# Patient Record
Sex: MALE | Race: WHITE | Employment: UNEMPLOYED | URBAN - METROPOLITAN AREA
[De-identification: names, ages, dates, MRNs, and addresses within clinical notes are randomized per-mention and may not be internally consistent; named-entity substitution may affect disease eponyms.]

---

## 2018-01-01 ENCOUNTER — APPOINTMENT (OUTPATIENT)
Dept: GENERAL RADIOLOGY | Age: 83
DRG: 698 | End: 2018-01-01
Attending: EMERGENCY MEDICINE
Payer: MEDICARE

## 2018-01-01 ENCOUNTER — APPOINTMENT (OUTPATIENT)
Dept: CT IMAGING | Age: 83
End: 2018-01-01
Attending: NURSE PRACTITIONER
Payer: MEDICARE

## 2018-01-01 ENCOUNTER — APPOINTMENT (OUTPATIENT)
Dept: GENERAL RADIOLOGY | Age: 83
End: 2018-01-01
Attending: NURSE PRACTITIONER
Payer: MEDICARE

## 2018-01-01 ENCOUNTER — HOME CARE VISIT (OUTPATIENT)
Dept: HOSPICE | Facility: HOSPICE | Age: 83
End: 2018-01-01
Payer: MEDICARE

## 2018-01-01 ENCOUNTER — HOSPITAL ENCOUNTER (INPATIENT)
Age: 83
LOS: 7 days | Discharge: NURSING, REHAB OR LONG-TERM CARE | DRG: 698 | End: 2018-10-25
Attending: EMERGENCY MEDICINE | Admitting: INTERNAL MEDICINE
Payer: MEDICARE

## 2018-01-01 ENCOUNTER — APPOINTMENT (OUTPATIENT)
Dept: GENERAL RADIOLOGY | Age: 83
DRG: 698 | End: 2018-01-01
Attending: INTERNAL MEDICINE
Payer: MEDICARE

## 2018-01-01 ENCOUNTER — HOSPITAL ENCOUNTER (INPATIENT)
Age: 83
LOS: 1 days | DRG: 951 | End: 2018-12-03
Attending: INTERNAL MEDICINE | Admitting: INTERNAL MEDICINE
Payer: OTHER MISCELLANEOUS

## 2018-01-01 ENCOUNTER — HOSPITAL ENCOUNTER (EMERGENCY)
Age: 83
Discharge: HOME | End: 2018-10-10
Attending: EMERGENCY MEDICINE
Payer: MEDICARE

## 2018-01-01 ENCOUNTER — APPOINTMENT (OUTPATIENT)
Dept: GENERAL RADIOLOGY | Age: 83
DRG: 698 | End: 2018-01-01
Attending: GENERAL ACUTE CARE HOSPITAL
Payer: MEDICARE

## 2018-01-01 ENCOUNTER — HOME CARE VISIT (OUTPATIENT)
Dept: SCHEDULING | Facility: HOME HEALTH | Age: 83
End: 2018-01-01
Payer: MEDICARE

## 2018-01-01 ENCOUNTER — APPOINTMENT (OUTPATIENT)
Dept: ULTRASOUND IMAGING | Age: 83
DRG: 698 | End: 2018-01-01
Attending: INTERNAL MEDICINE
Payer: MEDICARE

## 2018-01-01 ENCOUNTER — HOSPITAL ENCOUNTER (INPATIENT)
Age: 83
LOS: 8 days | Discharge: HOSPICE | DRG: 698 | End: 2018-12-02
Attending: EMERGENCY MEDICINE | Admitting: INTERNAL MEDICINE
Payer: MEDICARE

## 2018-01-01 ENCOUNTER — HOSPICE ADMISSION (OUTPATIENT)
Dept: HOSPICE | Facility: HOSPICE | Age: 83
End: 2018-01-01
Payer: MEDICARE

## 2018-01-01 ENCOUNTER — APPOINTMENT (OUTPATIENT)
Dept: CT IMAGING | Age: 83
DRG: 698 | End: 2018-01-01
Attending: EMERGENCY MEDICINE
Payer: MEDICARE

## 2018-01-01 ENCOUNTER — APPOINTMENT (OUTPATIENT)
Dept: GENERAL RADIOLOGY | Age: 83
DRG: 698 | End: 2018-01-01
Attending: FAMILY MEDICINE
Payer: MEDICARE

## 2018-01-01 ENCOUNTER — APPOINTMENT (OUTPATIENT)
Dept: CT IMAGING | Age: 83
DRG: 698 | End: 2018-01-01
Attending: INTERNAL MEDICINE
Payer: MEDICARE

## 2018-01-01 VITALS
HEART RATE: 88 BPM | RESPIRATION RATE: 18 BRPM | TEMPERATURE: 97.4 F | WEIGHT: 143.08 LBS | SYSTOLIC BLOOD PRESSURE: 137 MMHG | BODY MASS INDEX: 19.38 KG/M2 | HEIGHT: 72 IN | OXYGEN SATURATION: 87 % | DIASTOLIC BLOOD PRESSURE: 85 MMHG

## 2018-01-01 VITALS
TEMPERATURE: 97.3 F | RESPIRATION RATE: 18 BRPM | HEART RATE: 91 BPM | DIASTOLIC BLOOD PRESSURE: 82 MMHG | OXYGEN SATURATION: 100 % | SYSTOLIC BLOOD PRESSURE: 127 MMHG

## 2018-01-01 VITALS
DIASTOLIC BLOOD PRESSURE: 71 MMHG | WEIGHT: 143.96 LBS | SYSTOLIC BLOOD PRESSURE: 130 MMHG | HEART RATE: 73 BPM | OXYGEN SATURATION: 96 % | HEIGHT: 72 IN | RESPIRATION RATE: 16 BRPM | BODY MASS INDEX: 19.5 KG/M2 | TEMPERATURE: 97.4 F

## 2018-01-01 DIAGNOSIS — G93.41 ACUTE METABOLIC ENCEPHALOPATHY: ICD-10-CM

## 2018-01-01 DIAGNOSIS — T68.XXXA HYPOTHERMIA, INITIAL ENCOUNTER: ICD-10-CM

## 2018-01-01 DIAGNOSIS — R53.81 PHYSICAL DEBILITY: ICD-10-CM

## 2018-01-01 DIAGNOSIS — R41.0 DELIRIUM: ICD-10-CM

## 2018-01-01 DIAGNOSIS — Z71.89 COUNSELING REGARDING ADVANCED CARE PLANNING AND GOALS OF CARE: ICD-10-CM

## 2018-01-01 DIAGNOSIS — S09.90XA CLOSED HEAD INJURY, INITIAL ENCOUNTER: ICD-10-CM

## 2018-01-01 DIAGNOSIS — F03.91 DEMENTIA WITH BEHAVIORAL DISTURBANCE, UNSPECIFIED DEMENTIA TYPE: ICD-10-CM

## 2018-01-01 DIAGNOSIS — R65.20 SEVERE SEPSIS (HCC): Primary | ICD-10-CM

## 2018-01-01 DIAGNOSIS — J18.9 PNEUMONIA OF RIGHT LUNG DUE TO INFECTIOUS ORGANISM, UNSPECIFIED PART OF LUNG: ICD-10-CM

## 2018-01-01 DIAGNOSIS — S01.01XA LACERATION OF SCALP, INITIAL ENCOUNTER: ICD-10-CM

## 2018-01-01 DIAGNOSIS — T83.511A URINARY TRACT INFECTION ASSOCIATED WITH INDWELLING URETHRAL CATHETER, INITIAL ENCOUNTER (HCC): ICD-10-CM

## 2018-01-01 DIAGNOSIS — R11.2 NAUSEA AND VOMITING, INTRACTABILITY OF VOMITING NOT SPECIFIED, UNSPECIFIED VOMITING TYPE: Primary | ICD-10-CM
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
## 2018-01-01 DIAGNOSIS — S61.411A SKIN TEAR OF RIGHT HAND WITHOUT COMPLICATION, INITIAL ENCOUNTER: ICD-10-CM

## 2018-01-01 DIAGNOSIS — E87.6 ACUTE HYPOKALEMIA: ICD-10-CM

## 2018-01-01 DIAGNOSIS — N39.0 URINARY TRACT INFECTION WITHOUT HEMATURIA, SITE UNSPECIFIED: ICD-10-CM

## 2018-01-01 DIAGNOSIS — A41.9 SEVERE SEPSIS (HCC): Primary | ICD-10-CM

## 2018-01-01 DIAGNOSIS — N39.0 URINARY TRACT INFECTION ASSOCIATED WITH INDWELLING URETHRAL CATHETER, INITIAL ENCOUNTER (HCC): ICD-10-CM

## 2018-01-01 DIAGNOSIS — N17.9 AKI (ACUTE KIDNEY INJURY) (HCC): ICD-10-CM

## 2018-01-01 DIAGNOSIS — I95.9 HYPOTENSION, UNSPECIFIED HYPOTENSION TYPE: ICD-10-CM

## 2018-01-01 DIAGNOSIS — E87.0 ACUTE HYPERNATREMIA: ICD-10-CM

## 2018-01-01 DIAGNOSIS — R13.11 ORAL PHASE DYSPHAGIA: ICD-10-CM

## 2018-01-01 LAB
ALBUMIN SERPL-MCNC: 1.7 G/DL (ref 3.5–5)
ALBUMIN SERPL-MCNC: 2.6 G/DL (ref 3.5–5)
ALBUMIN/GLOB SERPL: 0.3 {RATIO} (ref 1.1–2.2)
ALBUMIN/GLOB SERPL: 0.6 {RATIO} (ref 1.1–2.2)
ALP SERPL-CCNC: 88 U/L (ref 45–117)
ALP SERPL-CCNC: 93 U/L (ref 45–117)
ALT SERPL-CCNC: 26 U/L (ref 12–78)
ALT SERPL-CCNC: 28 U/L (ref 12–78)
ANION GAP SERPL CALC-SCNC: 10 MMOL/L (ref 5–15)
ANION GAP SERPL CALC-SCNC: 10 MMOL/L (ref 5–15)
ANION GAP SERPL CALC-SCNC: 11 MMOL/L (ref 5–15)
ANION GAP SERPL CALC-SCNC: 13 MMOL/L (ref 5–15)
ANION GAP SERPL CALC-SCNC: 5 MMOL/L (ref 5–15)
ANION GAP SERPL CALC-SCNC: 6 MMOL/L (ref 5–15)
ANION GAP SERPL CALC-SCNC: 7 MMOL/L (ref 5–15)
ANION GAP SERPL CALC-SCNC: 7 MMOL/L (ref 5–15)
ANION GAP SERPL CALC-SCNC: 8 MMOL/L (ref 5–15)
ANION GAP SERPL CALC-SCNC: 8 MMOL/L (ref 5–15)
ANION GAP SERPL CALC-SCNC: 9 MMOL/L (ref 5–15)
ANION GAP SERPL CALC-SCNC: 9 MMOL/L (ref 5–15)
APPEARANCE UR: ABNORMAL
AST SERPL-CCNC: 24 U/L (ref 15–37)
AST SERPL-CCNC: 37 U/L (ref 15–37)
ATRIAL RATE: 101 BPM
BACTERIA SPEC CULT: ABNORMAL
BACTERIA SPEC CULT: NORMAL
BACTERIA URNS QL MICRO: ABNORMAL /HPF
BASOPHILS # BLD: 0 K/UL (ref 0–0.1)
BASOPHILS # BLD: 0.1 K/UL (ref 0–0.1)
BASOPHILS NFR BLD: 0 % (ref 0–1)
BASOPHILS NFR BLD: 1 % (ref 0–1)
BILIRUB SERPL-MCNC: 0.4 MG/DL (ref 0.2–1)
BILIRUB SERPL-MCNC: 0.7 MG/DL (ref 0.2–1)
BILIRUB UR QL CFM: NEGATIVE
BILIRUB UR QL CFM: NEGATIVE
BILIRUB UR QL: NEGATIVE
BUN SERPL-MCNC: 11 MG/DL (ref 6–20)
BUN SERPL-MCNC: 21 MG/DL (ref 6–20)
BUN SERPL-MCNC: 24 MG/DL (ref 6–20)
BUN SERPL-MCNC: 25 MG/DL (ref 6–20)
BUN SERPL-MCNC: 28 MG/DL (ref 6–20)
BUN SERPL-MCNC: 30 MG/DL (ref 6–20)
BUN SERPL-MCNC: 31 MG/DL (ref 6–20)
BUN SERPL-MCNC: 34 MG/DL (ref 6–20)
BUN SERPL-MCNC: 7 MG/DL (ref 6–20)
BUN SERPL-MCNC: 9 MG/DL (ref 6–20)
BUN/CREAT SERPL: 10 (ref 12–20)
BUN/CREAT SERPL: 11 (ref 12–20)
BUN/CREAT SERPL: 11 (ref 12–20)
BUN/CREAT SERPL: 16 (ref 12–20)
BUN/CREAT SERPL: 20 (ref 12–20)
BUN/CREAT SERPL: 22 (ref 12–20)
BUN/CREAT SERPL: 24 (ref 12–20)
BUN/CREAT SERPL: 24 (ref 12–20)
BUN/CREAT SERPL: 26 (ref 12–20)
BUN/CREAT SERPL: 9 (ref 12–20)
CALCIUM SERPL-MCNC: 7.3 MG/DL (ref 8.5–10.1)
CALCIUM SERPL-MCNC: 7.6 MG/DL (ref 8.5–10.1)
CALCIUM SERPL-MCNC: 7.7 MG/DL (ref 8.5–10.1)
CALCIUM SERPL-MCNC: 7.7 MG/DL (ref 8.5–10.1)
CALCIUM SERPL-MCNC: 7.8 MG/DL (ref 8.5–10.1)
CALCIUM SERPL-MCNC: 8.1 MG/DL (ref 8.5–10.1)
CALCIUM SERPL-MCNC: 8.2 MG/DL (ref 8.5–10.1)
CALCIUM SERPL-MCNC: 8.3 MG/DL (ref 8.5–10.1)
CALCIUM SERPL-MCNC: 8.4 MG/DL (ref 8.5–10.1)
CALCIUM SERPL-MCNC: 8.6 MG/DL (ref 8.5–10.1)
CALCIUM SERPL-MCNC: 8.7 MG/DL (ref 8.5–10.1)
CALCIUM SERPL-MCNC: 8.7 MG/DL (ref 8.5–10.1)
CALCULATED P AXIS, ECG09: 77 DEGREES
CALCULATED R AXIS, ECG10: 73 DEGREES
CALCULATED T AXIS, ECG11: 68 DEGREES
CC UR VC: ABNORMAL
CC UR VC: ABNORMAL
CHLORIDE SERPL-SCNC: 100 MMOL/L (ref 97–108)
CHLORIDE SERPL-SCNC: 103 MMOL/L (ref 97–108)
CHLORIDE SERPL-SCNC: 104 MMOL/L (ref 97–108)
CHLORIDE SERPL-SCNC: 105 MMOL/L (ref 97–108)
CHLORIDE SERPL-SCNC: 106 MMOL/L (ref 97–108)
CHLORIDE SERPL-SCNC: 115 MMOL/L (ref 97–108)
CHLORIDE SERPL-SCNC: 122 MMOL/L (ref 97–108)
CHLORIDE SERPL-SCNC: 122 MMOL/L (ref 97–108)
CHLORIDE SERPL-SCNC: 123 MMOL/L (ref 97–108)
CHLORIDE SERPL-SCNC: 124 MMOL/L (ref 97–108)
CHLORIDE SERPL-SCNC: 125 MMOL/L (ref 97–108)
CHLORIDE SERPL-SCNC: 126 MMOL/L (ref 97–108)
CO2 SERPL-SCNC: 17 MMOL/L (ref 21–32)
CO2 SERPL-SCNC: 18 MMOL/L (ref 21–32)
CO2 SERPL-SCNC: 18 MMOL/L (ref 21–32)
CO2 SERPL-SCNC: 19 MMOL/L (ref 21–32)
CO2 SERPL-SCNC: 19 MMOL/L (ref 21–32)
CO2 SERPL-SCNC: 22 MMOL/L (ref 21–32)
CO2 SERPL-SCNC: 22 MMOL/L (ref 21–32)
CO2 SERPL-SCNC: 23 MMOL/L (ref 21–32)
CO2 SERPL-SCNC: 24 MMOL/L (ref 21–32)
CO2 SERPL-SCNC: 24 MMOL/L (ref 21–32)
CO2 SERPL-SCNC: 29 MMOL/L (ref 21–32)
CO2 SERPL-SCNC: 29 MMOL/L (ref 21–32)
COLOR UR: ABNORMAL
CREAT SERPL-MCNC: 0.65 MG/DL (ref 0.55–1.02)
CREAT SERPL-MCNC: 0.65 MG/DL (ref 0.7–1.3)
CREAT SERPL-MCNC: 0.7 MG/DL (ref 0.55–1.02)
CREAT SERPL-MCNC: 0.7 MG/DL (ref 0.55–1.02)
CREAT SERPL-MCNC: 0.96 MG/DL (ref 0.7–1.3)
CREAT SERPL-MCNC: 1.04 MG/DL (ref 0.7–1.3)
CREAT SERPL-MCNC: 1.18 MG/DL (ref 0.7–1.3)
CREAT SERPL-MCNC: 1.21 MG/DL (ref 0.7–1.3)
CREAT SERPL-MCNC: 1.23 MG/DL (ref 0.7–1.3)
CREAT SERPL-MCNC: 1.24 MG/DL (ref 0.7–1.3)
CREAT SERPL-MCNC: 1.26 MG/DL (ref 0.7–1.3)
CREAT SERPL-MCNC: 1.42 MG/DL (ref 0.7–1.3)
CREAT UR-MCNC: 132 MG/DL
DATE LAST DOSE: ABNORMAL
DATE LAST DOSE: ABNORMAL
DIAGNOSIS, 93000: NORMAL
DIFFERENTIAL METHOD BLD: ABNORMAL
EOSINOPHIL # BLD: 0 K/UL (ref 0–0.4)
EOSINOPHIL # BLD: 0 K/UL (ref 0–0.4)
EOSINOPHIL # BLD: 0.1 K/UL (ref 0–0.4)
EOSINOPHIL NFR BLD: 0 % (ref 0–7)
EOSINOPHIL NFR BLD: 0 % (ref 0–7)
EOSINOPHIL NFR BLD: 1 % (ref 0–7)
EPITH CASTS URNS QL MICRO: ABNORMAL /LPF
ERYTHROCYTE [DISTWIDTH] IN BLOOD BY AUTOMATED COUNT: 13.3 % (ref 11.5–14.5)
ERYTHROCYTE [DISTWIDTH] IN BLOOD BY AUTOMATED COUNT: 13.5 % (ref 11.5–14.5)
ERYTHROCYTE [DISTWIDTH] IN BLOOD BY AUTOMATED COUNT: 13.6 % (ref 11.5–14.5)
ERYTHROCYTE [DISTWIDTH] IN BLOOD BY AUTOMATED COUNT: 13.7 % (ref 11.5–14.5)
ERYTHROCYTE [DISTWIDTH] IN BLOOD BY AUTOMATED COUNT: 14.2 % (ref 11.5–14.5)
ERYTHROCYTE [DISTWIDTH] IN BLOOD BY AUTOMATED COUNT: 16.6 % (ref 11.5–14.5)
ERYTHROCYTE [DISTWIDTH] IN BLOOD BY AUTOMATED COUNT: 17.5 % (ref 11.5–14.5)
ERYTHROCYTE [DISTWIDTH] IN BLOOD BY AUTOMATED COUNT: 17.6 % (ref 11.5–14.5)
ERYTHROCYTE [DISTWIDTH] IN BLOOD BY AUTOMATED COUNT: 18 % (ref 11.5–14.5)
ERYTHROCYTE [DISTWIDTH] IN BLOOD BY AUTOMATED COUNT: 18.2 % (ref 11.5–14.5)
GLOBULIN SER CALC-MCNC: 4.7 G/DL (ref 2–4)
GLOBULIN SER CALC-MCNC: 4.9 G/DL (ref 2–4)
GLUCOSE BLD STRIP.AUTO-MCNC: 124 MG/DL (ref 65–100)
GLUCOSE BLD STRIP.AUTO-MCNC: 143 MG/DL (ref 65–100)
GLUCOSE BLD STRIP.AUTO-MCNC: 71 MG/DL (ref 65–100)
GLUCOSE BLD STRIP.AUTO-MCNC: 82 MG/DL (ref 65–100)
GLUCOSE BLD STRIP.AUTO-MCNC: 91 MG/DL (ref 65–100)
GLUCOSE BLD STRIP.AUTO-MCNC: 93 MG/DL (ref 65–100)
GLUCOSE BLD STRIP.AUTO-MCNC: 96 MG/DL (ref 65–100)
GLUCOSE SERPL-MCNC: 110 MG/DL (ref 65–100)
GLUCOSE SERPL-MCNC: 136 MG/DL (ref 65–100)
GLUCOSE SERPL-MCNC: 63 MG/DL (ref 65–100)
GLUCOSE SERPL-MCNC: 64 MG/DL (ref 65–100)
GLUCOSE SERPL-MCNC: 65 MG/DL (ref 65–100)
GLUCOSE SERPL-MCNC: 70 MG/DL (ref 65–100)
GLUCOSE SERPL-MCNC: 72 MG/DL (ref 65–100)
GLUCOSE SERPL-MCNC: 85 MG/DL (ref 65–100)
GLUCOSE SERPL-MCNC: 93 MG/DL (ref 65–100)
GLUCOSE SERPL-MCNC: 97 MG/DL (ref 65–100)
GLUCOSE SERPL-MCNC: 98 MG/DL (ref 65–100)
GLUCOSE SERPL-MCNC: 98 MG/DL (ref 65–100)
GLUCOSE UR STRIP.AUTO-MCNC: NEGATIVE MG/DL
HCT VFR BLD AUTO: 26.3 % (ref 36.6–50.3)
HCT VFR BLD AUTO: 27.2 % (ref 36.6–50.3)
HCT VFR BLD AUTO: 30.2 % (ref 36.6–50.3)
HCT VFR BLD AUTO: 31.6 % (ref 36.6–50.3)
HCT VFR BLD AUTO: 32.4 % (ref 36.6–50.3)
HCT VFR BLD AUTO: 33.5 % (ref 36.6–50.3)
HCT VFR BLD AUTO: 34.1 % (ref 35–47)
HCT VFR BLD AUTO: 35.5 % (ref 35–47)
HCT VFR BLD AUTO: 36.1 % (ref 36.6–50.3)
HCT VFR BLD AUTO: 36.6 % (ref 35–47)
HGB BLD-MCNC: 10 G/DL (ref 12.1–17)
HGB BLD-MCNC: 10.4 G/DL (ref 12.1–17)
HGB BLD-MCNC: 11.4 G/DL (ref 12.1–17)
HGB BLD-MCNC: 11.6 G/DL (ref 11.5–16)
HGB BLD-MCNC: 11.6 G/DL (ref 12.1–17)
HGB BLD-MCNC: 12.4 G/DL (ref 11.5–16)
HGB BLD-MCNC: 12.4 G/DL (ref 12.1–17)
HGB BLD-MCNC: 12.8 G/DL (ref 11.5–16)
HGB BLD-MCNC: 8.9 G/DL (ref 12.1–17)
HGB BLD-MCNC: 9.3 G/DL (ref 12.1–17)
HGB UR QL STRIP: ABNORMAL
HYALINE CASTS URNS QL MICRO: ABNORMAL /LPF (ref 0–5)
HYALINE CASTS URNS QL MICRO: ABNORMAL /LPF (ref 0–5)
IMM GRANULOCYTES # BLD: 0 K/UL (ref 0–0.04)
IMM GRANULOCYTES # BLD: 0 K/UL (ref 0–0.04)
IMM GRANULOCYTES # BLD: 0.1 K/UL (ref 0–0.04)
IMM GRANULOCYTES NFR BLD AUTO: 0 % (ref 0–0.5)
IMM GRANULOCYTES NFR BLD AUTO: 0 % (ref 0–0.5)
IMM GRANULOCYTES NFR BLD AUTO: 1 % (ref 0–0.5)
KETONES UR QL STRIP.AUTO: ABNORMAL MG/DL
KETONES UR QL STRIP.AUTO: NEGATIVE MG/DL
KETONES UR QL STRIP.AUTO: NEGATIVE MG/DL
LACTATE SERPL-SCNC: 1.2 MMOL/L (ref 0.4–2)
LACTATE SERPL-SCNC: 1.3 MMOL/L (ref 0.4–2)
LEUKOCYTE ESTERASE UR QL STRIP.AUTO: ABNORMAL
LYMPHOCYTES # BLD: 0.1 K/UL (ref 0.8–3.5)
LYMPHOCYTES # BLD: 0.4 K/UL (ref 0.8–3.5)
LYMPHOCYTES # BLD: 0.5 K/UL (ref 0.8–3.5)
LYMPHOCYTES # BLD: 0.7 K/UL (ref 0.8–3.5)
LYMPHOCYTES # BLD: 0.7 K/UL (ref 0.8–3.5)
LYMPHOCYTES # BLD: 0.8 K/UL (ref 0.8–3.5)
LYMPHOCYTES NFR BLD: 1 % (ref 12–49)
LYMPHOCYTES NFR BLD: 4 % (ref 12–49)
LYMPHOCYTES NFR BLD: 5 % (ref 12–49)
LYMPHOCYTES NFR BLD: 6 % (ref 12–49)
LYMPHOCYTES NFR BLD: 8 % (ref 12–49)
LYMPHOCYTES NFR BLD: 9 % (ref 12–49)
MAGNESIUM SERPL-MCNC: 1.6 MG/DL (ref 1.6–2.4)
MAGNESIUM SERPL-MCNC: 1.6 MG/DL (ref 1.6–2.4)
MAGNESIUM SERPL-MCNC: 1.7 MG/DL (ref 1.6–2.4)
MAGNESIUM SERPL-MCNC: 1.7 MG/DL (ref 1.6–2.4)
MAGNESIUM SERPL-MCNC: 1.9 MG/DL (ref 1.6–2.4)
MAGNESIUM SERPL-MCNC: 2 MG/DL (ref 1.6–2.4)
MCH RBC QN AUTO: 30.8 PG (ref 26–34)
MCH RBC QN AUTO: 31 PG (ref 26–34)
MCH RBC QN AUTO: 31.1 PG (ref 26–34)
MCH RBC QN AUTO: 31.1 PG (ref 26–34)
MCH RBC QN AUTO: 31.2 PG (ref 26–34)
MCH RBC QN AUTO: 31.4 PG (ref 26–34)
MCH RBC QN AUTO: 31.4 PG (ref 26–34)
MCH RBC QN AUTO: 31.7 PG (ref 26–34)
MCH RBC QN AUTO: 31.7 PG (ref 26–34)
MCH RBC QN AUTO: 31.8 PG (ref 26–34)
MCHC RBC AUTO-ENTMCNC: 32.9 G/DL (ref 30–36.5)
MCHC RBC AUTO-ENTMCNC: 33.1 G/DL (ref 30–36.5)
MCHC RBC AUTO-ENTMCNC: 33.8 G/DL (ref 30–36.5)
MCHC RBC AUTO-ENTMCNC: 34 G/DL (ref 30–36.5)
MCHC RBC AUTO-ENTMCNC: 34.2 G/DL (ref 30–36.5)
MCHC RBC AUTO-ENTMCNC: 34.3 G/DL (ref 30–36.5)
MCHC RBC AUTO-ENTMCNC: 34.6 G/DL (ref 30–36.5)
MCHC RBC AUTO-ENTMCNC: 34.9 G/DL (ref 30–36.5)
MCHC RBC AUTO-ENTMCNC: 35 G/DL (ref 30–36.5)
MCHC RBC AUTO-ENTMCNC: 35.2 G/DL (ref 30–36.5)
MCV RBC AUTO: 89.8 FL (ref 80–99)
MCV RBC AUTO: 89.9 FL (ref 80–99)
MCV RBC AUTO: 90 FL (ref 80–99)
MCV RBC AUTO: 90.3 FL (ref 80–99)
MCV RBC AUTO: 91 FL (ref 80–99)
MCV RBC AUTO: 91.3 FL (ref 80–99)
MCV RBC AUTO: 92 FL (ref 80–99)
MCV RBC AUTO: 92.2 FL (ref 80–99)
MCV RBC AUTO: 93.5 FL (ref 80–99)
MCV RBC AUTO: 95.9 FL (ref 80–99)
MONOCYTES # BLD: 0.1 K/UL (ref 0–1)
MONOCYTES # BLD: 0.2 K/UL (ref 0–1)
MONOCYTES # BLD: 0.5 K/UL (ref 0–1)
MONOCYTES # BLD: 0.6 K/UL (ref 0–1)
MONOCYTES # BLD: 0.7 K/UL (ref 0–1)
MONOCYTES # BLD: 0.8 K/UL (ref 0–1)
MONOCYTES NFR BLD: 1 % (ref 5–13)
MONOCYTES NFR BLD: 2 % (ref 5–13)
MONOCYTES NFR BLD: 5 % (ref 5–13)
MONOCYTES NFR BLD: 7 % (ref 5–13)
MONOCYTES NFR BLD: 8 % (ref 5–13)
MONOCYTES NFR BLD: 8 % (ref 5–13)
NEUTS BAND NFR BLD MANUAL: 8 %
NEUTS SEG # BLD: 7.2 K/UL (ref 1.8–8)
NEUTS SEG # BLD: 7.6 K/UL (ref 1.8–8)
NEUTS SEG # BLD: 8.7 K/UL (ref 1.8–8)
NEUTS SEG # BLD: 8.8 K/UL (ref 1.8–8)
NEUTS SEG # BLD: 9 K/UL (ref 1.8–8)
NEUTS SEG # BLD: 9.2 K/UL (ref 1.8–8)
NEUTS SEG NFR BLD: 82 % (ref 32–75)
NEUTS SEG NFR BLD: 82 % (ref 32–75)
NEUTS SEG NFR BLD: 84 % (ref 32–75)
NEUTS SEG NFR BLD: 89 % (ref 32–75)
NEUTS SEG NFR BLD: 90 % (ref 32–75)
NEUTS SEG NFR BLD: 94 % (ref 32–75)
NITRITE UR QL STRIP.AUTO: NEGATIVE
NITRITE UR QL STRIP.AUTO: POSITIVE
NITRITE UR QL STRIP.AUTO: POSITIVE
NRBC # BLD: 0 K/UL (ref 0–0.01)
NRBC # BLD: 0.02 K/UL (ref 0–0.01)
NRBC # BLD: 0.03 K/UL (ref 0–0.01)
NRBC BLD-RTO: 0 PER 100 WBC
NRBC BLD-RTO: 0.2 PER 100 WBC
NRBC BLD-RTO: 0.2 PER 100 WBC
P-R INTERVAL, ECG05: 128 MS
PH UR STRIP: 6 [PH] (ref 5–8)
PH UR STRIP: 6 [PH] (ref 5–8)
PH UR STRIP: 7.5 [PH] (ref 5–8)
PHOSPHATE SERPL-MCNC: 1.9 MG/DL (ref 2.6–4.7)
PHOSPHATE SERPL-MCNC: 2 MG/DL (ref 2.6–4.7)
PHOSPHATE SERPL-MCNC: 2.1 MG/DL (ref 2.6–4.7)
PHOSPHATE SERPL-MCNC: 3.2 MG/DL (ref 2.6–4.7)
PLATELET # BLD AUTO: 116 K/UL (ref 150–400)
PLATELET # BLD AUTO: 121 K/UL (ref 150–400)
PLATELET # BLD AUTO: 226 K/UL (ref 150–400)
PLATELET # BLD AUTO: 227 K/UL (ref 150–400)
PLATELET # BLD AUTO: 231 K/UL (ref 150–400)
PLATELET # BLD AUTO: 257 K/UL (ref 150–400)
PLATELET # BLD AUTO: 282 K/UL (ref 150–400)
PLATELET # BLD AUTO: 64 K/UL (ref 150–400)
PLATELET # BLD AUTO: 81 K/UL (ref 150–400)
PLATELET # BLD AUTO: 98 K/UL (ref 150–400)
PMV BLD AUTO: 10.2 FL (ref 8.9–12.9)
PMV BLD AUTO: 10.2 FL (ref 8.9–12.9)
PMV BLD AUTO: 10.5 FL (ref 8.9–12.9)
PMV BLD AUTO: 10.8 FL (ref 8.9–12.9)
PMV BLD AUTO: 10.9 FL (ref 8.9–12.9)
PMV BLD AUTO: 11.2 FL (ref 8.9–12.9)
PMV BLD AUTO: 11.3 FL (ref 8.9–12.9)
PMV BLD AUTO: 12 FL (ref 8.9–12.9)
PMV BLD AUTO: 9.9 FL (ref 8.9–12.9)
PMV BLD AUTO: 9.9 FL (ref 8.9–12.9)
POTASSIUM SERPL-SCNC: 2.6 MMOL/L (ref 3.5–5.1)
POTASSIUM SERPL-SCNC: 3 MMOL/L (ref 3.5–5.1)
POTASSIUM SERPL-SCNC: 3.1 MMOL/L (ref 3.5–5.1)
POTASSIUM SERPL-SCNC: 3.1 MMOL/L (ref 3.5–5.1)
POTASSIUM SERPL-SCNC: 3.2 MMOL/L (ref 3.5–5.1)
POTASSIUM SERPL-SCNC: 3.4 MMOL/L (ref 3.5–5.1)
POTASSIUM SERPL-SCNC: 3.5 MMOL/L (ref 3.5–5.1)
POTASSIUM SERPL-SCNC: 3.6 MMOL/L (ref 3.5–5.1)
POTASSIUM SERPL-SCNC: 3.7 MMOL/L (ref 3.5–5.1)
POTASSIUM SERPL-SCNC: 3.8 MMOL/L (ref 3.5–5.1)
POTASSIUM UR-SCNC: 41 MMOL/L
PROT SERPL-MCNC: 6.6 G/DL (ref 6.4–8.2)
PROT SERPL-MCNC: 7.3 G/DL (ref 6.4–8.2)
PROT UR STRIP-MCNC: 100 MG/DL
PROT UR STRIP-MCNC: 100 MG/DL
PROT UR STRIP-MCNC: 30 MG/DL
Q-T INTERVAL, ECG07: 342 MS
QRS DURATION, ECG06: 76 MS
QTC CALCULATION (BEZET), ECG08: 443 MS
RBC # BLD AUTO: 2.86 M/UL (ref 4.1–5.7)
RBC # BLD AUTO: 2.98 M/UL (ref 4.1–5.7)
RBC # BLD AUTO: 3.15 M/UL (ref 4.1–5.7)
RBC # BLD AUTO: 3.38 M/UL (ref 4.1–5.7)
RBC # BLD AUTO: 3.6 M/UL (ref 4.1–5.7)
RBC # BLD AUTO: 3.7 M/UL (ref 3.8–5.2)
RBC # BLD AUTO: 3.73 M/UL (ref 4.1–5.7)
RBC # BLD AUTO: 3.9 M/UL (ref 3.8–5.2)
RBC # BLD AUTO: 4 M/UL (ref 4.1–5.7)
RBC # BLD AUTO: 4.07 M/UL (ref 3.8–5.2)
RBC #/AREA URNS HPF: ABNORMAL /HPF (ref 0–5)
RBC MORPH BLD: ABNORMAL
REPORTED DOSE,DOSE: ABNORMAL UNITS
REPORTED DOSE,DOSE: ABNORMAL UNITS
REPORTED DOSE/TIME,TMG: ABNORMAL
REPORTED DOSE/TIME,TMG: ABNORMAL
SERVICE CMNT-IMP: ABNORMAL
SERVICE CMNT-IMP: NORMAL
SODIUM SERPL-SCNC: 133 MMOL/L (ref 136–145)
SODIUM SERPL-SCNC: 137 MMOL/L (ref 136–145)
SODIUM SERPL-SCNC: 141 MMOL/L (ref 136–145)
SODIUM SERPL-SCNC: 147 MMOL/L (ref 136–145)
SODIUM SERPL-SCNC: 149 MMOL/L (ref 136–145)
SODIUM SERPL-SCNC: 150 MMOL/L (ref 136–145)
SODIUM SERPL-SCNC: 152 MMOL/L (ref 136–145)
SODIUM SERPL-SCNC: 152 MMOL/L (ref 136–145)
SODIUM SERPL-SCNC: 153 MMOL/L (ref 136–145)
SODIUM SERPL-SCNC: 154 MMOL/L (ref 136–145)
SODIUM UR-SCNC: 21 MMOL/L
SP GR UR REFRACTOMETRY: 1.02 (ref 1–1.03)
TSH SERPL DL<=0.05 MIU/L-ACNC: 8.24 UIU/ML (ref 0.36–3.74)
UA: UC IF INDICATED,UAUC: ABNORMAL
UROBILINOGEN UR QL STRIP.AUTO: 1 EU/DL (ref 0.2–1)
VANCOMYCIN TROUGH SERPL-MCNC: 17.5 UG/ML (ref 5–10)
VANCOMYCIN TROUGH SERPL-MCNC: 25.4 UG/ML (ref 5–10)
VENTRICULAR RATE, ECG03: 101 BPM
WBC # BLD AUTO: 10.3 K/UL (ref 3.6–11)
WBC # BLD AUTO: 10.5 K/UL (ref 4.1–11.1)
WBC # BLD AUTO: 13.1 K/UL (ref 4.1–11.1)
WBC # BLD AUTO: 13.3 K/UL (ref 4.1–11.1)
WBC # BLD AUTO: 7.2 K/UL (ref 4.1–11.1)
WBC # BLD AUTO: 8.8 K/UL (ref 3.6–11)
WBC # BLD AUTO: 9 K/UL (ref 4.1–11.1)
WBC # BLD AUTO: 9.3 K/UL (ref 3.6–11)
WBC # BLD AUTO: 9.6 K/UL (ref 4.1–11.1)
WBC # BLD AUTO: 9.6 K/UL (ref 4.1–11.1)
WBC MORPH BLD: ABNORMAL
WBC URNS QL MICRO: >100 /HPF (ref 0–4)
WBC URNS QL MICRO: >100 /HPF (ref 0–4)
WBC URNS QL MICRO: ABNORMAL /HPF (ref 0–4)
YEAST BUDDING URNS QL: PRESENT

## 2018-01-01 PROCEDURE — 74011250637 HC RX REV CODE- 250/637: Performed by: INTERNAL MEDICINE

## 2018-01-01 PROCEDURE — 74011250636 HC RX REV CODE- 250/636: Performed by: FAMILY MEDICINE

## 2018-01-01 PROCEDURE — 94760 N-INVAS EAR/PLS OXIMETRY 1: CPT

## 2018-01-01 PROCEDURE — 65610000006 HC RM INTENSIVE CARE

## 2018-01-01 PROCEDURE — 80053 COMPREHEN METABOLIC PANEL: CPT

## 2018-01-01 PROCEDURE — 02HV33Z INSERTION OF INFUSION DEVICE INTO SUPERIOR VENA CAVA, PERCUTANEOUS APPROACH: ICD-10-PCS | Performed by: EMERGENCY MEDICINE

## 2018-01-01 PROCEDURE — 74011250636 HC RX REV CODE- 250/636: Performed by: INTERNAL MEDICINE

## 2018-01-01 PROCEDURE — 74011250636 HC RX REV CODE- 250/636: Performed by: NURSE PRACTITIONER

## 2018-01-01 PROCEDURE — 74011000258 HC RX REV CODE- 258: Performed by: INTERNAL MEDICINE

## 2018-01-01 PROCEDURE — 74177 CT ABD & PELVIS W/CONTRAST: CPT

## 2018-01-01 PROCEDURE — 82570 ASSAY OF URINE CREATININE: CPT

## 2018-01-01 PROCEDURE — 77030013079 HC BLNKT BAIR HGGR 3M -A

## 2018-01-01 PROCEDURE — 65660000000 HC RM CCU STEPDOWN

## 2018-01-01 PROCEDURE — 83735 ASSAY OF MAGNESIUM: CPT

## 2018-01-01 PROCEDURE — 76770 US EXAM ABDO BACK WALL COMP: CPT

## 2018-01-01 PROCEDURE — 36415 COLL VENOUS BLD VENIPUNCTURE: CPT | Performed by: GENERAL ACUTE CARE HOSPITAL

## 2018-01-01 PROCEDURE — 36415 COLL VENOUS BLD VENIPUNCTURE: CPT

## 2018-01-01 PROCEDURE — 81001 URINALYSIS AUTO W/SCOPE: CPT

## 2018-01-01 PROCEDURE — 80048 BASIC METABOLIC PNL TOTAL CA: CPT

## 2018-01-01 PROCEDURE — 84100 ASSAY OF PHOSPHORUS: CPT | Performed by: GENERAL ACUTE CARE HOSPITAL

## 2018-01-01 PROCEDURE — 92526 ORAL FUNCTION THERAPY: CPT

## 2018-01-01 PROCEDURE — 96365 THER/PROPH/DIAG IV INF INIT: CPT

## 2018-01-01 PROCEDURE — 74011000258 HC RX REV CODE- 258: Performed by: EMERGENCY MEDICINE

## 2018-01-01 PROCEDURE — 71045 X-RAY EXAM CHEST 1 VIEW: CPT

## 2018-01-01 PROCEDURE — 77010033678 HC OXYGEN DAILY

## 2018-01-01 PROCEDURE — 74011000258 HC RX REV CODE- 258: Performed by: GENERAL ACUTE CARE HOSPITAL

## 2018-01-01 PROCEDURE — 87040 BLOOD CULTURE FOR BACTERIA: CPT

## 2018-01-01 PROCEDURE — 85025 COMPLETE CBC W/AUTO DIFF WBC: CPT | Performed by: EMERGENCY MEDICINE

## 2018-01-01 PROCEDURE — 74011250636 HC RX REV CODE- 250/636: Performed by: GENERAL ACUTE CARE HOSPITAL

## 2018-01-01 PROCEDURE — 70450 CT HEAD/BRAIN W/O DYE: CPT

## 2018-01-01 PROCEDURE — 3336500001 HSPC ELECTION

## 2018-01-01 PROCEDURE — 99285 EMERGENCY DEPT VISIT HI MDM: CPT

## 2018-01-01 PROCEDURE — 87186 SC STD MICRODIL/AGAR DIL: CPT

## 2018-01-01 PROCEDURE — 92611 MOTION FLUOROSCOPY/SWALLOW: CPT

## 2018-01-01 PROCEDURE — 85025 COMPLETE CBC W/AUTO DIFF WBC: CPT

## 2018-01-01 PROCEDURE — 81001 URINALYSIS AUTO W/SCOPE: CPT | Performed by: EMERGENCY MEDICINE

## 2018-01-01 PROCEDURE — 87186 SC STD MICRODIL/AGAR DIL: CPT | Performed by: EMERGENCY MEDICINE

## 2018-01-01 PROCEDURE — 74011250636 HC RX REV CODE- 250/636: Performed by: EMERGENCY MEDICINE

## 2018-01-01 PROCEDURE — 74011000250 HC RX REV CODE- 250: Performed by: INTERNAL MEDICINE

## 2018-01-01 PROCEDURE — 87077 CULTURE AEROBIC IDENTIFY: CPT | Performed by: EMERGENCY MEDICINE

## 2018-01-01 PROCEDURE — 74011000250 HC RX REV CODE- 250: Performed by: GENERAL ACUTE CARE HOSPITAL

## 2018-01-01 PROCEDURE — 82962 GLUCOSE BLOOD TEST: CPT

## 2018-01-01 PROCEDURE — 74011250637 HC RX REV CODE- 250/637: Performed by: FAMILY MEDICINE

## 2018-01-01 PROCEDURE — 80202 ASSAY OF VANCOMYCIN: CPT | Performed by: GENERAL ACUTE CARE HOSPITAL

## 2018-01-01 PROCEDURE — 80048 BASIC METABOLIC PNL TOTAL CA: CPT | Performed by: GENERAL ACUTE CARE HOSPITAL

## 2018-01-01 PROCEDURE — 77030034696 HC CATH URETH FOL 2W BARD -A

## 2018-01-01 PROCEDURE — 74011250637 HC RX REV CODE- 250/637: Performed by: GENERAL ACUTE CARE HOSPITAL

## 2018-01-01 PROCEDURE — 74011250637 HC RX REV CODE- 250/637: Performed by: NURSE PRACTITIONER

## 2018-01-01 PROCEDURE — 80202 ASSAY OF VANCOMYCIN: CPT

## 2018-01-01 PROCEDURE — 36415 COLL VENOUS BLD VENIPUNCTURE: CPT | Performed by: INTERNAL MEDICINE

## 2018-01-01 PROCEDURE — 74011250637 HC RX REV CODE- 250/637

## 2018-01-01 PROCEDURE — 77030005518 HC CATH URETH FOL 2W BARD -B

## 2018-01-01 PROCEDURE — 51702 INSERT TEMP BLADDER CATH: CPT

## 2018-01-01 PROCEDURE — 84133 ASSAY OF URINE POTASSIUM: CPT

## 2018-01-01 PROCEDURE — 83605 ASSAY OF LACTIC ACID: CPT | Performed by: EMERGENCY MEDICINE

## 2018-01-01 PROCEDURE — 84300 ASSAY OF URINE SODIUM: CPT

## 2018-01-01 PROCEDURE — 85027 COMPLETE CBC AUTOMATED: CPT

## 2018-01-01 PROCEDURE — 73130 X-RAY EXAM OF HAND: CPT

## 2018-01-01 PROCEDURE — 92610 EVALUATE SWALLOWING FUNCTION: CPT

## 2018-01-01 PROCEDURE — 0656 HSPC GENERAL INPATIENT

## 2018-01-01 PROCEDURE — 84100 ASSAY OF PHOSPHORUS: CPT

## 2018-01-01 PROCEDURE — 73521 X-RAY EXAM HIPS BI 2 VIEWS: CPT

## 2018-01-01 PROCEDURE — 65270000015 HC RM PRIVATE ONCOLOGY

## 2018-01-01 PROCEDURE — C1751 CATH, INF, PER/CENT/MIDLINE: HCPCS

## 2018-01-01 PROCEDURE — G0299 HHS/HOSPICE OF RN EA 15 MIN: HCPCS

## 2018-01-01 PROCEDURE — 75810000293 HC SIMP/SUPERF WND  RPR

## 2018-01-01 PROCEDURE — 80053 COMPREHEN METABOLIC PANEL: CPT | Performed by: EMERGENCY MEDICINE

## 2018-01-01 PROCEDURE — 65270000029 HC RM PRIVATE

## 2018-01-01 PROCEDURE — 87077 CULTURE AEROBIC IDENTIFY: CPT

## 2018-01-01 PROCEDURE — 93005 ELECTROCARDIOGRAM TRACING: CPT

## 2018-01-01 PROCEDURE — 77030031132 HC SUT NYL COVD -A

## 2018-01-01 PROCEDURE — 96375 TX/PRO/DX INJ NEW DRUG ADDON: CPT

## 2018-01-01 PROCEDURE — 87086 URINE CULTURE/COLONY COUNT: CPT | Performed by: EMERGENCY MEDICINE

## 2018-01-01 PROCEDURE — 76450000000

## 2018-01-01 PROCEDURE — 74230 X-RAY XM SWLNG FUNCJ C+: CPT

## 2018-01-01 PROCEDURE — 74011636320 HC RX REV CODE- 636/320

## 2018-01-01 PROCEDURE — 96368 THER/DIAG CONCURRENT INF: CPT

## 2018-01-01 PROCEDURE — 85025 COMPLETE CBC W/AUTO DIFF WBC: CPT | Performed by: GENERAL ACUTE CARE HOSPITAL

## 2018-01-01 PROCEDURE — 36415 COLL VENOUS BLD VENIPUNCTURE: CPT | Performed by: EMERGENCY MEDICINE

## 2018-01-01 PROCEDURE — 87086 URINE CULTURE/COLONY COUNT: CPT

## 2018-01-01 PROCEDURE — 76775 US EXAM ABDO BACK WALL LIM: CPT

## 2018-01-01 PROCEDURE — 77030005514 HC CATH URETH FOL14 BARD -A

## 2018-01-01 PROCEDURE — 84443 ASSAY THYROID STIM HORMONE: CPT | Performed by: INTERNAL MEDICINE

## 2018-01-01 PROCEDURE — 83735 ASSAY OF MAGNESIUM: CPT | Performed by: GENERAL ACUTE CARE HOSPITAL

## 2018-01-01 PROCEDURE — 77030018836 HC SOL IRR NACL ICUM -A

## 2018-01-01 PROCEDURE — 72125 CT NECK SPINE W/O DYE: CPT

## 2018-01-01 PROCEDURE — 96366 THER/PROPH/DIAG IV INF ADDON: CPT

## 2018-01-01 PROCEDURE — 85027 COMPLETE CBC AUTOMATED: CPT | Performed by: INTERNAL MEDICINE

## 2018-01-01 PROCEDURE — 80048 BASIC METABOLIC PNL TOTAL CA: CPT | Performed by: INTERNAL MEDICINE

## 2018-01-01 PROCEDURE — 87040 BLOOD CULTURE FOR BACTERIA: CPT | Performed by: EMERGENCY MEDICINE

## 2018-01-01 PROCEDURE — 75810000137 HC PLCMT CENT VENOUS CATH

## 2018-01-01 PROCEDURE — 96361 HYDRATE IV INFUSION ADD-ON: CPT

## 2018-01-01 PROCEDURE — 83605 ASSAY OF LACTIC ACID: CPT

## 2018-01-01 RX ORDER — HEPARIN SODIUM 5000 [USP'U]/ML
5000 INJECTION, SOLUTION INTRAVENOUS; SUBCUTANEOUS EVERY 8 HOURS
Status: DISCONTINUED | OUTPATIENT
Start: 2018-01-01 | End: 2018-01-01

## 2018-01-01 RX ORDER — VITAMIN E 1000 UNIT
1000 CAPSULE ORAL DAILY
COMMUNITY
End: 2018-01-01

## 2018-01-01 RX ORDER — VANCOMYCIN HYDROCHLORIDE
1250 EVERY 12 HOURS
Status: DISCONTINUED | OUTPATIENT
Start: 2018-01-01 | End: 2018-01-01

## 2018-01-01 RX ORDER — VANCOMYCIN 2 GRAM/500 ML IN 0.9 % SODIUM CHLORIDE INTRAVENOUS
2000 ONCE
Status: COMPLETED | OUTPATIENT
Start: 2018-01-01 | End: 2018-01-01

## 2018-01-01 RX ORDER — POTASSIUM CHLORIDE 20 MEQ/1
TABLET, EXTENDED RELEASE ORAL
Status: DISCONTINUED
Start: 2018-01-01 | End: 2018-01-01

## 2018-01-01 RX ORDER — DEXTROSE MONOHYDRATE 50 MG/ML
100 INJECTION, SOLUTION INTRAVENOUS CONTINUOUS
Status: DISCONTINUED | OUTPATIENT
Start: 2018-01-01 | End: 2018-01-01

## 2018-01-01 RX ORDER — MORPHINE SULFATE 2 MG/ML
2 INJECTION, SOLUTION INTRAMUSCULAR; INTRAVENOUS
Status: DISCONTINUED | OUTPATIENT
Start: 2018-01-01 | End: 2018-01-01 | Stop reason: HOSPADM

## 2018-01-01 RX ORDER — SODIUM CHLORIDE 0.9 % (FLUSH) 0.9 %
10 SYRINGE (ML) INJECTION
Status: COMPLETED | OUTPATIENT
Start: 2018-01-01 | End: 2018-01-01

## 2018-01-01 RX ORDER — DEXTROSE MONOHYDRATE AND SODIUM CHLORIDE 5; .45 G/100ML; G/100ML
75 INJECTION, SOLUTION INTRAVENOUS CONTINUOUS
Status: DISCONTINUED | OUTPATIENT
Start: 2018-01-01 | End: 2018-01-01

## 2018-01-01 RX ORDER — FACIAL-BODY WIPES
10 EACH TOPICAL DAILY PRN
Status: DISCONTINUED | OUTPATIENT
Start: 2018-01-01 | End: 2018-01-01 | Stop reason: HOSPADM

## 2018-01-01 RX ORDER — SODIUM CHLORIDE 0.9 % (FLUSH) 0.9 %
5-10 SYRINGE (ML) INJECTION EVERY 8 HOURS
Status: DISCONTINUED | OUTPATIENT
Start: 2018-01-01 | End: 2018-01-01 | Stop reason: HOSPADM

## 2018-01-01 RX ORDER — POTASSIUM CHLORIDE 7.45 MG/ML
10 INJECTION INTRAVENOUS
Status: COMPLETED | OUTPATIENT
Start: 2018-01-01 | End: 2018-01-01

## 2018-01-01 RX ORDER — SERTRALINE HYDROCHLORIDE 50 MG/1
50 TABLET, FILM COATED ORAL DAILY
Status: DISCONTINUED | OUTPATIENT
Start: 2018-01-01 | End: 2018-01-01 | Stop reason: HOSPADM

## 2018-01-01 RX ORDER — LEVOTHYROXINE SODIUM 112 UG/1
112 TABLET ORAL
Status: DISCONTINUED | OUTPATIENT
Start: 2018-01-01 | End: 2018-01-01 | Stop reason: HOSPADM

## 2018-01-01 RX ORDER — DEXTROSE, SODIUM CHLORIDE, AND POTASSIUM CHLORIDE 5; .45; .15 G/100ML; G/100ML; G/100ML
75 INJECTION INTRAVENOUS CONTINUOUS
Status: DISCONTINUED | OUTPATIENT
Start: 2018-01-01 | End: 2018-01-01

## 2018-01-01 RX ORDER — CEFDINIR 300 MG/1
300 CAPSULE ORAL 2 TIMES DAILY
Qty: 6 CAP | Refills: 0 | Status: SHIPPED
Start: 2018-01-01 | End: 2018-01-01 | Stop reason: SDUPTHER

## 2018-01-01 RX ORDER — SODIUM CHLORIDE 450 MG/100ML
125 INJECTION, SOLUTION INTRAVENOUS CONTINUOUS
Status: DISCONTINUED | OUTPATIENT
Start: 2018-01-01 | End: 2018-01-01

## 2018-01-01 RX ORDER — PHENYLEPHRINE HYDROCHLORIDE 10 MG/ML
200 INJECTION INTRAVENOUS
Status: DISCONTINUED | OUTPATIENT
Start: 2018-01-01 | End: 2018-01-01

## 2018-01-01 RX ORDER — SODIUM CHLORIDE 0.9 % (FLUSH) 0.9 %
5-10 SYRINGE (ML) INJECTION AS NEEDED
Status: DISCONTINUED | OUTPATIENT
Start: 2018-01-01 | End: 2018-01-01 | Stop reason: HOSPADM

## 2018-01-01 RX ORDER — VANCOMYCIN/0.9 % SOD CHLORIDE 1.5G/250ML
1500 PLASTIC BAG, INJECTION (ML) INTRAVENOUS
Status: DISCONTINUED | OUTPATIENT
Start: 2018-01-01 | End: 2018-01-01

## 2018-01-01 RX ORDER — MORPHINE SULFATE 2 MG/ML
2 INJECTION, SOLUTION INTRAMUSCULAR; INTRAVENOUS EVERY 4 HOURS
Status: DISCONTINUED | OUTPATIENT
Start: 2018-01-01 | End: 2018-01-01 | Stop reason: HOSPADM

## 2018-01-01 RX ORDER — ACETAMINOPHEN 325 MG/1
650 TABLET ORAL
Status: DISCONTINUED | OUTPATIENT
Start: 2018-01-01 | End: 2018-01-01 | Stop reason: HOSPADM

## 2018-01-01 RX ORDER — MORPHINE SULFATE 100 MG/5ML
10 SOLUTION ORAL
Status: DISCONTINUED | OUTPATIENT
Start: 2018-01-01 | End: 2018-01-01

## 2018-01-01 RX ORDER — ATORVASTATIN CALCIUM 20 MG/1
20 TABLET, FILM COATED ORAL DAILY
COMMUNITY
End: 2018-01-01

## 2018-01-01 RX ORDER — SCOLOPAMINE TRANSDERMAL SYSTEM 1 MG/1
1 PATCH, EXTENDED RELEASE TRANSDERMAL
Status: DISCONTINUED | OUTPATIENT
Start: 2018-01-01 | End: 2018-01-01 | Stop reason: HOSPADM

## 2018-01-01 RX ORDER — LIDOCAINE HYDROCHLORIDE 10 MG/ML
10 INJECTION, SOLUTION EPIDURAL; INFILTRATION; INTRACAUDAL; PERINEURAL ONCE
Status: COMPLETED | OUTPATIENT
Start: 2018-01-01 | End: 2018-01-01

## 2018-01-01 RX ORDER — POTASSIUM CHLORIDE 20 MEQ/1
40 TABLET, EXTENDED RELEASE ORAL
Status: COMPLETED | OUTPATIENT
Start: 2018-01-01 | End: 2018-01-01

## 2018-01-01 RX ORDER — VANCOMYCIN/0.9 % SOD CHLORIDE 1.5G/250ML
1500 PLASTIC BAG, INJECTION (ML) INTRAVENOUS EVERY 12 HOURS
Status: DISCONTINUED | OUTPATIENT
Start: 2018-01-01 | End: 2018-01-01

## 2018-01-01 RX ORDER — ENOXAPARIN SODIUM 100 MG/ML
40 INJECTION SUBCUTANEOUS EVERY 24 HOURS
Status: DISCONTINUED | OUTPATIENT
Start: 2018-01-01 | End: 2018-01-01 | Stop reason: HOSPADM

## 2018-01-01 RX ORDER — IPRATROPIUM BROMIDE AND ALBUTEROL SULFATE 2.5; .5 MG/3ML; MG/3ML
3 SOLUTION RESPIRATORY (INHALATION)
Status: DISCONTINUED | OUTPATIENT
Start: 2018-01-01 | End: 2018-01-01 | Stop reason: HOSPADM

## 2018-01-01 RX ORDER — LORAZEPAM 2 MG/ML
1 INJECTION INTRAMUSCULAR
Status: DISCONTINUED | OUTPATIENT
Start: 2018-01-01 | End: 2018-01-01 | Stop reason: HOSPADM

## 2018-01-01 RX ORDER — VANCOMYCIN HYDROCHLORIDE
1250
Status: COMPLETED | OUTPATIENT
Start: 2018-01-01 | End: 2018-01-01

## 2018-01-01 RX ORDER — DEXTROSE MONOHYDRATE AND SODIUM CHLORIDE 5; .9 G/100ML; G/100ML
50 INJECTION, SOLUTION INTRAVENOUS CONTINUOUS
Status: DISCONTINUED | OUTPATIENT
Start: 2018-01-01 | End: 2018-01-01

## 2018-01-01 RX ORDER — ONDANSETRON 2 MG/ML
8 INJECTION INTRAMUSCULAR; INTRAVENOUS
Status: COMPLETED | OUTPATIENT
Start: 2018-01-01 | End: 2018-01-01

## 2018-01-01 RX ORDER — SERTRALINE HYDROCHLORIDE 50 MG/1
50 TABLET, FILM COATED ORAL DAILY
COMMUNITY
End: 2018-01-01

## 2018-01-01 RX ORDER — ATORVASTATIN CALCIUM 20 MG/1
20 TABLET, FILM COATED ORAL DAILY
Status: DISCONTINUED | OUTPATIENT
Start: 2018-01-01 | End: 2018-01-01 | Stop reason: HOSPADM

## 2018-01-01 RX ORDER — LORAZEPAM 2 MG/ML
1 INJECTION INTRAMUSCULAR EVERY 4 HOURS
Status: DISCONTINUED | OUTPATIENT
Start: 2018-01-01 | End: 2018-01-01 | Stop reason: HOSPADM

## 2018-01-01 RX ORDER — MORPHINE SULFATE 10 MG/ML
10 INJECTION, SOLUTION INTRAMUSCULAR; INTRAVENOUS
Status: DISCONTINUED | OUTPATIENT
Start: 2018-01-01 | End: 2018-01-01

## 2018-01-01 RX ORDER — LORAZEPAM 2 MG/ML
0.5 INJECTION INTRAMUSCULAR
Qty: 1 VIAL | Refills: 0 | Status: SHIPPED
Start: 2018-01-01

## 2018-01-01 RX ORDER — SODIUM CHLORIDE 9 MG/ML
25 INJECTION, SOLUTION INTRAVENOUS AS NEEDED
Status: DISCONTINUED | OUTPATIENT
Start: 2018-01-01 | End: 2018-01-01

## 2018-01-01 RX ORDER — SODIUM CHLORIDE 0.9 % (FLUSH) 0.9 %
5 SYRINGE (ML) INJECTION AS NEEDED
Status: DISCONTINUED | OUTPATIENT
Start: 2018-01-01 | End: 2018-01-01 | Stop reason: HOSPADM

## 2018-01-01 RX ORDER — IPRATROPIUM BROMIDE AND ALBUTEROL SULFATE 2.5; .5 MG/3ML; MG/3ML
3 SOLUTION RESPIRATORY (INHALATION)
COMMUNITY

## 2018-01-01 RX ORDER — ASPIRIN 81 MG/1
81 TABLET ORAL DAILY
COMMUNITY
End: 2018-01-01

## 2018-01-01 RX ORDER — MUPIROCIN 20 MG/G
OINTMENT TOPICAL 2 TIMES DAILY
Status: COMPLETED | OUTPATIENT
Start: 2018-01-01 | End: 2018-01-01

## 2018-01-01 RX ORDER — VANCOMYCIN 1.75 GRAM/500 ML IN 0.9 % SODIUM CHLORIDE INTRAVENOUS
1750 ONCE
Status: DISCONTINUED | OUTPATIENT
Start: 2018-01-01 | End: 2018-01-01 | Stop reason: SDUPTHER

## 2018-01-01 RX ORDER — SODIUM CHLORIDE 0.9 % (FLUSH) 0.9 %
5-10 SYRINGE (ML) INJECTION EVERY 8 HOURS
Status: DISCONTINUED | OUTPATIENT
Start: 2018-01-01 | End: 2018-01-01

## 2018-01-01 RX ORDER — POTASSIUM CHLORIDE 29.8 MG/ML
20 INJECTION INTRAVENOUS
Status: COMPLETED | OUTPATIENT
Start: 2018-01-01 | End: 2018-01-01

## 2018-01-01 RX ORDER — ACETAMINOPHEN 325 MG/1
650 TABLET ORAL
COMMUNITY
End: 2018-01-01

## 2018-01-01 RX ORDER — ASPIRIN 81 MG/1
81 TABLET ORAL DAILY
Status: DISCONTINUED | OUTPATIENT
Start: 2018-01-01 | End: 2018-01-01 | Stop reason: HOSPADM

## 2018-01-01 RX ORDER — MORPHINE SULFATE 2 MG/ML
2 INJECTION, SOLUTION INTRAMUSCULAR; INTRAVENOUS
Qty: 1 ML | Refills: 0 | Status: SHIPPED
Start: 2018-01-01

## 2018-01-01 RX ORDER — LORAZEPAM 2 MG/ML
0.5 INJECTION INTRAMUSCULAR
Status: DISCONTINUED | OUTPATIENT
Start: 2018-01-01 | End: 2018-01-01 | Stop reason: HOSPADM

## 2018-01-01 RX ORDER — DEXTROSE MONOHYDRATE AND SODIUM CHLORIDE 5; .45 G/100ML; G/100ML
125 INJECTION, SOLUTION INTRAVENOUS CONTINUOUS
Status: DISCONTINUED | OUTPATIENT
Start: 2018-01-01 | End: 2018-01-01 | Stop reason: SDUPTHER

## 2018-01-01 RX ORDER — SODIUM CHLORIDE 9 MG/ML
50 INJECTION, SOLUTION INTRAVENOUS
Status: COMPLETED | OUTPATIENT
Start: 2018-01-01 | End: 2018-01-01

## 2018-01-01 RX ORDER — MAGNESIUM SULFATE HEPTAHYDRATE 40 MG/ML
2 INJECTION, SOLUTION INTRAVENOUS ONCE
Status: COMPLETED | OUTPATIENT
Start: 2018-01-01 | End: 2018-01-01

## 2018-01-01 RX ORDER — GLYCOPYRROLATE 0.2 MG/ML
0.2 INJECTION INTRAMUSCULAR; INTRAVENOUS EVERY 4 HOURS
Status: DISCONTINUED | OUTPATIENT
Start: 2018-01-01 | End: 2018-01-01 | Stop reason: HOSPADM

## 2018-01-01 RX ORDER — LEVOFLOXACIN 5 MG/ML
750 INJECTION, SOLUTION INTRAVENOUS EVERY 24 HOURS
Status: DISCONTINUED | OUTPATIENT
Start: 2018-01-01 | End: 2018-01-01

## 2018-01-01 RX ORDER — CEFDINIR 300 MG/1
300 CAPSULE ORAL 2 TIMES DAILY
Qty: 10 CAP | Refills: 0 | Status: SHIPPED
Start: 2018-01-01 | End: 2018-01-01

## 2018-01-01 RX ORDER — POTASSIUM CHLORIDE AND SODIUM CHLORIDE 450; 150 MG/100ML; MG/100ML
INJECTION, SOLUTION INTRAVENOUS CONTINUOUS
Status: DISCONTINUED | OUTPATIENT
Start: 2018-01-01 | End: 2018-01-01

## 2018-01-01 RX ORDER — LANOLIN ALCOHOL/MO/W.PET/CERES
1000 CREAM (GRAM) TOPICAL DAILY
COMMUNITY
End: 2018-01-01

## 2018-01-01 RX ORDER — SODIUM CHLORIDE, SODIUM LACTATE, POTASSIUM CHLORIDE, CALCIUM CHLORIDE 600; 310; 30; 20 MG/100ML; MG/100ML; MG/100ML; MG/100ML
125 INJECTION, SOLUTION INTRAVENOUS CONTINUOUS
Status: DISCONTINUED | OUTPATIENT
Start: 2018-01-01 | End: 2018-01-01

## 2018-01-01 RX ORDER — ACETAMINOPHEN 650 MG/1
650 SUPPOSITORY RECTAL
Status: DISCONTINUED | OUTPATIENT
Start: 2018-01-01 | End: 2018-01-01 | Stop reason: HOSPADM

## 2018-01-01 RX ORDER — DEXTROSE, SODIUM CHLORIDE, AND POTASSIUM CHLORIDE 5; .45; .15 G/100ML; G/100ML; G/100ML
100 INJECTION INTRAVENOUS CONTINUOUS
Status: DISCONTINUED | OUTPATIENT
Start: 2018-01-01 | End: 2018-01-01

## 2018-01-01 RX ORDER — CLONIDINE 0.1 MG/24H
1 PATCH, EXTENDED RELEASE TRANSDERMAL
Status: DISCONTINUED | OUTPATIENT
Start: 2018-01-01 | End: 2018-01-01

## 2018-01-01 RX ORDER — SODIUM CHLORIDE AND POTASSIUM CHLORIDE .9; .15 G/100ML; G/100ML
SOLUTION INTRAVENOUS CONTINUOUS
Status: DISCONTINUED | OUTPATIENT
Start: 2018-01-01 | End: 2018-01-01

## 2018-01-01 RX ORDER — DEXTROSE AND POTASSIUM CHLORIDE 5; .15 G/100ML; G/100ML
SOLUTION INTRAVENOUS CONTINUOUS
Status: DISCONTINUED | OUTPATIENT
Start: 2018-01-01 | End: 2018-01-01

## 2018-01-01 RX ORDER — POTASSIUM CHLORIDE 750 MG/1
TABLET, FILM COATED, EXTENDED RELEASE ORAL
Status: DISCONTINUED
Start: 2018-01-01 | End: 2018-01-01

## 2018-01-01 RX ORDER — LEVOTHYROXINE SODIUM 112 UG/1
112 TABLET ORAL
COMMUNITY
End: 2018-01-01

## 2018-01-01 RX ORDER — ASCORBIC ACID 500 MG
1000 TABLET ORAL DAILY
Status: DISCONTINUED | OUTPATIENT
Start: 2018-01-01 | End: 2018-01-01 | Stop reason: HOSPADM

## 2018-01-01 RX ORDER — LANOLIN ALCOHOL/MO/W.PET/CERES
1000 CREAM (GRAM) TOPICAL DAILY
Status: DISCONTINUED | OUTPATIENT
Start: 2018-01-01 | End: 2018-01-01 | Stop reason: HOSPADM

## 2018-01-01 RX ADMIN — MUPIROCIN: 20 OINTMENT TOPICAL at 11:12

## 2018-01-01 RX ADMIN — ENOXAPARIN SODIUM 40 MG: 40 INJECTION, SOLUTION INTRAVENOUS; SUBCUTANEOUS at 10:29

## 2018-01-01 RX ADMIN — POTASSIUM CHLORIDE 20 MEQ: 400 INJECTION, SOLUTION INTRAVENOUS at 08:52

## 2018-01-01 RX ADMIN — Medication 10 ML: at 15:20

## 2018-01-01 RX ADMIN — DEXTROSE MONOHYDRATE, SODIUM CHLORIDE, AND POTASSIUM CHLORIDE 100 ML/HR: 50; 4.5; 1.49 INJECTION, SOLUTION INTRAVENOUS at 09:49

## 2018-01-01 RX ADMIN — ENOXAPARIN SODIUM 40 MG: 40 INJECTION, SOLUTION INTRAVENOUS; SUBCUTANEOUS at 09:55

## 2018-01-01 RX ADMIN — VANCOMYCIN HYDROCHLORIDE 1500 MG: 10 INJECTION, POWDER, LYOPHILIZED, FOR SOLUTION INTRAVENOUS at 16:31

## 2018-01-01 RX ADMIN — CEFTRIAXONE 1 G: 1 INJECTION, POWDER, FOR SOLUTION INTRAMUSCULAR; INTRAVENOUS at 09:54

## 2018-01-01 RX ADMIN — SODIUM PHOSPHATE, MONOBASIC, MONOHYDRATE: 276; 142 INJECTION, SOLUTION INTRAVENOUS at 17:20

## 2018-01-01 RX ADMIN — ENOXAPARIN SODIUM 40 MG: 40 INJECTION, SOLUTION INTRAVENOUS; SUBCUTANEOUS at 10:25

## 2018-01-01 RX ADMIN — VANCOMYCIN HYDROCHLORIDE 750 MG: 750 INJECTION, POWDER, LYOPHILIZED, FOR SOLUTION INTRAVENOUS at 01:42

## 2018-01-01 RX ADMIN — HEPARIN SODIUM 5000 UNITS: 5000 INJECTION INTRAVENOUS; SUBCUTANEOUS at 09:49

## 2018-01-01 RX ADMIN — ATORVASTATIN CALCIUM 20 MG: 20 TABLET, FILM COATED ORAL at 09:43

## 2018-01-01 RX ADMIN — ASPIRIN 81 MG: 81 TABLET, COATED ORAL at 10:28

## 2018-01-01 RX ADMIN — ACETAMINOPHEN 650 MG: 325 TABLET ORAL at 21:51

## 2018-01-01 RX ADMIN — Medication 5 ML: at 23:50

## 2018-01-01 RX ADMIN — Medication 10 ML: at 03:27

## 2018-01-01 RX ADMIN — POTASSIUM CHLORIDE 10 MEQ: 10 INJECTION, SOLUTION INTRAVENOUS at 11:33

## 2018-01-01 RX ADMIN — SODIUM CHLORIDE AND POTASSIUM CHLORIDE: 9; 1.49 INJECTION, SOLUTION INTRAVENOUS at 20:23

## 2018-01-01 RX ADMIN — SODIUM PHOSPHATE, MONOBASIC, MONOHYDRATE: 276; 142 INJECTION, SOLUTION INTRAVENOUS at 13:17

## 2018-01-01 RX ADMIN — CEFEPIME HYDROCHLORIDE 2 G: 2 INJECTION, POWDER, FOR SOLUTION INTRAVENOUS at 16:01

## 2018-01-01 RX ADMIN — GLYCOPYRROLATE 0.2 MG: 0.2 INJECTION, SOLUTION INTRAMUSCULAR; INTRAVENOUS at 11:46

## 2018-01-01 RX ADMIN — LORAZEPAM 1 MG: 2 INJECTION INTRAMUSCULAR; INTRAVENOUS at 11:45

## 2018-01-01 RX ADMIN — HEPARIN SODIUM 5000 UNITS: 5000 INJECTION INTRAVENOUS; SUBCUTANEOUS at 00:00

## 2018-01-01 RX ADMIN — MUPIROCIN: 20 OINTMENT TOPICAL at 17:21

## 2018-01-01 RX ADMIN — PHENYLEPHRINE HYDROCHLORIDE 10 MCG/MIN: 10 INJECTION INTRAVENOUS at 12:45

## 2018-01-01 RX ADMIN — ENOXAPARIN SODIUM 40 MG: 40 INJECTION, SOLUTION INTRAVENOUS; SUBCUTANEOUS at 08:49

## 2018-01-01 RX ADMIN — Medication 10 ML: at 14:00

## 2018-01-01 RX ADMIN — HEPARIN SODIUM 5000 UNITS: 5000 INJECTION INTRAVENOUS; SUBCUTANEOUS at 16:03

## 2018-01-01 RX ADMIN — LEVOFLOXACIN 750 MG: 5 INJECTION, SOLUTION INTRAVENOUS at 10:56

## 2018-01-01 RX ADMIN — MUPIROCIN: 20 OINTMENT TOPICAL at 07:48

## 2018-01-01 RX ADMIN — Medication 10 ML: at 20:42

## 2018-01-01 RX ADMIN — LORAZEPAM 1 MG: 2 INJECTION INTRAMUSCULAR; INTRAVENOUS at 16:06

## 2018-01-01 RX ADMIN — LEVOTHYROXINE SODIUM 112 MCG: 112 TABLET ORAL at 09:43

## 2018-01-01 RX ADMIN — CEFTRIAXONE SODIUM 1 G: 1 INJECTION, POWDER, FOR SOLUTION INTRAMUSCULAR; INTRAVENOUS at 01:42

## 2018-01-01 RX ADMIN — IOPAMIDOL 100 ML: 755 INJECTION, SOLUTION INTRAVENOUS at 03:28

## 2018-01-01 RX ADMIN — SODIUM CHLORIDE 1000 ML: 900 INJECTION, SOLUTION INTRAVENOUS at 04:29

## 2018-01-01 RX ADMIN — Medication 10 ML: at 20:23

## 2018-01-01 RX ADMIN — PIPERACILLIN SODIUM,TAZOBACTAM SODIUM 3.38 G: 3; .375 INJECTION, POWDER, FOR SOLUTION INTRAVENOUS at 21:27

## 2018-01-01 RX ADMIN — VANCOMYCIN HYDROCHLORIDE 1500 MG: 10 INJECTION, POWDER, LYOPHILIZED, FOR SOLUTION INTRAVENOUS at 16:00

## 2018-01-01 RX ADMIN — LORAZEPAM 0.5 MG: 2 INJECTION INTRAMUSCULAR; INTRAVENOUS at 09:04

## 2018-01-01 RX ADMIN — HEPARIN SODIUM 5000 UNITS: 5000 INJECTION INTRAVENOUS; SUBCUTANEOUS at 09:30

## 2018-01-01 RX ADMIN — Medication 10 ML: at 05:29

## 2018-01-01 RX ADMIN — SODIUM CHLORIDE AND POTASSIUM CHLORIDE: 9; 1.49 INJECTION, SOLUTION INTRAVENOUS at 20:52

## 2018-01-01 RX ADMIN — ENOXAPARIN SODIUM 40 MG: 40 INJECTION, SOLUTION INTRAVENOUS; SUBCUTANEOUS at 09:44

## 2018-01-01 RX ADMIN — DEXTROSE MONOHYDRATE AND SODIUM CHLORIDE 125 ML/HR: 5; .45 INJECTION, SOLUTION INTRAVENOUS at 09:25

## 2018-01-01 RX ADMIN — CEFEPIME HYDROCHLORIDE 2 G: 2 INJECTION, POWDER, FOR SOLUTION INTRAVENOUS at 15:07

## 2018-01-01 RX ADMIN — HEPARIN SODIUM 5000 UNITS: 5000 INJECTION INTRAVENOUS; SUBCUTANEOUS at 16:11

## 2018-01-01 RX ADMIN — DEXTROSE MONOHYDRATE, SODIUM CHLORIDE, AND POTASSIUM CHLORIDE 100 ML/HR: 50; 4.5; 1.49 INJECTION, SOLUTION INTRAVENOUS at 00:20

## 2018-01-01 RX ADMIN — SODIUM CHLORIDE AND POTASSIUM CHLORIDE: 4.5; 1.49 INJECTION, SOLUTION INTRAVENOUS at 03:33

## 2018-01-01 RX ADMIN — VANCOMYCIN HYDROCHLORIDE 750 MG: 750 INJECTION, POWDER, LYOPHILIZED, FOR SOLUTION INTRAVENOUS at 18:07

## 2018-01-01 RX ADMIN — POTASSIUM CHLORIDE 10 MEQ: 10 INJECTION, SOLUTION INTRAVENOUS at 10:55

## 2018-01-01 RX ADMIN — MORPHINE SULFATE 10 MG: 100 SOLUTION ORAL at 13:44

## 2018-01-01 RX ADMIN — ASPIRIN 81 MG: 81 TABLET, COATED ORAL at 09:43

## 2018-01-01 RX ADMIN — GLYCOPYRROLATE 0.2 MG: 0.2 INJECTION, SOLUTION INTRAMUSCULAR; INTRAVENOUS at 20:42

## 2018-01-01 RX ADMIN — MUPIROCIN: 20 OINTMENT TOPICAL at 16:13

## 2018-01-01 RX ADMIN — CEFEPIME HYDROCHLORIDE 2 G: 2 INJECTION, POWDER, FOR SOLUTION INTRAVENOUS at 04:08

## 2018-01-01 RX ADMIN — POTASSIUM CHLORIDE 10 MEQ: 10 INJECTION, SOLUTION INTRAVENOUS at 12:00

## 2018-01-01 RX ADMIN — VANCOMYCIN HYDROCHLORIDE 750 MG: 750 INJECTION, POWDER, LYOPHILIZED, FOR SOLUTION INTRAVENOUS at 07:49

## 2018-01-01 RX ADMIN — LEVOTHYROXINE SODIUM 112 MCG: 112 TABLET ORAL at 09:54

## 2018-01-01 RX ADMIN — POTASSIUM CHLORIDE, DEXTROSE MONOHYDRATE AND SODIUM CHLORIDE 75 ML/HR: 150; 5; 450 INJECTION, SOLUTION INTRAVENOUS at 09:37

## 2018-01-01 RX ADMIN — SERTRALINE HYDROCHLORIDE 50 MG: 50 TABLET ORAL at 08:49

## 2018-01-01 RX ADMIN — POTASSIUM CHLORIDE 40 MEQ: 20 TABLET, EXTENDED RELEASE ORAL at 01:49

## 2018-01-01 RX ADMIN — Medication 10 ML: at 05:55

## 2018-01-01 RX ADMIN — Medication 10 ML: at 21:27

## 2018-01-01 RX ADMIN — LEVOTHYROXINE SODIUM 112 MCG: 112 TABLET ORAL at 09:23

## 2018-01-01 RX ADMIN — CEFTRIAXONE 1 G: 1 INJECTION, POWDER, FOR SOLUTION INTRAMUSCULAR; INTRAVENOUS at 08:49

## 2018-01-01 RX ADMIN — PIPERACILLIN SODIUM,TAZOBACTAM SODIUM 3.38 G: 3; .375 INJECTION, POWDER, FOR SOLUTION INTRAVENOUS at 10:25

## 2018-01-01 RX ADMIN — Medication 1 SPRAY: at 02:01

## 2018-01-01 RX ADMIN — MORPHINE SULFATE 2 MG: 2 INJECTION, SOLUTION INTRAMUSCULAR; INTRAVENOUS at 16:06

## 2018-01-01 RX ADMIN — MORPHINE SULFATE 2 MG: 2 INJECTION, SOLUTION INTRAMUSCULAR; INTRAVENOUS at 09:05

## 2018-01-01 RX ADMIN — SODIUM CHLORIDE, SODIUM LACTATE, POTASSIUM CHLORIDE, AND CALCIUM CHLORIDE 125 ML/HR: 600; 310; 30; 20 INJECTION, SOLUTION INTRAVENOUS at 23:00

## 2018-01-01 RX ADMIN — SODIUM CHLORIDE AND POTASSIUM CHLORIDE: 9; 1.49 INJECTION, SOLUTION INTRAVENOUS at 06:15

## 2018-01-01 RX ADMIN — SODIUM CHLORIDE: 900 INJECTION, SOLUTION INTRAVENOUS at 08:52

## 2018-01-01 RX ADMIN — CEFEPIME HYDROCHLORIDE 2 G: 2 INJECTION, POWDER, FOR SOLUTION INTRAVENOUS at 03:25

## 2018-01-01 RX ADMIN — SODIUM CHLORIDE 250 ML: 900 INJECTION, SOLUTION INTRAVENOUS at 00:28

## 2018-01-01 RX ADMIN — ASPIRIN 81 MG: 81 TABLET, COATED ORAL at 09:46

## 2018-01-01 RX ADMIN — Medication 10 ML: at 05:32

## 2018-01-01 RX ADMIN — ONDANSETRON HYDROCHLORIDE 8 MG: 2 INJECTION, SOLUTION INTRAMUSCULAR; INTRAVENOUS at 02:36

## 2018-01-01 RX ADMIN — Medication 10 ML: at 08:50

## 2018-01-01 RX ADMIN — OXYCODONE HYDROCHLORIDE AND ACETAMINOPHEN 1000 MG: 500 TABLET ORAL at 09:23

## 2018-01-01 RX ADMIN — Medication 10 ML: at 02:08

## 2018-01-01 RX ADMIN — VANCOMYCIN HYDROCHLORIDE 2000 MG: 10 INJECTION, POWDER, LYOPHILIZED, FOR SOLUTION INTRAVENOUS at 04:21

## 2018-01-01 RX ADMIN — ASPIRIN 81 MG: 81 TABLET, COATED ORAL at 09:38

## 2018-01-01 RX ADMIN — CEFEPIME HYDROCHLORIDE 2 G: 2 INJECTION, POWDER, FOR SOLUTION INTRAVENOUS at 05:22

## 2018-01-01 RX ADMIN — MORPHINE SULFATE 10 MG: 100 SOLUTION ORAL at 15:03

## 2018-01-01 RX ADMIN — CEFEPIME HYDROCHLORIDE 2 G: 2 INJECTION, POWDER, FOR SOLUTION INTRAVENOUS at 13:37

## 2018-01-01 RX ADMIN — LORAZEPAM 1 MG: 2 INJECTION INTRAMUSCULAR; INTRAVENOUS at 20:42

## 2018-01-01 RX ADMIN — SODIUM CHLORIDE AND POTASSIUM CHLORIDE: 9; 1.49 INJECTION, SOLUTION INTRAVENOUS at 04:53

## 2018-01-01 RX ADMIN — ATORVASTATIN CALCIUM 20 MG: 20 TABLET, FILM COATED ORAL at 09:46

## 2018-01-01 RX ADMIN — ASPIRIN 81 MG: 81 TABLET, COATED ORAL at 09:54

## 2018-01-01 RX ADMIN — Medication 10 ML: at 21:58

## 2018-01-01 RX ADMIN — ACETAMINOPHEN 650 MG: 325 TABLET ORAL at 17:49

## 2018-01-01 RX ADMIN — Medication 10 ML: at 13:35

## 2018-01-01 RX ADMIN — SODIUM CHLORIDE, SODIUM LACTATE, POTASSIUM CHLORIDE, AND CALCIUM CHLORIDE 125 ML/HR: 600; 310; 30; 20 INJECTION, SOLUTION INTRAVENOUS at 04:58

## 2018-01-01 RX ADMIN — PIPERACILLIN SODIUM,TAZOBACTAM SODIUM 4.5 G: 4; .5 INJECTION, POWDER, FOR SOLUTION INTRAVENOUS at 04:19

## 2018-01-01 RX ADMIN — MORPHINE SULFATE 2 MG: 2 INJECTION, SOLUTION INTRAMUSCULAR; INTRAVENOUS at 19:19

## 2018-01-01 RX ADMIN — VANCOMYCIN HYDROCHLORIDE 1250 MG: 10 INJECTION, POWDER, LYOPHILIZED, FOR SOLUTION INTRAVENOUS at 18:00

## 2018-01-01 RX ADMIN — CEFEPIME HYDROCHLORIDE 2 G: 2 INJECTION, POWDER, FOR SOLUTION INTRAVENOUS at 16:11

## 2018-01-01 RX ADMIN — SODIUM CHLORIDE 1000 ML: 900 INJECTION, SOLUTION INTRAVENOUS at 13:11

## 2018-01-01 RX ADMIN — Medication 1 SPRAY: at 17:53

## 2018-01-01 RX ADMIN — VANCOMYCIN HYDROCHLORIDE 750 MG: 750 INJECTION, POWDER, LYOPHILIZED, FOR SOLUTION INTRAVENOUS at 00:31

## 2018-01-01 RX ADMIN — LORAZEPAM 1 MG: 2 INJECTION INTRAMUSCULAR; INTRAVENOUS at 23:47

## 2018-01-01 RX ADMIN — POTASSIUM CHLORIDE 10 MEQ: 10 INJECTION, SOLUTION INTRAVENOUS at 13:31

## 2018-01-01 RX ADMIN — Medication 5 ML: at 20:42

## 2018-01-01 RX ADMIN — Medication 1 SPRAY: at 23:47

## 2018-01-01 RX ADMIN — Medication 1 SPRAY: at 16:06

## 2018-01-01 RX ADMIN — MORPHINE SULFATE 2 MG: 2 INJECTION, SOLUTION INTRAMUSCULAR; INTRAVENOUS at 23:47

## 2018-01-01 RX ADMIN — HEPARIN SODIUM 5000 UNITS: 5000 INJECTION INTRAVENOUS; SUBCUTANEOUS at 07:49

## 2018-01-01 RX ADMIN — VANCOMYCIN HYDROCHLORIDE 1500 MG: 10 INJECTION, POWDER, LYOPHILIZED, FOR SOLUTION INTRAVENOUS at 04:22

## 2018-01-01 RX ADMIN — Medication 10 ML: at 19:33

## 2018-01-01 RX ADMIN — PIPERACILLIN SODIUM,TAZOBACTAM SODIUM 3.38 G: 3; .375 INJECTION, POWDER, FOR SOLUTION INTRAVENOUS at 17:23

## 2018-01-01 RX ADMIN — DEXTROSE MONOHYDRATE AND POTASSIUM CHLORIDE: 5; .149 INJECTION, SOLUTION INTRAVENOUS at 01:08

## 2018-01-01 RX ADMIN — SODIUM CHLORIDE 50 ML/HR: 900 INJECTION, SOLUTION INTRAVENOUS at 03:27

## 2018-01-01 RX ADMIN — Medication 10 ML: at 21:46

## 2018-01-01 RX ADMIN — Medication 1 SPRAY: at 11:48

## 2018-01-01 RX ADMIN — CEFEPIME HYDROCHLORIDE 2 G: 2 INJECTION, POWDER, FOR SOLUTION INTRAVENOUS at 04:01

## 2018-01-01 RX ADMIN — Medication 10 ML: at 20:52

## 2018-01-01 RX ADMIN — ATORVASTATIN CALCIUM 20 MG: 20 TABLET, FILM COATED ORAL at 09:23

## 2018-01-01 RX ADMIN — VANCOMYCIN HYDROCHLORIDE 1250 MG: 10 INJECTION, POWDER, LYOPHILIZED, FOR SOLUTION INTRAVENOUS at 15:49

## 2018-01-01 RX ADMIN — MAGNESIUM SULFATE IN WATER 2 G: 40 INJECTION, SOLUTION INTRAVENOUS at 11:37

## 2018-01-01 RX ADMIN — MUPIROCIN: 20 OINTMENT TOPICAL at 18:00

## 2018-01-01 RX ADMIN — Medication 10 ML: at 05:07

## 2018-01-01 RX ADMIN — Medication 10 ML: at 03:26

## 2018-01-01 RX ADMIN — HEPARIN SODIUM 5000 UNITS: 5000 INJECTION INTRAVENOUS; SUBCUTANEOUS at 15:08

## 2018-01-01 RX ADMIN — Medication 10 ML: at 09:39

## 2018-01-01 RX ADMIN — SODIUM CHLORIDE AND POTASSIUM CHLORIDE: 9; 1.49 INJECTION, SOLUTION INTRAVENOUS at 19:22

## 2018-01-01 RX ADMIN — CEFTRIAXONE 1 G: 1 INJECTION, POWDER, FOR SOLUTION INTRAMUSCULAR; INTRAVENOUS at 10:29

## 2018-01-01 RX ADMIN — CEFEPIME HYDROCHLORIDE 2 G: 2 INJECTION, POWDER, FOR SOLUTION INTRAVENOUS at 15:13

## 2018-01-01 RX ADMIN — MORPHINE SULFATE 2 MG: 2 INJECTION, SOLUTION INTRAMUSCULAR; INTRAVENOUS at 20:43

## 2018-01-01 RX ADMIN — ASPIRIN 81 MG: 81 TABLET, COATED ORAL at 08:49

## 2018-01-01 RX ADMIN — ENOXAPARIN SODIUM 40 MG: 40 INJECTION, SOLUTION INTRAVENOUS; SUBCUTANEOUS at 09:23

## 2018-01-01 RX ADMIN — Medication 10 ML: at 05:52

## 2018-01-01 RX ADMIN — VANCOMYCIN HYDROCHLORIDE 1500 MG: 10 INJECTION, POWDER, LYOPHILIZED, FOR SOLUTION INTRAVENOUS at 05:36

## 2018-01-01 RX ADMIN — PIPERACILLIN SODIUM,TAZOBACTAM SODIUM 3.38 G: 3; .375 INJECTION, POWDER, FOR SOLUTION INTRAVENOUS at 02:04

## 2018-01-01 RX ADMIN — SODIUM PHOSPHATE, MONOBASIC, MONOHYDRATE AND SODIUM PHOSPHATE, DIBASIC ANHYDROUS: 276; 142 INJECTION, SOLUTION INTRAVENOUS at 23:27

## 2018-01-01 RX ADMIN — MORPHINE SULFATE 2 MG: 2 INJECTION, SOLUTION INTRAMUSCULAR; INTRAVENOUS at 11:40

## 2018-01-01 RX ADMIN — ATORVASTATIN CALCIUM 20 MG: 20 TABLET, FILM COATED ORAL at 08:49

## 2018-01-01 RX ADMIN — Medication 10 ML: at 22:09

## 2018-01-01 RX ADMIN — Medication 1 SPRAY: at 22:38

## 2018-01-01 RX ADMIN — MUPIROCIN: 20 OINTMENT TOPICAL at 09:49

## 2018-01-01 RX ADMIN — MUPIROCIN: 20 OINTMENT TOPICAL at 12:18

## 2018-01-01 RX ADMIN — Medication 10 ML: at 21:35

## 2018-01-01 RX ADMIN — PIPERACILLIN SODIUM,TAZOBACTAM SODIUM 3.38 G: 3; .375 INJECTION, POWDER, FOR SOLUTION INTRAVENOUS at 10:28

## 2018-01-01 RX ADMIN — Medication 10 ML: at 13:33

## 2018-01-01 RX ADMIN — Medication 10 ML: at 10:25

## 2018-01-01 RX ADMIN — Medication 10 ML: at 13:03

## 2018-01-01 RX ADMIN — MAGNESIUM SULFATE HEPTAHYDRATE 2 G: 40 INJECTION, SOLUTION INTRAVENOUS at 09:49

## 2018-01-01 RX ADMIN — Medication 10 ML: at 14:01

## 2018-01-01 RX ADMIN — PIPERACILLIN SODIUM,TAZOBACTAM SODIUM 3.38 G: 3; .375 INJECTION, POWDER, FOR SOLUTION INTRAVENOUS at 09:40

## 2018-01-01 RX ADMIN — VANCOMYCIN HYDROCHLORIDE 1250 MG: 10 INJECTION, POWDER, LYOPHILIZED, FOR SOLUTION INTRAVENOUS at 05:07

## 2018-01-01 RX ADMIN — Medication 10 ML: at 14:56

## 2018-01-01 RX ADMIN — SODIUM CHLORIDE 1000 ML: 900 INJECTION, SOLUTION INTRAVENOUS at 02:36

## 2018-01-01 RX ADMIN — VANCOMYCIN HYDROCHLORIDE 750 MG: 750 INJECTION, POWDER, LYOPHILIZED, FOR SOLUTION INTRAVENOUS at 12:27

## 2018-01-01 RX ADMIN — Medication 1 SPRAY: at 20:42

## 2018-01-01 RX ADMIN — LIDOCAINE HYDROCHLORIDE 10 ML: 10 INJECTION, SOLUTION EPIDURAL; INFILTRATION; INTRACAUDAL; PERINEURAL at 12:20

## 2018-01-01 RX ADMIN — MUPIROCIN: 20 OINTMENT TOPICAL at 18:12

## 2018-01-01 RX ADMIN — LORAZEPAM 0.5 MG: 2 INJECTION INTRAMUSCULAR; INTRAVENOUS at 14:33

## 2018-01-01 RX ADMIN — SODIUM CHLORIDE 1000 ML: 900 INJECTION, SOLUTION INTRAVENOUS at 10:28

## 2018-01-01 RX ADMIN — HEPARIN SODIUM 5000 UNITS: 5000 INJECTION INTRAVENOUS; SUBCUTANEOUS at 11:12

## 2018-01-01 RX ADMIN — LEVOTHYROXINE SODIUM 112 MCG: 112 TABLET ORAL at 10:28

## 2018-01-01 RX ADMIN — PIPERACILLIN SODIUM,TAZOBACTAM SODIUM 3.38 G: 3; .375 INJECTION, POWDER, FOR SOLUTION INTRAVENOUS at 01:33

## 2018-01-01 RX ADMIN — MORPHINE SULFATE 2 MG: 2 INJECTION, SOLUTION INTRAMUSCULAR; INTRAVENOUS at 11:46

## 2018-01-01 RX ADMIN — HEPARIN SODIUM 5000 UNITS: 5000 INJECTION INTRAVENOUS; SUBCUTANEOUS at 00:51

## 2018-01-01 RX ADMIN — DEXTROSE MONOHYDRATE AND POTASSIUM CHLORIDE: 5; .149 INJECTION, SOLUTION INTRAVENOUS at 09:49

## 2018-01-01 RX ADMIN — Medication 10 ML: at 05:10

## 2018-01-01 RX ADMIN — HEPARIN SODIUM 5000 UNITS: 5000 INJECTION INTRAVENOUS; SUBCUTANEOUS at 23:01

## 2018-01-01 RX ADMIN — SODIUM CHLORIDE 1000 ML: 900 INJECTION, SOLUTION INTRAVENOUS at 03:34

## 2018-01-01 RX ADMIN — GLYCOPYRROLATE 0.2 MG: 0.2 INJECTION, SOLUTION INTRAMUSCULAR; INTRAVENOUS at 16:06

## 2018-01-01 RX ADMIN — Medication 10 ML: at 15:08

## 2018-01-01 RX ADMIN — Medication 10 ML: at 21:12

## 2018-01-01 RX ADMIN — LEVOTHYROXINE SODIUM 112 MCG: 112 TABLET ORAL at 09:46

## 2018-01-01 RX ADMIN — POTASSIUM CHLORIDE 10 MEQ: 10 INJECTION, SOLUTION INTRAVENOUS at 11:52

## 2018-01-01 RX ADMIN — ATORVASTATIN CALCIUM 20 MG: 20 TABLET, FILM COATED ORAL at 09:54

## 2018-01-01 RX ADMIN — MORPHINE SULFATE 2 MG: 2 INJECTION, SOLUTION INTRAMUSCULAR; INTRAVENOUS at 05:55

## 2018-01-01 RX ADMIN — GLYCOPYRROLATE 0.2 MG: 0.2 INJECTION, SOLUTION INTRAMUSCULAR; INTRAVENOUS at 23:47

## 2018-01-01 RX ADMIN — PIPERACILLIN SODIUM,TAZOBACTAM SODIUM 3.38 G: 3; .375 INJECTION, POWDER, FOR SOLUTION INTRAVENOUS at 02:15

## 2018-01-01 RX ADMIN — MUPIROCIN: 20 OINTMENT TOPICAL at 20:20

## 2018-01-01 RX ADMIN — CEFEPIME HYDROCHLORIDE 2 G: 2 INJECTION, POWDER, FOR SOLUTION INTRAVENOUS at 02:20

## 2018-01-01 RX ADMIN — PIPERACILLIN SODIUM,TAZOBACTAM SODIUM 3.38 G: 3; .375 INJECTION, POWDER, FOR SOLUTION INTRAVENOUS at 18:39

## 2018-01-01 RX ADMIN — CEFTRIAXONE 1 G: 1 INJECTION, POWDER, FOR SOLUTION INTRAMUSCULAR; INTRAVENOUS at 09:46

## 2018-01-01 RX ADMIN — Medication 10 ML: at 09:35

## 2018-01-01 RX ADMIN — POTASSIUM CHLORIDE 10 MEQ: 10 INJECTION, SOLUTION INTRAVENOUS at 12:38

## 2018-01-01 RX ADMIN — MUPIROCIN: 20 OINTMENT TOPICAL at 09:30

## 2018-01-01 RX ADMIN — LEVOTHYROXINE SODIUM 112 MCG: 112 TABLET ORAL at 06:54

## 2018-01-01 RX ADMIN — SODIUM CHLORIDE, SODIUM LACTATE, POTASSIUM CHLORIDE, AND CALCIUM CHLORIDE 125 ML/HR: 600; 310; 30; 20 INJECTION, SOLUTION INTRAVENOUS at 14:58

## 2018-01-01 RX ADMIN — CYANOCOBALAMIN TAB 500 MCG 1000 MCG: 500 TAB at 09:23

## 2018-01-01 RX ADMIN — POTASSIUM CHLORIDE 20 MEQ: 400 INJECTION, SOLUTION INTRAVENOUS at 09:59

## 2018-01-01 RX ADMIN — Medication 10 ML: at 06:16

## 2018-01-01 RX ADMIN — HEPARIN SODIUM 5000 UNITS: 5000 INJECTION INTRAVENOUS; SUBCUTANEOUS at 17:48

## 2018-01-01 RX ADMIN — HEPARIN SODIUM 5000 UNITS: 5000 INJECTION INTRAVENOUS; SUBCUTANEOUS at 17:15

## 2018-01-01 RX ADMIN — CEFTRIAXONE 1 G: 1 INJECTION, POWDER, FOR SOLUTION INTRAMUSCULAR; INTRAVENOUS at 09:23

## 2018-01-01 RX ADMIN — DEXTROSE MONOHYDRATE AND SODIUM CHLORIDE 125 ML/HR: 5; .45 INJECTION, SOLUTION INTRAVENOUS at 18:05

## 2018-01-01 RX ADMIN — HEPARIN SODIUM 5000 UNITS: 5000 INJECTION INTRAVENOUS; SUBCUTANEOUS at 09:34

## 2018-01-01 RX ADMIN — DEXTROSE MONOHYDRATE AND POTASSIUM CHLORIDE: 5; .149 INJECTION, SOLUTION INTRAVENOUS at 15:07

## 2018-01-01 RX ADMIN — HEPARIN SODIUM 5000 UNITS: 5000 INJECTION INTRAVENOUS; SUBCUTANEOUS at 00:35

## 2018-01-01 RX ADMIN — DEXTROSE MONOHYDRATE AND POTASSIUM CHLORIDE: 5; .149 INJECTION, SOLUTION INTRAVENOUS at 12:27

## 2018-01-01 RX ADMIN — ENOXAPARIN SODIUM 40 MG: 40 INJECTION, SOLUTION INTRAVENOUS; SUBCUTANEOUS at 09:46

## 2018-01-01 RX ADMIN — POTASSIUM CHLORIDE 10 MEQ: 10 INJECTION, SOLUTION INTRAVENOUS at 10:17

## 2018-01-01 RX ADMIN — Medication 10 ML: at 06:04

## 2018-01-01 RX ADMIN — SERTRALINE HYDROCHLORIDE 50 MG: 50 TABLET ORAL at 09:23

## 2018-01-01 RX ADMIN — SODIUM CHLORIDE AND POTASSIUM CHLORIDE: 9; 1.49 INJECTION, SOLUTION INTRAVENOUS at 06:18

## 2018-01-01 RX ADMIN — POTASSIUM CHLORIDE 10 MEQ: 10 INJECTION, SOLUTION INTRAVENOUS at 14:00

## 2018-01-01 RX ADMIN — HEPARIN SODIUM 5000 UNITS: 5000 INJECTION INTRAVENOUS; SUBCUTANEOUS at 00:57

## 2018-01-01 RX ADMIN — CYANOCOBALAMIN TAB 500 MCG 1000 MCG: 500 TAB at 08:49

## 2018-01-01 RX ADMIN — Medication 10 ML: at 06:30

## 2018-01-01 RX ADMIN — SODIUM CHLORIDE 125 ML/HR: 450 INJECTION, SOLUTION INTRAVENOUS at 08:52

## 2018-01-01 RX ADMIN — ACETAMINOPHEN 650 MG: 325 TABLET ORAL at 09:46

## 2018-01-01 RX ADMIN — SODIUM CHLORIDE AND POTASSIUM CHLORIDE: 9; 1.49 INJECTION, SOLUTION INTRAVENOUS at 18:59

## 2018-01-01 RX ADMIN — NOREPINEPHRINE BITARTRATE 2 MCG/MIN: 1 INJECTION, SOLUTION, CONCENTRATE INTRAVENOUS at 13:58

## 2018-01-01 RX ADMIN — LEVOFLOXACIN 750 MG: 5 INJECTION, SOLUTION INTRAVENOUS at 09:54

## 2018-01-01 RX ADMIN — DEXTROSE MONOHYDRATE AND SODIUM CHLORIDE 125 ML/HR: 5; .45 INJECTION, SOLUTION INTRAVENOUS at 00:29

## 2018-01-01 RX ADMIN — OXYCODONE HYDROCHLORIDE AND ACETAMINOPHEN 1000 MG: 500 TABLET ORAL at 08:49

## 2018-01-01 RX ADMIN — ATORVASTATIN CALCIUM 20 MG: 20 TABLET, FILM COATED ORAL at 10:29

## 2018-10-10 NOTE — ED PROVIDER NOTES
EMERGENCY DEPARTMENT HISTORY AND PHYSICAL EXAM 
 
 
Date: 10/10/2018 Patient Name: Edis Pham History of Presenting Illness Chief Complaint Patient presents with  Fall  
  pt comes via EMS from [address], per EMS pt is very \"fidgety\" and states he fell forward out of wheelchair this morning hitting his head. EMS denies LOC, pt with dementia at baseline and per EMS pt at baseline mentation  Laceration  
  to left forehead and skin tear to R anterior hand History Provided By: EMS and Nursing home records HPI: Edis Pham, 82 y.o. male with PMHx significant for dementia, neurogenic bladder, hypothyroidism, and HLD , presents via EMS to the ED having slipped out of his wheelchair that the nursing home. He is wheelchair bound with advanced dementia. EMS and nursing home staff report that his mental status is currently at baseline. He has skin tear to the right hand and a laceration of the left forehead. ROS is limited due to dementia. PCP: Crystal Shah MD 
 
Current Outpatient Prescriptions Medication Sig Dispense Refill  levothyroxine (SYNTHROID) 112 mcg tablet Take 112 mcg by mouth Daily (before breakfast).  aspirin delayed-release 81 mg tablet Take 81 mg by mouth daily.  sertraline (ZOLOFT) 50 mg tablet Take 50 mg by mouth daily.  cyanocobalamin (VITAMIN B-12) 1,000 mcg tablet Take 1,000 mcg by mouth daily.  ascorbic acid, vitamin C, (VITAMIN C) 1,000 mg tablet Take 1,000 mg by mouth daily.  atorvastatin (LIPITOR) 20 mg tablet Take 20 mg by mouth daily. Past History Past Medical History: No past medical history on file. Past Surgical History: No past surgical history on file. Family History: No family history on file. Social History: 
Social History Substance Use Topics  Smoking status: Not on file  Smokeless tobacco: Not on file  Alcohol use Not on file Allergies: 
No Known Allergies Review of Systems Review of Systems Unable to perform ROS: Dementia Physical Exam  
Physical Exam  
Constitutional: No distress. Elderly frail appearing male lying on the stretcher pulling at wires and bandages HENT:  
Head: Normocephalic. Head is with laceration. Head is without raccoon's eyes, without Pichardo's sign, without abrasion, without contusion, without right periorbital erythema and without left periorbital erythema. Right Ear: External ear normal.  
Left Ear: External ear normal.  
Mouth/Throat: Oropharynx is clear and moist. No oropharyngeal exudate. Eyes: Conjunctivae and EOM are normal. Pupils are equal, round, and reactive to light. Right eye exhibits no discharge. Left eye exhibits no discharge. No scleral icterus. Neck: Normal range of motion. Neck supple. No JVD present. No tracheal deviation present. No thyromegaly present. Cardiovascular: Normal rate, regular rhythm, normal heart sounds, intact distal pulses and normal pulses. Exam reveals no gallop and no friction rub. No murmur heard. Pulmonary/Chest: Effort normal and breath sounds normal. No stridor. No respiratory distress. He has no decreased breath sounds. He has no wheezes. He has no rhonchi. He has no rales. He exhibits no tenderness. Abdominal: Soft. Bowel sounds are normal. He exhibits no distension and no mass. There is no tenderness. There is no rigidity, no rebound, no guarding, no tenderness at McBurney's point and negative Martin's sign. Musculoskeletal: Normal range of motion. He exhibits no edema or tenderness. Lymphadenopathy:  
  He has no cervical adenopathy. Neurological: He is alert. He displays normal reflexes. GCS eye subscore is 4. GCS verbal subscore is 4. GCS motor subscore is 5. Skin: Skin is warm and dry. No rash noted. He is not diaphoretic. No erythema. No pallor. Psychiatric: He has a normal mood and affect.  His behavior is normal. Judgment and thought content normal.  
Nursing note and vitals reviewed. . 
 
 
Diagnostic Study Results Labs - No results found for this or any previous visit (from the past 12 hour(s)). Radiologic Studies -  
XR HIPS BI W AP PELV Final Result XR HAND RT MIN 3 V Final Result CT SPINE CERV WO CONT Final Result CT HEAD WO CONT Final Result CT Results  (Last 48 hours) 10/10/18 1255  CT HEAD WO CONT Final result Impression:  IMPRESSION:  
Sequela of involutional changes and chronic microvascular ischemia. No evidence  
of hemorrhage or fracture. Narrative:  EXAM:  CT HEAD WO CONT INDICATION:   Fall COMPARISON: None. CONTRAST:  None. TECHNIQUE: Unenhanced CT of the head was performed using 5 mm images. Brain and  
bone windows were generated. CT dose reduction was achieved through use of a  
standardized protocol tailored for this examination and automatic exposure  
control for dose modulation. FINDINGS:  
Prominence of the ventricles and sulci indicative of age-related involutional  
changes. Moderate periventricular white matter hypodensities suggesting chronic  
microvascular ischemia. No evidence of hemorrhage. Basal cisterns are patent. No  
evidence of acute infarct. Imaged paranasal sinuses, mastoid air cells, and  
middle ear cavities are clear. Orbits and globes are normal. No subgaleal  
hematoma. 10/10/18 1255  CT SPINE CERV WO CONT Final result Impression:  IMPRESSION:  
No fracture is identified. There are degenerative changes mid to lower cervical  
spine. There are changes of emphysema There is a 7 mm rounded density in the left parapharyngeal space which is  
nonspecific and could be an adenoma or possibly lymph node and follow-up is  
recommended as is clinically indicated. Zak Mujica Narrative:  EXAM:  CT CERVICAL SPINE WITHOUT CONTRAST INDICATION:   fall. Dementia, fell out of wheelchair hitting head COMPARISON: None. CONTRAST:  None. TECHNIQUE: Multislice helical CT of the cervical spine was performed without  
intravenous contrast administration. Sagittal and coronal reconstructions were  
generated. CT dose reduction was achieved through use of a standardized  
protocol tailored for this examination and automatic exposure control for dose  
modulation. FINDINGS: There are changes of emphysema. There is a 7 mm nodule in the left parapharyngeal space Vascular calcifications noted common carotid bifurcations. There are mild/moderate changes cervical spondylosis C4-C7 There is disc space narrowing at C3-C4 with slight retrolisthesis of C3 on C4. There is no fracture or subluxation. The odontoid process is intact. The  
craniocervical junction is within normal limits. The prevertebral soft tissues  
are within normal limits. C2-C3:  There is mild broad-based protrusion of the disc with moderate central  
stenosis. Neural foramina. Patent. Esperanza Copper C3-C4:  There is disc space narrowing with osteophyte/disc complex posteriorly  
resulting in moderately severe to severe central stenosis with narrowing of  
neural foramina right greater than left. Esperanza Copper C4-C5:  There is slight diffuse bulging of the disc and facet arthropathy with  
moderate central stenosis and narrowing of neural foramina bilaterally. Esperanza Copper C5-C6:  There are degenerative disc changes with mild central stenosis. Neural  
foramina are patent. Esperanza Copper C6-C7:  There is no spinal canal or neural foraminal stenosis. C7-T1:  There is no spinal canal or neural foraminal stenosis. CXR Results  (Last 48 hours) None Medical Decision Making I am the first provider for this patient. I reviewed the vital signs, available nursing notes, past medical history, past surgical history, family history and social history. Vital Signs-Reviewed the patient's vital signs. Patient Vitals for the past 12 hrs: 
 Temp Pulse Resp BP SpO2  
10/10/18 1201 - - - - 100 % 10/10/18 1200 - - - 127/82 93 % 10/10/18 1156 97.3 °F (36.3 °C) 91 18 158/58 97 % Pulse Oximetry Analysis - 97% on RA Cardiac Monitor:  
Rate: 91 bpm 
 
Records Reviewed: Nursing Notes and Old Medical Records Provider Notes (Medical Decision Making): Fall Laceration Skin tear CT head and neck XR pelvis Wound repair WOUND REPAIR Date/Time: 10/10/2018 12:22 PM 
Performed by: NPPreparation: skin prepped with Betadine Location details: scalp Wound length:2.5 cm or less Anesthesia: local infiltration Anesthesia: 
Local Anesthetic: lidocaine 1% without epinephrine Irrigation solution: saline Debridement: none Skin closure: 5-0 nylon Number of sutures: 3 Technique: simple Approximation: close Dressing: antibiotic ointment and non-adhesive packing strip Patient tolerance: Patient tolerated the procedure well with no immediate complications My total time at bedside, performing this procedure was 1-15 minutes. ED Course:  
Initial assessment performed. The patients presenting problems have been discussed, and they are in agreement with the care plan formulated and outlined with them. I have encouraged them to ask questions as they arise throughout their visit. Disposition: 
Discharge to SNF PLAN: 
1. Discharge Medication List as of 10/10/2018  3:01 PM  
  
 
2. Follow-up Information Follow up With Details Comments Contact Info Your Primary Care Physician  In 2 days [facility] EMERGENCY DEPT  As needed, If symptoms worsen Kaia Venegas [address] [facility] 
[phone] Return to ED if worse Diagnosis Clinical Impression: 1. Fall, initial encounter 2. Closed head injury, initial encounter 3. Laceration of scalp, initial encounter 4. Skin tear of right hand without complication, initial encounter Attestations: 
 
Carlton Clay NP 
4:56 PM

## 2018-10-10 NOTE — PROGRESS NOTES
CM consulted re: insurance information. Pt presented to the ED from [facility]. Nursing staff stated pt is listed as a self pay, but according to [facility] pt has Medicaid, but is unable to provide Medicaid number. CM called Tia with admissions with [facility] re: Medicaid number. Per Tia, pt does not have Medicaid, but has insurance through the [place]. CM informed nursing staff of above information. No further questions or needs identified at this time. CM will continue to remain available for support, discharge planning as needed. Quentin Felty, MSW [facility] [phone]

## 2018-10-10 NOTE — DISCHARGE INSTRUCTIONS
Preventing Falls: Care Instructions  Your Care Instructions    Getting around your home safely can be a challenge if you have injuries or health problems that make it easy for you to fall. Loose rugs and furniture in walkways are among the dangers for many older people who have problems walking or who have poor eyesight. People who have conditions such as arthritis, osteoporosis, or dementia also have to be careful not to fall. You can make your home safer with a few simple measures. Follow-up care is a key part of your treatment and safety. Be sure to make and go to all appointments, and call your doctor if you are having problems. It's also a good idea to know your test results and keep a list of the medicines you take. How can you care for yourself at home? Taking care of yourself  · You may get dizzy if you do not drink enough water. To prevent dehydration, drink plenty of fluids, enough so that your urine is light yellow or clear like water. Choose water and other caffeine-free clear liquids. If you have kidney, heart, or liver disease and have to limit fluids, talk with your doctor before you increase the amount of fluids you drink. · Exercise regularly to improve your strength, muscle tone, and balance. Walk if you can. Swimming may be a good choice if you cannot walk easily. · Have your vision and hearing checked each year or any time you notice a change. If you have trouble seeing and hearing, you might not be able to avoid objects and could lose your balance. · Know the side effects of the medicines you take. Ask your doctor or pharmacist whether the medicines you take can affect your balance. Sleeping pills or sedatives can affect your balance. · Limit the amount of alcohol you drink. Alcohol can impair your balance and other senses. · Ask your doctor whether calluses or corns on your feet need to be removed.  If you wear loose-fitting shoes because of calluses or corns, you can lose your balance and fall. · Talk to your doctor if you have numbness in your feet. Preventing falls at home  · Remove raised doorway thresholds, throw rugs, and clutter. Repair loose carpet or raised areas in the floor. · Move furniture and electrical cords to keep them out of walking paths. · Use nonskid floor wax, and wipe up spills right away, especially on ceramic tile floors. · If you use a walker or cane, put rubber tips on it. If you use crutches, clean the bottoms of them regularly with an abrasive pad, such as steel wool. · Keep your house well lit, especially [place], and outside walkways. Use night-lights in areas such as hallways and bathrooms. Add extra light switches or use remote switches (such as switches that go on or off when you clap your hands) to make it easier to turn lights on if you have to get up during the night. · Install sturdy handrails on stairways. · Move items in your cabinets so that the things you use a lot are on the lower shelves (about waist level). · Keep a cordless phone and a flashlight with new batteries by your bed. If possible, put a phone in each of the main rooms of your house, or carry a cell phone in case you fall and cannot reach a phone. Or, you can wear a device around your neck or wrist. You push a button that sends a signal for help. · Wear low-heeled shoes that fit well and give your feet good support. Use footwear with nonskid soles. Check the heels and soles of your shoes for wear. Repair or replace worn heels or soles. · Do not wear socks without shoes on wood floors. · Walk on the grass when the sidewalks are slippery. If you live in an area that gets snow and ice in the winter, sprinkle salt on slippery steps and sidewalks. Preventing falls in the bath  · Install grab bars and nonskid mats inside and outside your shower or tub and near the toilet and sinks. · Use shower chairs and bath benches.   · Use a hand-held shower head that will allow you to sit while showering. · Get into a tub or shower by putting the weaker leg in first. Get out of a tub or shower with your strong side first.  · Repair loose toilet seats and consider installing a raised toilet seat to make getting on and off the toilet easier. · Keep your bathroom door unlocked while you are in the shower. Where can you learn more? Go to http://berry-akira.info/. Enter [phone] [id number] [phone] in the search box to learn more about \"Preventing Falls: Care Instructions. \"  Current as of: March 16, 2018  Content Version: 11.8  © 9355-9986 [employer]. Care instructions adapted under license by [employer] (which disclaims liability or warranty for this information). If you have questions about a medical condition or this instruction, always ask your healthcare professional. [place] [address] any warranty or liability for your use of this information. We hope that we have addressed all of your medical concerns. The examination and treatment you received in the Emergency Department were for an emergent problem and were not intended as complete care. It is important that you follow up with your healthcare provider(s) for ongoing care. If your symptoms worsen or do not improve as expected, and you are unable to reach your usual health care provider(s), you should return to the Emergency Department. [facility] participate in nationally recognized quality of care measures. If your blood pressure is greater than 120/80, as reported below, we urge that you seek medical care to address the potential of high blood pressure, commonly known as hypertension. Hypertension can be hereditary or can be caused by certain medical conditions, pain, stress, or \"white coat syndrome. \"       Please make an appointment with your health care provider(s) for follow up of your Emergency Department visit.        VITALS:   Patient Vitals for the past 8 hrs:   Temp Pulse Resp BP SpO2   10/10/18 1201 - - - - 100 %   10/10/18 1200 - - - 127/82 93 %   10/10/18 1156 97.3 °F (36.3 °C) 91 18 158/58 97 %          Thank you for allowing us to provide you with medical care today. We realize that you have many choices for your emergency care needs. Please choose us in the future for any continued health care needs. [facility] Oren Greer NP              No results found for this or any previous visit (from the past 24 hour(s)). Xr Hand Rt Min 3 V    Result Date: 10/10/2018  EXAM:  XR HAND RT MIN 3 V INDICATION:  wound. Right hand laceration. COMPARISON: None. FINDINGS: Three views of the right hand demonstrate no fracture or other acute osseous or articular abnormality. There is a soft tissue laceration over the dorsum of the hand. IMPRESSION:  No acute osseous or articular abnormality. Soft tissue laceration over the dorsum of the hand. Ct Head Wo Cont    Result Date: 10/10/2018  EXAM:  CT HEAD WO CONT INDICATION:   Fall COMPARISON: None. CONTRAST:  None. TECHNIQUE: Unenhanced CT of the head was performed using 5 mm images. Brain and bone windows were generated. CT dose reduction was achieved through use of a standardized protocol tailored for this examination and automatic exposure control for dose modulation. FINDINGS: Prominence of the ventricles and sulci indicative of age-related involutional changes. Moderate periventricular white matter hypodensities suggesting chronic microvascular ischemia. No evidence of hemorrhage. Basal cisterns are patent. No evidence of acute infarct. Imaged paranasal sinuses, mastoid air cells, and middle ear cavities are clear. Orbits and globes are normal. No subgaleal hematoma. IMPRESSION: Sequela of involutional changes and chronic microvascular ischemia. No evidence of hemorrhage or fracture.      Ct Spine Cerv Wo Cont    Result Date: 10/10/2018  EXAM:  CT CERVICAL SPINE WITHOUT CONTRAST INDICATION:   fall. Dementia, fell out of wheelchair hitting head COMPARISON: None. CONTRAST:  None. TECHNIQUE: Multislice helical CT of the cervical spine was performed without intravenous contrast administration. Sagittal and coronal reconstructions were generated. CT dose reduction was achieved through use of a standardized protocol tailored for this examination and automatic exposure control for dose modulation. FINDINGS: There are changes of emphysema. There is a 7 mm nodule in the left parapharyngeal space Vascular calcifications noted common carotid bifurcations. There are mild/moderate changes cervical spondylosis C4-C7 There is disc space narrowing at C3-C4 with slight retrolisthesis of C3 on C4. There is no fracture or subluxation. The odontoid process is intact. The craniocervical junction is within normal limits. The prevertebral soft tissues are within normal limits. C2-C3:  There is mild broad-based protrusion of the disc with moderate central stenosis. Neural foramina. Patent. . C3-C4:  There is disc space narrowing with osteophyte/disc complex posteriorly resulting in moderately severe to severe central stenosis with narrowing of neural foramina right greater than left. Percell Brakeman C4-C5:  There is slight diffuse bulging of the disc and facet arthropathy with moderate central stenosis and narrowing of neural foramina bilaterally. Percell Brakeman C5-C6:  There are degenerative disc changes with mild central stenosis. Neural foramina are patent. Percell Brakeman C6-C7:  There is no spinal canal or neural foraminal stenosis. C7-T1:  There is no spinal canal or neural foraminal stenosis. IMPRESSION: No fracture is identified. There are degenerative changes mid to lower cervical spine. There are changes of emphysema There is a 7 mm rounded density in the left parapharyngeal space which is nonspecific and could be an adenoma or possibly lymph node and follow-up is recommended as is clinically indicated. .    Xr Hips Bi W Ap Pelv    Result Date: 10/10/2018  EXAM:  XR HIPS BI W AP PELV INDICATION:   fall. Trauma hip pain COMPARISON: None. FINDINGS: An AP view of the pelvis and frogleg lateral views of both hips demonstrate no fracture or dislocation. There are slight degenerative changes. Vascular calcification is noted. .  No acute fracture is identified. IMPRESSION:  No acute fracture identified. ..

## 2018-10-10 NOTE — ED NOTES
Bedside and Verbal shift change report given to Dandy Grant RN (oncoming nurse) by Brissa Vyas RN (offgoing nurse). Report included the following information SBAR, ED Summary, MAR and Recent Results.

## 2018-10-10 NOTE — ED NOTES
[facility] called and report given to [facility] using [employer].

## 2018-10-10 NOTE — ED NOTES
Pt presents to ED via EMS after fall this morning[facility] [address] [facility] [address] [facility] this morning hitting his head. EMS denies LOC. Laceration noted to L forehead and skin tear to R hand. Per EMS pt with hx of dementia and pt is at baseline mentation. Pt placed on cardiac monitor x3. VSS.

## 2018-10-18 PROBLEM — J18.9 PNA (PNEUMONIA): Status: ACTIVE | Noted: 2018-01-01

## 2018-10-18 NOTE — PROGRESS NOTES
Pharmacy Automatic Renal Dosing Protocol - Antimicrobials Indication for Antimicrobials: CAP 
PMH:  COPD Current Regimen of Each Antimicrobial: 
Zosyn 3.375 gm IV q8h - 10/18 thru 10/23 Labs: 
Recent Labs 10/18/18 
0209 CREA 0.70 BUN 11 WBC 9.3 Temp (24hrs), Av.8 °F (37.1 °C), Min:98.3 °F (36.8 °C), Max:99.1 °F (37.3 °C) Creatinine Clearance (mL/min) or Dialysis:  
Estimated Creatinine Clearance: 90.8 mL/min (based on SCr of 0.7 mg/dL). Estimated Creatinine Clearance (using IBW):90.8 mL/min Impression/Plan: · Chronic mike cath · Admitted from Nursing home w/ dark emesis x2 and upper abd pain and more altered than baseline, no diarrhea · Discussed w/ Dr. Ilan Castanon re:  continue Vanc + Zosyn and will not provide higher dose for Pseudomonal coverage. · Vancomycin loading dose of 2000mg IV given 10/18 4am.  Continue Vancomycin maintenance dose of 1500mg IV q12h for a projected trough at Css of 16.6. · Continue Zosyn 3.375gm IV q8h. Zosyn 4.5gm was provided as an initial dose. · BMP + CBC daily already ordered · Antimicrobial stop date Pharmacy will follow daily and adjust medications as appropriate for renal function and/or serum levels.  
 
Thank you, 
Fernando Amaya, [facility]

## 2018-10-18 NOTE — ED PROVIDER NOTES
EMERGENCY DEPARTMENT HISTORY AND PHYSICAL EXAM 
 
 
Date: 10/18/2018 Patient Name: Imani Merino History of Presenting Illness Chief Complaint Patient presents with  Vomiting  Altered mental status History Provided By: EMS 
 
HPI: Imani Merino, 82 y.o. male with PMHx significant for dementia, COPD, HTN, CA, presents via EMS to the ED for evaluation of upper abd pain x a few days. Per EMS, pt had two episodes of dark emesis PTA, which prompted his nursing home to call them. EMS also states pt is more altered than baseline. They deny diarrhea. Papers sent with nursing home show pt is a full code. History of present illness is limited due to pt baseline dementia. PCP: Armida, MD [facility] 
 
Current Facility-Administered Medications Medication Dose Route Frequency Provider Last Rate Last Dose  sodium chloride (NS) flush 5-10 mL  5-10 mL IntraVENous PRN Gordon Keller MD      
 piperacillin-tazobactam (ZOSYN) 4.5 g in 0.9% sodium chloride (MBP/ADV) 100 mL ADV  4.5 g IntraVENous NOW Gordon Keller MD   4.5 g at 10/18/18 0419  
 vancomycin (VANCOCIN) 2000 mg in  ml infusion  2,000 mg IntraVENous ONCE Gordon Keller  mL/hr at 10/18/18 0421 2,000 mg at 10/18/18 0421 Current Outpatient Medications Medication Sig Dispense Refill  levothyroxine (SYNTHROID) 112 mcg tablet Take 112 mcg by mouth Daily (before breakfast).  aspirin delayed-release 81 mg tablet Take 81 mg by mouth daily.  sertraline (ZOLOFT) 50 mg tablet Take 50 mg by mouth daily.  cyanocobalamin (VITAMIN B-12) 1,000 mcg tablet Take 1,000 mcg by mouth daily.  ascorbic acid, vitamin C, (VITAMIN C) 1,000 mg tablet Take 1,000 mg by mouth daily.  atorvastatin (LIPITOR) 20 mg tablet Take 20 mg by mouth daily. Past History Past Medical History: 
Past Medical History:  
Diagnosis Date  Cancer ([facility] [address]  Chronic obstructive pulmonary disease ([facility] [address]  Dementia  Hypertension Past Surgical History: No past surgical history on file. Family History: No family history on file. Social History: 
Social History Tobacco Use  Smoking status: Former Smoker  Smokeless tobacco: Never Used Substance Use Topics  Alcohol use: No  
  Frequency: Never  Drug use: No  
 
 
Allergies: 
No Known Allergies Review of Systems Review of Systems Unable to perform ROS: Dementia Physical Exam  
Physical Exam  
Constitutional:  
Ill appearing, elderly male in moderate distress, mumbling. Withdraws to pain. HENT:  
Head: Normocephalic and atraumatic. Mouth/Throat: Oropharynx is clear and moist. Mucous membranes are dry. Eyes: Conjunctivae are normal. Right eye exhibits no discharge. Left eye exhibits no discharge. Pupils equal  
Neck: Normal range of motion. Neck supple. Cardiovascular: Normal rate, regular rhythm and normal heart sounds. No murmur heard. Pulmonary/Chest: Effort normal and breath sounds normal. No respiratory distress. He has no wheezes. He has no rales. Abdominal: Soft. Bowel sounds are normal. He exhibits no distension. There is tenderness (upper). Genitourinary:  
Genitourinary Comments: Catheter in place draining turbid fluid with chunks Musculoskeletal: Normal range of motion. He exhibits no edema. Skin: Skin is warm and dry. He is not diaphoretic. Multiple small abrasions to bilateral LE - shins, toes and feet. Nursing note and vitals reviewed. Diagnostic Study Results Labs - Recent Results (from the past 12 hour(s)) EKG, 12 LEAD, INITIAL Collection Time: 10/18/18  1:56 AM  
Result Value Ref Range Ventricular Rate 101 BPM  
 Atrial Rate 101 BPM  
 P-R Interval 128 ms QRS Duration 76 ms  
 Q-T Interval 342 ms QTC Calculation (Bezet) 443 ms Calculated P Axis 77 degrees Calculated R Axis 73 degrees Calculated T Axis 68 degrees Diagnosis Sinus tachycardia Nonspecific ST abnormality No previous ECGs available LACTIC ACID Collection Time: 10/18/18  2:09 AM  
Result Value Ref Range Lactic acid 1.2 0.4 - 2.0 MMOL/L  
URINALYSIS W/ RFLX MICROSCOPIC Collection Time: 10/18/18  2:09 AM  
Result Value Ref Range Color YELLOW/STRAW Appearance TURBID (A) CLEAR Specific gravity 1.017 1.003 - 1.030    
 pH (UA) 7.5 5.0 - 8.0 Protein 30 (A) NEG mg/dL Glucose NEGATIVE  NEG mg/dL Ketone NEGATIVE  NEG mg/dL Bilirubin NEGATIVE  NEG Blood MODERATE (A) NEG Urobilinogen 1.0 0.2 - 1.0 EU/dL Nitrites POSITIVE (A) NEG Leukocyte Esterase LARGE (A) NEG    
 WBC >100 (H) 0 - 4 /hpf  
 RBC 10-20 0 - 5 /hpf Epithelial cells FEW FEW /lpf Bacteria 4+ (A) NEG /hpf METABOLIC PANEL, COMPREHENSIVE Collection Time: 10/18/18  2:09 AM  
Result Value Ref Range Sodium 133 (L) 136 - 145 mmol/L Potassium 3.5 3.5 - 5.1 mmol/L Chloride 100 97 - 108 mmol/L  
 CO2 24 21 - 32 mmol/L Anion gap 9 5 - 15 mmol/L Glucose 110 (H) 65 - 100 mg/dL BUN 11 6 - 20 MG/DL Creatinine 0.70 0.70 - 1.30 MG/DL  
 BUN/Creatinine ratio 16 12 - 20 GFR est AA >60 >60 ml/min/1.73m2 GFR est non-AA >60 >60 ml/min/1.73m2 Calcium 8.7 8.5 - 10.1 MG/DL Bilirubin, total 0.7 0.2 - 1.0 MG/DL  
 ALT (SGPT) 28 12 - 78 U/L  
 AST (SGOT) 37 15 - 37 U/L Alk. phosphatase 88 45 - 117 U/L Protein, total 7.3 6.4 - 8.2 g/dL Albumin 2.6 (L) 3.5 - 5.0 g/dL Globulin 4.7 (H) 2.0 - 4.0 g/dL A-G Ratio 0.6 (L) 1.1 - 2.2    
CBC WITH AUTOMATED DIFF Collection Time: 10/18/18  2:09 AM  
Result Value Ref Range WBC 9.3 4.1 - 11.1 K/uL  
 RBC 4.07 (L) 4.10 - 5.70 M/uL  
 HGB 12.8 12.1 - 17.0 g/dL HCT 36.6 36.6 - 50.3 % MCV 89.9 80.0 - 99.0 FL  
 MCH 31.4 26.0 - 34.0 PG  
 MCHC 35.0 30.0 - 36.5 g/dL  
 RDW 13.3 11.5 - 14.5 % PLATELET 293 473 - 425 K/uL MPV 10.8 8.9 - 12.9 FL  
 NRBC 0.0 0  WBC ABSOLUTE NRBC 0.00 0.00 - 0.01 K/uL NEUTROPHILS 82 (H) 32 - 75 % LYMPHOCYTES 9 (L) 12 - 49 % MONOCYTES 8 5 - 13 % EOSINOPHILS 1 0 - 7 % BASOPHILS 0 0 - 1 % IMMATURE GRANULOCYTES 1 (H) 0.0 - 0.5 % ABS. NEUTROPHILS 7.6 1.8 - 8.0 K/UL  
 ABS. LYMPHOCYTES 0.8 0.8 - 3.5 K/UL  
 ABS. MONOCYTES 0.7 0.0 - 1.0 K/UL  
 ABS. EOSINOPHILS 0.1 0.0 - 0.4 K/UL  
 ABS. BASOPHILS 0.0 0.0 - 0.1 K/UL  
 ABS. IMM. GRANS. 0.1 (H) 0.00 - 0.04 K/UL  
 DF AUTOMATED Radiologic Studies -  
CT ABD PELV W CONT Final Result XR CHEST PORT Final Result CT Results  (Last 48 hours) 10/18/18 0326  CT ABD PELV W CONT Final result Impression:  IMPRESSION:  
COPD, right lung base infiltrate. Abdominal aortic aneurysm. Fecal stasis. Bladder wall thickening with prostate hypertrophy. Narrative:  EXAM:  CT ABD PELV W CONT INDICATION: Abdominal pain; AP, vomiting  hematuria COMPARISON: None CONTRAST:  100 mL of Isovue-370. TECHNIQUE:   
Following the uneventful intravenous administration of contrast, thin axial  
images were obtained through the abdomen and pelvis. Coronal and sagittal  
reconstructions were generated. Oral contrast was not administered. CT dose  
reduction was achieved through use of a standardized protocol tailored for this  
examination and automatic exposure control for dose modulation. FINDINGS:   
LUNG BASES: COPD, right lung base infiltrate. INCIDENTALLY IMAGED HEART AND MEDIASTINUM: Unremarkable. LIVER: No mass or biliary dilatation. GALLBLADDER: Unremarkable. SPLEEN: No mass. PANCREAS: No mass or ductal dilatation. ADRENALS: Unremarkable. KIDNEYS: No mass, calculus, or hydronephrosis. STOMACH: Unremarkable. SMALL BOWEL: No dilatation or wall thickening. COLON: No dilatation or wall thickening. Sigmoid diverticulosis, prominent fecal  
stasis rectosigmoid. APPENDIX: Unremarkable. PERITONEUM: No ascites or pneumoperitoneum. RETROPERITONEUM: No lymphadenopathy 4.4 cm infrarenal abdominal aortic aneurysm  
with peripheral clot. REPRODUCTIVE ORGANS:  
URINARY BLADDER: Mendoza catheter in place, diffuse wall thickening, prostate  
enlargement. BONES: No destructive bone lesion. ADDITIONAL COMMENTS: N/A  
   
  
  
 
CXR Results  (Last 48 hours) 10/18/18 0226  XR CHEST PORT Final result Impression:  impression: Interstitial disease possibly chronic. Narrative:  Clinical indication: Shortness of breath. Portable AP semiupright view of the chest is obtained, no prior. The heart size  
is normal. There is no focal consolidation. Diffuse increased interstitial  
markings possibly chronic. Medical Decision Making I am the first provider for this patient. I reviewed the vital signs, available nursing notes, past medical history, past surgical history, family history and social history. Vital Signs-Reviewed the patient's vital signs. Patient Vitals for the past 12 hrs: 
 Temp Pulse Resp BP SpO2  
10/18/18 0300  92 18 170/84 94 % 10/18/18 0245  88 17 154/75 92 % 10/18/18 0215  (!) 101 16 151/85 92 % 10/18/18 0204 99.1 °F (37.3 °C) (!) 102 21 (!) 167/121 96 % Pulse Oximetry Analysis - 97% on RA Cardiac Monitor:  
Rate: 101 bpm 
Rhythm: Sinus Tachycardia EKG interpretation: (Preliminary) 2733 Rhythm: sinus tachycardia; and regular . Rate (approx.): 101; Axis: normal; HI interval: normal; QRS interval: normal ; ST/T wave: Nonspecific ST abnormality. Written by SAMANTHA Valencia, as dictated by Lucero Brown MD. Records Reviewed: Nursing Notes, Old Medical Records, Previous electrocardiograms, Ambulance Run Sheet, Previous Radiology Studies and Previous Laboratory Studies Provider Notes (Medical Decision Making):  
Nonverbal pt with questionable hx of dementia, presents appearing ill with probable sepsis, abd exam concerning with tenderness. Currently hemodynamically stable, without further episodes of vomiting, catheter as possible source and needs to be changed. Abdominal exam concerning for acute infection; monitor closely. ED Course:  
Initial assessment performed. The patients presenting problems have been discussed, and they are in agreement with the care plan formulated and outlined with them. I have encouraged them to ask questions as they arise throughout their visit. 02:55 Patient improving with decreased HR; SBP remains stable. 4:10 AM 
Spoke with radiology. States pt CT is not concerning for dissection and the peripheral clot is normal. Notes pt does not need a CTA at this time. Written by SAMANTHA Collins, as dictated by Mechelle Leonard MD. 
 
CONSULT NOTE:  
4:12 AM 
Mechelle Leonard MD spoke with Dr. Burns Congress, Specialty: Hospitalist 
Discussed pt's hx, disposition, and available diagnostic and imaging results. Reviewed care plans. Consultant will evaluate pt for admission. Written by SAMANTHA Collins, as dictated by Mechelle Leonard MD. Critical Care Time:  
none Disposition: 
4:30 AM 
Patient is being admitted to the hospital. The results of their tests and reasons for their admission have been discussed with them and/or available family. They convey agreement and understanding for the need to be admitted and for their admission diagnosis. Consultation has been made with the inpatient physician specialist for hospitalization. PLAN: 
1. Admit to hospitalist.  
 
Return to ED if worse Diagnosis Clinical Impression: 1. Nausea and vomiting, intractability of vomiting not specified, unspecified vomiting type 2. Delirium 3. Urinary tract infection associated with indwelling urethral catheter, initial encounter ([facility] [address] 4. Pneumonia of right lung due to infectious organism, unspecified part of lung Attestations: This note is prepared by Johny Stevens, acting as Scribe for Selvin Tolliver MD. Selvin Tolliver MD: The scribe's documentation has been prepared under my direction and personally reviewed by me in its entirety. I confirm that the note above accurately reflects all work, treatment, procedures, and medical decision making performed by me.

## 2018-10-18 NOTE — PROGRESS NOTES
TRANSFER - IN REPORT: 
 
Verbal report received from Jose Martin(name) on Eloy Rule  being received from ER(unit) for routine progression of care Report consisted of patients Situation, Background, Assessment and  
Recommendations(SBAR). Information from the following report(s) SBAR, Kardex, [facility] and Recent Results was reviewed with the receiving nurse. Opportunity for questions and clarification was provided. Assessment completed upon patients arrival to unit and care assumed. Primary Nurse Lalo Albright RN and Sasha Ansari RN performed a dual skin assessment on this patient No impairment noted Bill score is 16

## 2018-10-18 NOTE — H&P
Hospitalist Admission NoteNAME: John Kent :  1935 MRN:  [id number] Date/Time:  10/18/2018 5:49 AM 
 
Patient PCP: Jennifer Poe MD 
______________________________________________________________________ Given the patient's current clinical presentation, I have a high level of concern for decompensation if discharged from the emergency department. Complex decision making was performed, which includes reviewing the patient's available past medical records, laboratory results, and x-ray films. My assessment of this patient's clinical condition and my plan of care is as follows. Assessment / Plan: UTI secondary to chronic Mendoza catheter Admit to telemetry unit he Start patient on broad-spectrum antibiotics Zosyn Follow-up urine culture and follow-up blood culture Right lower lobe pneumonia Start patient on broad-spectrum antibiotic Vanco and Zosyn Follow-up sputum culture follow blood culture follow-up urine culture Hypothyroidism Continue Synthyroid 112 mcg daily Hyperlipidemia Continue Lipitor 20 mg daily Change mental status most likely secondary to metabolic encephalopathy  
-follow-up head CT scan COPD Continue DuoNeb nebulizer as needed Hypertension Start patient on antihypertensive medication hydralazine as needed Code Status: full code Surrogate Decision Maker: 
Tyron Bradydeisy Child [phone] DVT Prophylaxis: Lovenox GI Prophylaxis: not indicated Subjective: CHIEF COMPLAINT: Right upper abdomen pain HISTORY OF PRESENT ILLNESS:    
82years old male from NH  with past medical history significant for dementia COPD hyper tension presented to the ED for evaluation of right upper abdominal pain associated with vomiting associated with change mental status patient very his poor historian and most history was obtained from the transfer paper, workup in the ER was significant for positive UA, abdominal CT scan was done on show bladder wall thickening on the right lung base infiltrate. We were asked to admit for work up and evaluation of the above problems. Past Medical History:  
Diagnosis Date  Cancer ([facility] [address]  Chronic obstructive pulmonary disease ([facility] [address]  Dementia  Hypertension No past surgical history on file. Social History Tobacco Use  Smoking status: Former Smoker  Smokeless tobacco: Never Used Substance Use Topics  Alcohol use: No  
  Frequency: Never No family history on file. No Known Allergies Prior to Admission medications Medication Sig Start Date End Date Taking? Authorizing Provider  
levothyroxine (SYNTHROID) 112 mcg tablet Take 112 mcg by mouth Daily (before breakfast). Yes Other, MD [facility]  
aspirin delayed-release 81 mg tablet Take 81 mg by mouth daily. Yes Armida, MD [facility]  
sertraline (ZOLOFT) 50 mg tablet Take 50 mg by mouth daily. Yes Armida, MD [facility]  
cyanocobalamin (VITAMIN B-12) 1,000 mcg tablet Take 1,000 mcg by mouth daily. Yes Armida, MD [facility]  
ascorbic acid, vitamin C, (VITAMIN C) 1,000 mg tablet Take 1,000 mg by mouth daily. Yes Armida, MD [facility]  
atorvastatin (LIPITOR) 20 mg tablet Take 20 mg by mouth daily. Yes Armida, MD [facility]  
 
 
REVIEW OF SYSTEMS:    
I am not able to complete the review of systems because: The patient is intubated and sedated  
y The patient has altered mental status due to his acute medical problems The patient has baseline aphasia from prior stroke(s) The patient has baseline dementia and is not reliable historian The patient is in acute medical distress and unable to provide information Total of 12 systems reviewed as follows:   
   POSITIVE= underlined text  Negative = text not underlined General:  fever, chills, sweats, generalized weakness, weight loss/gain,  
   loss of appetite Eyes:    blurred vision, eye pain, loss of vision, double vision ENT:    rhinorrhea, pharyngitis Respiratory:   cough, sputum production, SOB, WEBSTER, wheezing, pleuritic pain  
Cardiology:   chest pain, palpitations, orthopnea, PND, edema, syncope Gastrointestinal:  abdominal pain , N/V, diarrhea, dysphagia, constipation, bleeding Genitourinary:  frequency, urgency, dysuria, hematuria, incontinence Muskuloskeletal :  arthralgia, myalgia, back pain Hematology:  easy bruising, nose or gum bleeding, lymphadenopathy Dermatological: rash, ulceration, pruritis, color change / jaundice Endocrine:   hot flashes or polydipsia Neurological:  headache, dizziness, confusion, focal weakness, paresthesia, Speech difficulties, memory loss, gait difficulty Psychological: Feelings of anxiety, depression, agitation Objective: VITALS:   
Visit Vitals /86 Pulse 89 Temp 99.1 °F (37.3 °C) Resp 14 Ht 6' (1.829 m) Wt 81 kg (178 lb 9.2 oz) SpO2 90% BMI 24.22 kg/m² PHYSICAL EXAM: 
 
General:    Lethargic HEENT: Atraumatic, anicteric sclerae, pink conjunctivae No oral ulcers, mucosa moist, throat clear, dentition fair Neck:  Supple, symmetrical,  thyroid: non tender Lungs:   Clear to auscultation bilaterally. No Wheezing or Rhonchi. No rales. Chest wall:  No tenderness  No Accessory muscle use. Heart:   Regular  rhythm,  No  murmur   No edema Abdomen:   Soft, non-tender. Not distended. Bowel sounds normal 
Extremities: No cyanosis. No clubbing,   
  Skin turgor normal, Capillary refill normal, Radial dial pulse 2+ Skin:     Not pale. Not Jaundiced  No rashes Neurologic      Alert , responsive to verbal stimuli : _______________________________________________________________________ Care Plan discussed with: 
  Comments Patient Family RN y   
Care Manager Consultant:     
_______________________________________________________________________ Expected  Disposition:  
Home with Family HH/PT/OT/RN   
SNF/LTC y  
Mihaela   
________________________________________________________________________ TOTAL TIME: 70  Minutes Critical Care Provided     Minutes non procedure based Comments  
 y Reviewed previous records  
>50% of visit spent in counseling and coordination of care y Discussion with patient and/or family and questions answered 
  
 
________________________________________________________________________ Signed: Suni Beltrán MD 
 
Procedures: see electronic medical records for all procedures/Xrays and details which were not copied into this note but were reviewed prior to creation of Plan. LAB DATA REVIEWED:   
Recent Results (from the past 24 hour(s)) EKG, 12 LEAD, INITIAL Collection Time: 10/18/18  1:56 AM  
Result Value Ref Range Ventricular Rate 101 BPM  
 Atrial Rate 101 BPM  
 P-R Interval 128 ms QRS Duration 76 ms  
 Q-T Interval 342 ms QTC Calculation (Bezet) 443 ms Calculated P Axis 77 degrees Calculated R Axis 73 degrees Calculated T Axis 68 degrees Diagnosis Sinus tachycardia Nonspecific ST abnormality No previous ECGs available LACTIC ACID Collection Time: 10/18/18  2:09 AM  
Result Value Ref Range Lactic acid 1.2 0.4 - 2.0 MMOL/L  
URINALYSIS W/ RFLX MICROSCOPIC Collection Time: 10/18/18  2:09 AM  
Result Value Ref Range Color YELLOW/STRAW Appearance TURBID (A) CLEAR Specific gravity 1.017 1.003 - 1.030    
 pH (UA) 7.5 5.0 - 8.0 Protein 30 (A) NEG mg/dL Glucose NEGATIVE  NEG mg/dL Ketone NEGATIVE  NEG mg/dL Bilirubin NEGATIVE  NEG Blood MODERATE (A) NEG Urobilinogen 1.0 0.2 - 1.0 EU/dL Nitrites POSITIVE (A) NEG Leukocyte Esterase LARGE (A) NEG    
 WBC >100 (H) 0 - 4 /hpf  
 RBC 10-20 0 - 5 /hpf Epithelial cells FEW FEW /lpf Bacteria 4+ (A) NEG /hpf METABOLIC PANEL, COMPREHENSIVE  Collection Time: 10/18/18  2:09 AM  
 Result Value Ref Range Sodium 133 (L) 136 - 145 mmol/L Potassium 3.5 3.5 - 5.1 mmol/L Chloride 100 97 - 108 mmol/L  
 CO2 24 21 - 32 mmol/L Anion gap 9 5 - 15 mmol/L Glucose 110 (H) 65 - 100 mg/dL BUN 11 6 - 20 MG/DL Creatinine 0.70 0.70 - 1.30 MG/DL  
 BUN/Creatinine ratio 16 12 - 20 GFR est AA >60 >60 ml/min/1.73m2 GFR est non-AA >60 >60 ml/min/1.73m2 Calcium 8.7 8.5 - 10.1 MG/DL Bilirubin, total 0.7 0.2 - 1.0 MG/DL  
 ALT (SGPT) 28 12 - 78 U/L  
 AST (SGOT) 37 15 - 37 U/L Alk. phosphatase 88 45 - 117 U/L Protein, total 7.3 6.4 - 8.2 g/dL Albumin 2.6 (L) 3.5 - 5.0 g/dL Globulin 4.7 (H) 2.0 - 4.0 g/dL A-G Ratio 0.6 (L) 1.1 - 2.2    
CBC WITH AUTOMATED DIFF Collection Time: 10/18/18  2:09 AM  
Result Value Ref Range WBC 9.3 4.1 - 11.1 K/uL  
 RBC 4.07 (L) 4.10 - 5.70 M/uL  
 HGB 12.8 12.1 - 17.0 g/dL HCT 36.6 36.6 - 50.3 % MCV 89.9 80.0 - 99.0 FL  
 MCH 31.4 26.0 - 34.0 PG  
 MCHC 35.0 30.0 - 36.5 g/dL  
 RDW 13.3 11.5 - 14.5 % PLATELET 743 458 - 736 K/uL MPV 10.8 8.9 - 12.9 FL  
 NRBC 0.0 0  WBC ABSOLUTE NRBC 0.00 0.00 - 0.01 K/uL NEUTROPHILS 82 (H) 32 - 75 % LYMPHOCYTES 9 (L) 12 - 49 % MONOCYTES 8 5 - 13 % EOSINOPHILS 1 0 - 7 % BASOPHILS 0 0 - 1 % IMMATURE GRANULOCYTES 1 (H) 0.0 - 0.5 % ABS. NEUTROPHILS 7.6 1.8 - 8.0 K/UL  
 ABS. LYMPHOCYTES 0.8 0.8 - 3.5 K/UL  
 ABS. MONOCYTES 0.7 0.0 - 1.0 K/UL  
 ABS. EOSINOPHILS 0.1 0.0 - 0.4 K/UL  
 ABS. BASOPHILS 0.0 0.0 - 0.1 K/UL  
 ABS. IMM. GRANS. 0.1 (H) 0.00 - 0.04 K/UL  
 DF AUTOMATED

## 2018-10-18 NOTE — PROGRESS NOTES
Initial Nutrition Assessment: 
 
INTERVENTIONS/RECOMMENDATIONS:  
· Diet per SLP 
 
ASSESSMENT:  
Chart reviewed, medically noted for UTI, PNA, dementia and PMH shown below. Nutrition referral triggered due to MST score. Pt sleeping and noted for being a poor historian. No family present. Unable to assess weight loss. Weight history only has one weight documented. MST scoring shown below. Will monitor PO intake once pt is placed on a diet, SLP has been consulted. Recently Lost Weight Without Trying Unsure filed at 10/18/2018 0700 If Yes, How Much Weight Loss Unsure filed at 10/18/2018 0700 Eating Poorly Due to Decreased Appetite No filed at 10/18/2018 0700 MST Score 4 filed at 10/18/2018 0700 Past Medical History:  
Diagnosis Date  Cancer ([facility] [address]  Chronic obstructive pulmonary disease ([facility]  Dementia  Hypertension Diet Order: NPO 
% Eaten:  No data found. Pertinent Medications: [x]Reviewed: NS w/ KCl, Pertinent Labs: [x]Reviewed:  
Food Allergies: [x]NKFA  []Other Last BM:  
Edema:        []RUE   []LUE   []RLE   []LLE Pressure Injury:      [] Stage I   [] Stage II   [] Stage III   [] Stage IV Wt Readings from Last 30 Encounters:  
10/18/18 81 kg (178 lb 9.2 oz) Anthropometrics:  
Height: 6' (182.9 cm) Weight: 81 kg (178 lb 9.2 oz) IBW (%IBW):   ( ) UBW (%UBW):   (  %) Last Weight Metrics: 
Weight Loss Metrics 10/18/2018 Today's Wt 178 lb 9.2 oz  
BMI 24.22 kg/m2 BMI: Body mass index is 24.22 kg/m². This BMI is indicative of: 
 []Underweight    [x]Normal    []Overweight    [] Obesity   [] Extreme Obesity (BMI>40) Estimated Nutrition Needs (Based on):  
4887 Kcals/day(BMR: 1542 x 1.2) , 80 g(1 g/kg) Protein Carbohydrate: At Least 130 g/day  Fluids: 1850 mL/day (1ml/kcal) or per primary team 
 
NUTRITION DIAGNOSES:  
Problem:  No nutritional diagnosis at this time Etiology: related to Signs/Symptoms: as evidenced by     
 
 NUTRITION INTERVENTIONS: 
Meals/Snacks: General/healthful diet GOAL:  
consume >50% of meals in 3-5 days LEARNING NEEDS (Diet, Food/Nutrient-Drug Interaction):  
 [x] None Identified 
 [] Identified and Education Provided/Documented 
 [] Identified and Pt declined/was not appropriate Cultureal, Anglican, OR Ethnic Dietary Needs:  
 [x] None Identified 
 [] Identified and Addressed 
 
 [x] Interdisciplinary Care Plan Reviewed/Documented  
 [x] Discharge Planning:  TBD MONITORING /EVALUATION:  
  
Food/Nutrient Intake Outcomes: Total energy intake Physical Signs/Symptoms Outcomes: Weight/weight change, Electrolyte and renal profile, GI 
 
NUTRITION RISK:  
 [] High              [x] Moderate           []  Low  []  Minimal/Uncompromised PT SEEN FOR:  
 []  MD Consult: []Calorie Count []Diabetic Diet Education []Diet Education []Electrolyte Management []General Nutrition Management and Supplements []Management of Tube Feeding []TPN Recommendations [x]  RN Referral:  [x]MST score >=2 
   []Enteral/Parenteral Nutrition PTA []Pregnant: Gestational DM or Multigestation 
   []Pressure Ulcer/Wound Care needs 
     
[]  Low BMI 
[]  LOS Referral  
 
 
Toby Connell RDN Pager [phone] Weekend Pager [phone]

## 2018-10-18 NOTE — PROGRESS NOTES
Problem: Dysphagia (Adult) Goal: *Acute Goals and Plan of Care (Insert Text) Speech pathology goals Initiated 10/18/2018 1. Patient will participate in re-evaluation of swallow function within 7 days Speech LAnguage Pathology bedside swallow evaluation Patient: John Kent [de-identified]82 y.o. male) Date: 10/18/2018 Primary Diagnosis: PNA (pneumonia) Precautions: swallow ASSESSMENT : 
Patient received asleep in bed, however he awoke to verbal/tactile stimulation. Patient oriented to person only, and no command following noted. Patient with difficulty maintaining alertness without constant stimulation. Based on the objective data described below, the patient presents with severe oral dysphagia and moderate pharyngeal dysphagia. Oral dysphagia is characterized by mostly absent bolus acceptance of purees via spoon and thin liquids via straw. Patient accepted thin liquid sip x1, and patient with slow oral prep, suspected premature spillage, delayed posterior propulsion, delayed swallow initiation, and decreased hyolaryngeal elevation/excursion. No further bolus acceptance despite max verbal and tactile cues. Although no overt s/s aspiration observed, patient is at high risk for aspiration secondary to cognitive status, poor alertness, current RLL PNA, COPD, and dementia. Recommend continue NPO until alertness improves. Patient will benefit from skilled intervention to address the above impairments. Patients rehabilitation potential is considered to be Fair Factors which may influence rehabilitation potential include:  
[]            None noted [x]            Mental ability/status [x]            Medical condition []            Home/family situation and support systems [x]            Safety awareness 
[]            Pain tolerance/management []            Other: PLAN : 
Recommendations and Planned Interventions: 
--NPO 
--Non-oral route for medications 
--Frequent oral care --SLP to follow tomorrow for re-evaluation of swallow function Frequency/Duration: Patient will be followed by speech-language pathology 4 times a week to address goals. Discharge Recommendations: [place] SUBJECTIVE:  
Patient stated his name. Baseline diet is regular/thin per NH records. OBJECTIVE:  
 
Past Medical History:  
Diagnosis Date  Cancer ([facility] [address] [facility]  Chronic obstructive pulmonary disease ([facility] [address] [facility]  Dementia  Hypertension No past surgical history on file. Prior Level of Function/Home Situation:  
Home Situation Home Environment: Skilled nursing facility One/Two Story Residence: Other (Comment) Living Alone: No 
Support Systems: Skilled nursing facility Patient Expects to be Discharged to[de-identified] Skilled nursing facility Current DME Used/Available at Home: Other (comment)(unsure) Diet prior to admission: regular/thin Current Diet:  NPO Cognitive and Communication Status: 
Neurologic State: Confused, Eyes open to stimulus Orientation Level: Oriented to person, Disoriented to situation, Disoriented to time, Disoriented to place Cognition: Decreased attention/concentration, No command following Perseveration: No perseveration noted Safety/Judgement: Decreased awareness of environment, Decreased awareness of need for assistance, Decreased awareness of need for safety, Decreased insight into deficits Oral Assessment: 
Oral Assessment Labial: Other (comment)(unable to assess; no command following) Dentition: Natural 
P.O. Trials: 
Patient Position: upright in bed Vocal quality prior to P.O.: Low volume Consistency Presented: Thin liquid;Puree How Presented: SLP-fed/presented;Spoon;Straw Bolus Acceptance: Absent(max cues for one sip of water) Bolus Formation/Control: Impaired Type of Impairment: Delayed;Premature spillage Propulsion: Delayed (# of seconds) Initiation of Swallow: Delayed (# of seconds) Laryngeal Elevation: Decreased Aspiration Signs/Symptoms: None Pharyngeal Phase Characteristics: No impairment, issues, or problems Effective Modifications: None Cues for Modifications: None Oral Phase Severity: Severe Pharyngeal Phase Severity : Moderate NOMS:  
The NOMS functional outcome measure was used to quantify this patient's level of swallowing impairment. Based on the NOMS, the patient was determined to be at level 1 for swallow function G Codes: In compliance with CMSs Claims Based Outcome Reporting, the following G-code set was chosen for this patient based the use of the NOMS functional outcome to quantify this patient's level of swallowing impairment. Using the NOMS, the patient was determined to be at level 1 for swallow function which correlates with the CN= 100% level of severity. Based on the objective assessment provided within this note, the current, goal, and discharge g-codes are as follows: 
 
Swallow  Swallowing: 
 Swallow Current Status CN= 100%  Swallow Goal Status CJ= 20-39% NOMS Swallowing Levels: 
Level 1 (CN): NPO Level 2 (CM): NPO but takes consistency in therapy Level 3 (CL): Takes less than 50% of nutrition p.o. and continues with nonoral feedings; and/or safe with mod cues; and/or max diet restriction Level 4 (CK): Safe swallow but needs mod cues; and/or mod diet restriction; and/or still requires some nonoral feeding/supplements Level 5 (CJ): Safe swallow with min diet restriction; and/or needs min cues Level 6 (CI): Independent with p.o.; rare cues; usually self cues; may need to avoid some foods or needs extra time Level 7 (CH): Independent for all p.o. DUNIA (2003). National Outcomes Measurement System (NOMS): Adult Speech-Language Pathology User's Guide. Pain: 
Pain Scale 1: Numeric (0 - 10) Pain Intensity 1: 0 Pain Location 1: Abdomen After treatment:  
[]            Patient left in no apparent distress sitting up in chair [x]            Patient left in no apparent distress in bed 
[x]            Call bell left within reach [x]            Nursing notified 
[]            Caregiver present 
[]            Bed alarm activated COMMUNICATION/EDUCATION:  
The patients plan of care including recommendations, planned interventions, and recommended diet changes were discussed with: Registered Nurse. [x]            Posted safety precautions in patient's room. []            Patient/family have participated as able in goal setting and plan of care. []            Patient/family agree to work toward stated goals and plan of care. []            Patient understands intent and goals of therapy, but is neutral about his/her participation. [x]            Patient is unable to participate in goal setting and plan of care. Thank you for this referral. 
Lorne Garner, SLP Time Calculation: 10 mins

## 2018-10-18 NOTE — PROGRESS NOTES
Spoke with daughter Kenneth Hernandez. She confirmed that she is the MPOA. Patient has a wife Summer Chaudhary and a step-daughter Ankur Garza that are both allowed updates and information if they call. Spoke with [facility] regarding flu vaccine. Nurse stated she believed so but put nurse on hold. Transferred to several people multiple times. Unable to get a firm confirmation. Daughter Lola Cespedes stated she believed he received it as well.

## 2018-10-18 NOTE — ROUTINE PROCESS
TRANSFER - IN REPORT: 
 
Verbal report received from [facility](name) on Lavada Lasso  being received from [facility](unit) for routine progression of care Report consisted of patients Situation, Background, Assessment and  
Recommendations(SBAR). Information from the following report(s) SBAR was reviewed with the receiving nurse. Opportunity for questions and clarification was provided. Assessment completed upon patients arrival to unit and care assumed.

## 2018-10-18 NOTE — PROGRESS NOTES
Chart review. Patient admitted from [facility] and rehab. This CM placed call and left message on voicemail. Patient is not appropriate for assessment at this time. CM will continue to follow. Sulema Ambrocio RN CM Ext [phone]

## 2018-10-19 NOTE — PROGRESS NOTES
Reason for Admission:   Pneumonia RRAT Score:     14 Do you (patient/family) have any concerns for transition/discharge? Plan for utilizing home health:      
 
Likelihood of readmission? Moderate to high Transition of Care Plan:      Patient will return to LTC at [facility] and Rehab 
 
CM spoke with patient's daughter Steve Valdivia . Pt name and  confirmed as pt identifiers. Demographics confirmed. Patient is LTC resident at [facility]. Patient will return to [facility] at discharge. Per daughter patient was total care. He does not walk. Room Air. Patient has history of being a heavy smoker but has not smoked while at [facility]. Has been a resident there for approximately 5 weeks. Patient's PCP is the [facility]. Per patient's daughter voiding trials have been unsuccessful in the past. 
 
Care Management Interventions PCP Verified by CM: No 
Mode of Transport at Discharge: BLS Transition of Care Consult (CM Consult): Long Term Care([facility] [place], [facility] and Rehab) Current Support Network: [address] [place] [address] Plan discussed with Pt/Family/Caregiver: Yes Discharge Location Discharge Placement: [address] Patient will need medical transport at time of discharge. KARLOS Olivia Ext [phone]

## 2018-10-19 NOTE — PROGRESS NOTES
Cm spoke with Hong Anand at [facility] and Rehab. Hong Anand confirmed patient's baseline is confused. SW at [facility]  Did a BIMS (Brief Interview Mental Status). Score of 1-15. Patient score was 5. Patient required multiple cueing through out. If patient is to be discharge over the weekend call Kj Stockton at [phone] at [phone]. Rachel Kumar RNCM Ext [phone]

## 2018-10-19 NOTE — PROGRESS NOTES
Bedside shift change report given to me (oncoming nurse) by Ivy Russell RN (offgoing nurse). Report included the following information SBAR, Kardex, ED Summary, Recent Results, Med Rec Status and Cardiac Rhythm NSR. Avasys Telesitter Monitor initiated on 10/18/2018 at 1900 for the following reason(s): Confusion along with agitation and restlessness and concern for pt safety . Patient educated on use of camera and is in agreement. If patient unable to verbalize understanding of camera necessity, the responsible party notified and educated:  Noah Serrano was called but unable to get a hold of her and mailbox was full so unable to leave a message  I did call his step daughter Dayo Sat and educated her and let her know I tried to call Dmitriy Dolly. Bedside shift change report given to REBECCA Nguyen (oncoming nurse). Report included the following information SBAR, Kardex, ED Summary, Recent Results, Med Rec Status and Cardiac Rhythm NSR.  
 
SHIFT SUMMARY: 
 
Pt. Slept majority of the day. Later this evening appeared to be a little restless, pulled everything on bedside table into bed with him, was pulling at IV site. We put avysis in room to help with safety precautions. [facility] Date 10/18/2018 Admission Diagnosis PNA (pneumonia) Consults IP CONSULT TO HOSPITALIST Cardiac Monitoring [x] Yes [] No  
  
Purposeful Hourly Rounding [x] Yes   
Brice Score Total Score: 4 Brice score 3 or > [x] Bed Alarm [x] Avasys [] 1:1 sitter [] Patient refused (Signed refusal form in chart) Bill Score Bill Score: 13 Bill score 14 or < [] PMT consult [] Wound Care consult  
 []  Specialty bed  [] Nutrition consult Influenza Vaccine Received Flu Vaccine for Current Season (usually Sept-March): Yes([facility] stated \"they beleive so\" no one availab) Oxygen needs? [x] Room air Oxygen @  []1L    []2L    []3L   []4L    []5L   []6L via NC  
 Chronic home O2 use? [] Yes [] No 
Perform O2 challenge test and document in progress note using smartphrase (.Homeoxygen) Last bowel movement Last Bowel Movement Date: (unable to assess) Urinary Catheter Urinary Catheter 10/18/18 Mendoza-Indications for Use: Chronic urinary retention/bladder outlet obstruction Urinary Catheter 10/18/18 Mendoza-Urine Output (mL): 450 ml LDAs Peripheral IV 10/18/18 Left Forearm (Active) Site Assessment Clean, dry, & intact 10/18/2018  7:42 PM  
Phlebitis Assessment 0 10/18/2018  3:37 PM  
Infiltration Assessment 0 10/18/2018  3:37 PM  
Dressing Status Clean, dry, & intact 10/18/2018  3:37 PM  
Dressing Type Tape 10/18/2018  3:37 PM  
Hub Color/Line Status Pink; Infusing 10/18/2018  3:37 PM  
                  
  
Readmission Risk Assessment Tool Score Medium Risk 15 Total Score 3 Has Seen PCP in Last 6 Months (Yes=3, No=0) 2 . Living with Significant Other. Assisted Living. LTAC. SNF. or  
Rehab  
 9 Pt. Coverage (Medicare=5 , Medicaid, or Self-Pay=4) Criteria that do not apply:  
 Patient Length of Stay (>5 days = 3) IP Visits Last 12 Months (1-3=4, 4=9, >4=11) Charlson Comorbidity Score (Age + Comorbid Conditions) Expected Length of Stay 4d 21h Actual Length of Stay 0

## 2018-10-19 NOTE — PROGRESS NOTES
0700: Bedside shift change report given to Damaso Lagunas, [facility] (oncoming nurse) by Kameron Hernandez RN (offgoing nurse). Report included the following information SBAR and Kardex. 1248: Paged Dr. Sindi Dodson in regards to abnormal darrell coma score. Discussed consulting neuro. Per Dr. Sindi Dodson no need for neuro at this time given patient's hx of dementia and AMS.

## 2018-10-19 NOTE — PROGRESS NOTES
Problem: Dysphagia (Adult) Goal: *Acute Goals and Plan of Care (Insert Text) Speech pathology goals Initiated 10/18/2018 1. Patient will participate in re-evaluation of swallow function within 7 days Speech language pathology dysphagia treatment Patient: Jorge Antunez [de-identified]82 y.o. male) Date: 10/19/2018 Diagnosis: PNA (pneumonia) <principal problem not specified> Precautions: aspiration ASSESSMENT: 
Pt seen today for reeval of swallowing. He is awake but appears drowsy, with eyes moving about randomly. He accepted the spoon and straw but needed extra time and verbal cues to close his mouth on the spoon/straw. Mastication of the ice was reduced and oral prep was very slow and incomplete. He swallowed only one of two ice chip bolus' spontaneously. There were multiple audible swallows due to pharyngeal residue most likely. With water he did the same. Very impaired pharyngeal phase. He exhibited a weak cough after the swallow of thins. He had dried yellow secretions on his hard palate that was cleaned off with use of suction. He is most likely coughing up his secretions but cannot fully clear them and they cling to his hard palate. Aggressive oral care is needed. Discussed iw nsg Progression toward goals: 
[]         Improving appropriately and progressing toward goals 
[]         Improving slowly and progressing toward goals 
[]         Not making progress toward goals and plan of care will be adjusted PLAN: 
Recommendations and Planned Interventions: NPO Small ice chip bolus' per nsg if he is alert and accepts it. Very scant amt should be given and only by nsg. Patient continues to benefit from skilled intervention to address the above impairments. Continue treatment per established plan of care. Discharge Recommendations:  None SUBJECTIVE:  
Patient stated his name with reduced intelligibility OBJECTIVE:  
Cognitive and Communication Status: 
Neurologic State: Drowsy Orientation Level: Oriented to person, Disoriented to time, Disoriented to place, Disoriented to situation Cognition: Impaired decision making, Decreased command following Perception: Appears intact Perseveration: No perseveration noted Safety/Judgement: Decreased awareness of environment, Decreased awareness of need for assistance, Decreased awareness of need for safety, Decreased insight into deficits Dysphagia Treatment: 
Oral Assessment: 
Oral Assessment Labial: No impairment Dentition: Poor;Limited;Natural 
Lingual: Other (comment) Velum: Other (comment) Mandible: No impairment P.O. Trials: 
  
Vocal quality prior to P.O.: Low volume Consistency Presented: Ice chips; Thin liquid How Presented: Self-fed/presented;Straw;SLP-fed/presented;Spoon Bolus Acceptance: Impaired Bolus Formation/Control: Impaired Type of Impairment: Delayed;Poor;Mastication; Incomplete;Lip closure Propulsion: Delayed (# of seconds) Oral Residue: Greater than 50% of bolus Initiation of Swallow: Absent Laryngeal Elevation: Decreased Aspiration Signs/Symptoms: Weak cough Pharyngeal Phase Characteristics: Multiple swallows; Suspected pharyngeal residue Oral Phase Severity: Moderate-severe Pharyngeal Phase Severity : Moderate-severe Pain: 
Pain Scale 1: Numeric (0 - 10) Pain Intensity 1: 0 After treatment:  
[]              Patient left in no apparent distress sitting up in chair 
[x]              Patient left in no apparent distress in bed 
[x]              Call bell left within reach [x]              Nursing notified 
[]              Caregiver present 
[]              Bed alarm activated COMMUNICATION/EDUCATION:  
 
 
The patients plan of care including recommendations, planned interventions, and recommended diet changes were discussed with: Registered Nurse. []              Posted safety precautions in patient's room. LINN Cobos Time Calculation: 15 mins

## 2018-10-19 NOTE — PROGRESS NOTES
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 
WBC wnl, afebrile. UCx positive for gram negative rods and possibly another bacteria, >100k GNR. Continue broad spectrum abx with Zosyn for now. Keep NPO until cleared by speech. Hypomagnesemia Hypokalemia Hypophosphatemia Replaced, will monitor Hypothyroidism Continue Synthyroid 112 mcg daily Hyperlipidemia Continue Lipitor 20 mg daily Change mental status most likely secondary to metabolic encephalopathy  
-Head CT w/o acute findings COPD Continue DuoNeb nebulizer as needed Hypertension Start patient on antihypertensive medication hydralazine as needed 18.5 - 24.9 Normal weight / Body mass index is 24.22 kg/m². Code status: Full Prophylaxis: Lovenox Recommended Disposition: [place] Subjective: Chief Complaint / Reason for Physician Visit Demented. Discussed with RN events overnight. Review of Systems: 
Symptom Y/N Comments  Symptom Y/N Comments Fever/Chills    Chest Pain Poor Appetite    Edema Cough    Abdominal Pain Sputum    Joint Pain SOB/WEBSTER    Pruritis/Rash Nausea/vomit    Tolerating PT/OT Diarrhea    Tolerating Diet Constipation    Other Could NOT obtain due to: demented Objective: VITALS:  
Last 24hrs VS reviewed since prior progress note. Most recent are: 
Patient Vitals for the past 24 hrs: 
 Temp Pulse Resp BP SpO2  
10/19/18 1136 98.4 °F (36.9 °C) 84 18 151/82 98 % 10/19/18 0737 98.2 °F (36.8 °C) 78 18 168/78 97 % 10/19/18 0200 98.4 °F (36.9 °C) 78 18 167/60 96 % 10/18/18 2234 98 °F (36.7 °C) 79 20 153/82 100 % 10/18/18 1943 98 °F (36.7 °C) 77 16 170/75 100 % 10/18/18 1537 97.7 °F (36.5 °C) 77 15 136/68 99 % Intake/Output Summary (Last 24 hours) at 10/19/2018 1304 Last data filed at 10/19/2018 3242 Gross per 24 hour Intake  Output 1380 ml Net -1380 ml PHYSICAL EXAM: 
General: Drowsy, NAD 
EENT:  EOMI. Anicteric sclerae. MMM Resp:  CTA bilaterally, no wheezing or rales. No accessory muscle use CV:  Regular  rhythm,  No edema GI:  Soft, Non distended, Non tender.  +Bowel sounds Neurologic:  Alert and oriented X 0, demented - confused at baseline as well Psych:   No insight Skin:  No rashes. No jaundice Reviewed most current lab test results and cultures  YES Reviewed most current radiology test results   YES Review and summation of old records today    NO Reviewed patient's current orders and MAR    YES 
PMH/ reviewed - no change compared to H&P 
________________________________________________________________________ Care Plan discussed with: 
  Comments Patient x Family RN x Care Manager Consultant Multidiciplinary team rounds were held today with , nursing, pharmacist and clinical coordinator. Patient's plan of care was discussed; medications were reviewed and discharge planning was addressed. ________________________________________________________________________ Total NON critical care TIME:  25   Minutes Total CRITICAL CARE TIME Spent:   Minutes non procedure based Comments >50% of visit spent in counseling and coordination of care    
________________________________________________________________________ Gloria Isidro MD  
 
Procedures: see electronic medical records for all procedures/Xrays and details which were not copied into this note but were reviewed prior to creation of Plan. LABS: 
I reviewed today's most current labs and imaging studies. Pertinent labs include: 
Recent Labs 10/19/18 
3381 10/18/18 
7657 WBC 8.8 9.3 HGB 12.4 12.8 HCT 35.5* 36.6  257 Recent Labs 10/19/18 
6769 10/18/18 
7640  133* K 3.1* 3.5  100 CO2 24 24 GLU 72 110* BUN 7 11 CREA 0.65* 0.70 CA 8.3* 8.7 MG 1.7  --   
PHOS 1.9*  --   
ALB  --  2.6* TBILI  --  0.7 SGOT  --  37 ALT  --  28 Signed: Viv Richard MD

## 2018-10-20 NOTE — PROGRESS NOTES
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 
WBC wnl, afebrile. UCx positive for gram negative rods and possibly another bacteria, >100k GNR. Continue broad spectrum abx with Zosyn for now. Keep NPO until cleared by speech. Hypomagnesemia - resolved Hypokalemia Hypophosphatemia - resolved Replaced, will monitor 10/20: 
UCx results noted - continue IV abx broad spectrum. Keep pt NPO till cleared by Speech. Hopeful that as pt's infection improves, he will regain his capacity to eat somewhat. Called pt's family as per the number in Demographics but was unable to reach anyone. Need to contact family as if pt is unable to tolerate PO then goals of care need to be discussed. Hypothyroidism Continue Synthyroid 112 mcg daily Hyperlipidemia Continue Lipitor 20 mg daily Change mental status most likely secondary to metabolic encephalopathy  
-Head CT w/o acute findings COPD Continue DuoNeb nebulizer as needed Hypertension Start patient on antihypertensive medication hydralazine as needed 
  
18.5 - 24.9 Normal weight / Body mass index is 24.22 kg/m². 
  
Code status: Full Prophylaxis: Lovenox Recommended Disposition: [place] Subjective: Chief Complaint / Reason for Physician Visit Demented. Discussed with RN events overnight. Review of Systems: 
Symptom Y/N Comments  Symptom Y/N Comments Fever/Chills    Chest Pain Poor Appetite    Edema Cough    Abdominal Pain Sputum    Joint Pain SOB/WEBSTER    Pruritis/Rash Nausea/vomit    Tolerating PT/OT Diarrhea    Tolerating Diet Constipation    Other Could NOT obtain due to: Demented Objective: VITALS:  
 Last 24hrs VS reviewed since prior progress note. Most recent are: 
Patient Vitals for the past 24 hrs: 
 Temp Pulse Resp BP SpO2  
10/20/18 1125 98.3 °F (36.8 °C) 74 16 143/81 98 % 10/20/18 0737 98 °F (36.7 °C) 68 20 158/72 97 % 10/20/18 0354 97.9 °F (36.6 °C) 73 20 155/79 94 % 10/19/18 2336 97.4 °F (36.3 °C) 86 18 156/85 97 % 10/19/18 1939 98.5 °F (36.9 °C) 82 18 159/70 99 % 10/19/18 1445 98.5 °F (36.9 °C) 79 16 156/72 98 % Intake/Output Summary (Last 24 hours) at 10/20/2018 1315 Last data filed at 10/20/2018 1156 Gross per 24 hour Intake 3451.25 ml Output 2650 ml Net 801.25 ml PHYSICAL EXAM: 
General:          Drowsy, NAD 
EENT:              EOMI. Anicteric sclerae. MMM Resp:               CTA bilaterally, no wheezing or rales. No accessory muscle use CV:                  Regular  rhythm,  No edema GI:                   Soft, Non distended, Non tender.  +Bowel sounds Neurologic:      Alert and oriented X 0, demented - confused at baseline as well Psych:             No insight Skin:                No rashes. No jaundice Reviewed most current lab test results and cultures  YES Reviewed most current radiology test results   YES Review and summation of old records today    NO Reviewed patient's current orders and MAR    YES 
PMH/ reviewed - no change compared to H&P 
________________________________________________________________________ Care Plan discussed with: 
  Comments Patient x Family RN x Care Manager Consultant Multidiciplinary team rounds were held today with , nursing, pharmacist and clinical coordinator. Patient's plan of care was discussed; medications were reviewed and discharge planning was addressed. ________________________________________________________________________ Total NON critical care TIME:  25   Minutes Total CRITICAL CARE TIME Spent:   Minutes non procedure based Comments >50% of visit spent in counseling and coordination of care    
________________________________________________________________________ Todd Harris MD  
 
Procedures: see electronic medical records for all procedures/Xrays and details which were not copied into this note but were reviewed prior to creation of Plan. LABS: 
I reviewed today's most current labs and imaging studies. Pertinent labs include: 
Recent Labs 10/20/18 
0100 10/19/18 
6947 10/18/18 
0209 WBC 10.3 8.8 9.3 HGB 11.6* 12.4 12.8 HCT 34.1* 35.5* 36.6  231 257 Recent Labs 10/20/18 
0100 10/19/18 
0685 10/18/18 
0209  137 133* K 3.2* 3.1* 3.5  103 100 CO2 22 24 24 GLU 63* 72 110* BUN 7 7 11 CREA 0.70 0.65* 0.70 CA 8.1* 8.3* 8.7 MG 2.0 1.7  --   
PHOS 3.2 1.9*  --   
ALB  --   --  2.6* TBILI  --   --  0.7 SGOT  --   --  37 ALT  --   --  28 Signed: Todd Harris MD

## 2018-10-20 NOTE — PROGRESS NOTES
8:00 AM Received bedside verbal report from Jaspreet Vogt, Student nurse is with me today doing assesements and passing meds under my supervision. 10:27 AM spoke with Dr. Agapito Jones about pateint trying to pull all the lines. Md ok with putting the orders for sitter.

## 2018-10-20 NOTE — PROGRESS NOTES
Bedside shift change report given to Paula Mueller RN (oncoming nurse). Report included the following information SBAR, Kardex, Intake/Output, MAR, Accordion and Recent Results. SHIFT SUMMARY: 
 
 
 
 
 
[address] NURSING NOTE Admission Date 10/18/2018 Admission Diagnosis PNA (pneumonia) Consults None Cardiac Monitoring [x] Yes [] No  
  
Purposeful Hourly Rounding [x] Yes   
Brice Score Total Score: 4 Brice score 3 or > [x] Bed Alarm [] Avasys [] 1:1 sitter [] Patient refused (Signed refusal form in chart) Bill Score Bill Score: 13 Bill score 14 or < [] PMT consult [] Wound Care consult  
 []  Specialty bed  [] Nutrition consult Influenza Vaccine Received Flu Vaccine for Current Season (usually Sept-March): Yes([facility] stated \"they beleive so\" no one availab) Oxygen needs? [x] Room air Oxygen @  []1L    []2L    []3L   []4L    []5L   []6L via NC Chronic home O2 use? [] Yes [x] No 
Perform O2 challenge test and document in progress note using [employer] (.Homeoxygen) Last bowel movement Last Bowel Movement Date: (unknown) Urinary Catheter Urinary Catheter 10/18/18 Mendoza-Indications for Use: Chronic urinary retention/bladder outlet obstruction Urinary Catheter 10/18/18 Mendoza-Urine Output (mL): 350 ml LDAs Peripheral IV 10/18/18 Left Forearm (Active) Site Assessment Clean, dry, & intact 10/20/2018  7:47 PM  
Phlebitis Assessment 0 10/20/2018  7:47 PM  
Infiltration Assessment 0 10/20/2018  7:47 PM  
Dressing Status Clean, dry, & intact 10/20/2018  7:47 PM  
Dressing Type Transparent 10/20/2018  7:47 PM  
Hub Color/Line Status Pink; Infusing 10/20/2018  7:47 PM  
   
Peripheral IV 10/19/18 Anterior;Left;Superior; Upper Arm (Active) Site Assessment Clean, dry, & intact; Clean 10/20/2018  7:47 PM  
Phlebitis Assessment 0 10/20/2018  7:47 PM  
Infiltration Assessment 0 10/20/2018  7:47 PM  
 Dressing Status Clean, dry, & intact 10/20/2018  7:47 PM  
Dressing Type Transparent 10/20/2018  7:47 PM  
Hub Color/Line Status Pink;Capped 10/20/2018  7:47 PM  
                  
  
Readmission Risk Assessment Tool Score Medium Risk 15 Total Score 3 Has Seen PCP in Last 6 Months (Yes=3, No=0) 2 . Living with Significant Other. Assisted Living. LTAC. SNF. or  
Rehab  
 9 Pt. Coverage (Medicare=5 , Medicaid, or Self-Pay=4) Criteria that do not apply:  
 Patient Length of Stay (>5 days = 3) IP Visits Last 12 Months (1-3=4, 4=9, >4=11) Charlson Comorbidity Score (Age + Comorbid Conditions) Expected Length of Stay 4d 21h Actual Length of Stay 2

## 2018-10-20 NOTE — PROGRESS NOTES
Problem: Falls - Risk of 
Goal: *Absence of Falls Document [facility] Fall Risk and appropriate interventions in the flowsheet. Outcome: Progressing Towards Goal 
Fall Risk Interventions: 
Mobility Interventions: Assess mobility with egress test, Bed/chair exit alarm, Communicate number of staff needed for ambulation/transfer, Mechanical lift, OT consult for ADLs, Patient to call before getting OOB, PT Consult for mobility concerns, PT Consult for assist device competence Mentation Interventions: Adequate sleep, hydration, pain control, Bed/chair exit alarm, Door open when patient unattended, Evaluate medications/consider consulting pharmacy, Eyeglasses and hearing aids, Familiar objects from home, Increase mobility, More frequent rounding, Reorient patient, Room close to nurse's station, Self-releasing belt, Toileting rounds, Update white board Medication Interventions: Assess postural VS orthostatic hypotension, Bed/chair exit alarm, Evaluate medications/consider consulting pharmacy, Patient to call before getting OOB, Teach patient to arise slowly Elimination Interventions: Bed/chair exit alarm, Call light in reach, Elevated toilet seat, Patient to call for help with toileting needs, Toilet paper/wipes in reach, Toileting schedule/hourly rounds

## 2018-10-21 NOTE — PROGRESS NOTES
Problem: Pressure Injury - Risk of 
Goal: *Prevention of pressure injury Document Bill Scale and appropriate interventions in the flowsheet. Outcome: Progressing Towards Goal 
Pressure Injury Interventions: 
Sensory Interventions: Assess changes in LOC, Assess need for specialty bed, Avoid rigorous massage over bony prominences, Chair cushion, Check visual cues for pain, Discuss PT/OT consult with provider, Float heels, Keep linens dry and wrinkle-free Moisture Interventions: Absorbent underpads, Apply protective barrier, creams and emollients, Assess need for specialty bed, Check for incontinence Q2 hours and as needed, Contain wound drainage, Internal/External fecal devices, Internal/External urinary devices, Limit adult briefs Activity Interventions: Assess need for specialty bed, Chair cushion, Increase time out of bed, Pressure redistribution bed/mattress(bed type), PT/OT evaluation Mobility Interventions: Assess need for specialty bed, Chair cushion, Float heels, HOB 30 degrees or less, Pressure redistribution bed/mattress (bed type), PT/OT evaluation, Suspension boots Nutrition Interventions: Document food/fluid/supplement intake, Discuss nutritional consult with provider, Offer support with meals,snacks and hydration Friction and Shear Interventions: Apply protective barrier, creams and emollients, Feet elevated on foot rest, Foam dressings/transparent film/skin sealants, HOB 30 degrees or less, Lift sheet, Lift team/patient mobility team, Minimize layers, Transfer aides:transfer board/Kel lift/ceiling lift, Transferring/repositioning devices Problem: Falls - Risk of 
Goal: *Absence of Falls Document [facility] Fall Risk and appropriate interventions in the flowsheet.  
Fall Risk Interventions: 
Mobility Interventions: Bed/chair exit alarm, Communicate number of staff needed for ambulation/transfer, Mechanical lift, OT consult for ADLs, Patient to call before getting OOB, PT Consult for mobility concerns Mentation Interventions: Adequate sleep, hydration, pain control, Bed/chair exit alarm, Door open when patient unattended, Evaluate medications/consider consulting pharmacy, Eyeglasses and hearing aids, Familiar objects from home, Increase mobility, More frequent rounding, Reorient patient, Room close to nurse's station, Self-releasing belt, Toileting rounds, Update white board Medication Interventions: Bed/chair exit alarm, Evaluate medications/consider consulting pharmacy, Patient to call before getting OOB, Teach patient to arise slowly Elimination Interventions: Bed/chair exit alarm, Call light in reach, Elevated toilet seat, Patient to call for help with toileting needs, Toilet paper/wipes in reach, Toileting schedule/hourly rounds

## 2018-10-21 NOTE — PROGRESS NOTES
8:00 AM received bedside verbal report from [facility] 
 
5:01 PM Dr. Zak Pratt aware of the mike leaking. NO orders were received at this moment. Dyspagia screening done per Dr. Zak Pratt as speech is not available for eval during weekends. Patient had no problem swallowing water with/ without straws, and had no problems with applesauce either.

## 2018-10-21 NOTE — PROGRESS NOTES
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 
WBC wnl, afebrile. UCx positive for gram negative rods and possibly another bacteria, >100k GNR. Continue broad spectrum abx with Zosyn for now. Keep NPO until cleared by speech. Hypomagnesemia - resolved Hypokalemia Hypophosphatemia - resolved Replaced, will monitor 10/20: 
UCx results noted - continue IV abx broad spectrum. Keep pt NPO till cleared by Speech. Hopeful that as pt's infection improves, he will regain his capacity to eat somewhat. Called pt's family as per the number in Demographics but was unable to reach anyone. Need to contact family as if pt is unable to tolerate PO then goals of care need to be discussed. 10/21: 
Pt tolerating some PO today. RN Nigel Tracy spoke with [place] and pt was eating Pureed there. Will attempt to do the same. Continue IV abx. Will follow BCx. WBC wnl now and remains afebrile. Likely for DC tmr. Hypothyroidism Continue Synthyroid 112 mcg daily Hyperlipidemia Continue Lipitor 20 mg daily Change mental status most likely secondary to metabolic encephalopathy  
-Head CT w/o acute findings COPD Continue DuoNeb nebulizer as needed Hypertension Start patient on antihypertensive medication hydralazine as needed 
  
18.5 - 24.9 Normal weight / Body mass index is 24.22 kg/m². 
  
Code status: Full Prophylaxis: Lovenox Recommended Disposition: [place] Subjective: Chief Complaint / Reason for Physician Visit Pt tolerating some PO today. Discussed with RN events overnight. Review of Systems: 
Symptom Y/N Comments  Symptom Y/N Comments Fever/Chills    Chest Pain Poor Appetite    Edema Cough    Abdominal Pain Sputum    Joint Pain SOB/WEBSTER    Pruritis/Rash Nausea/vomit    Tolerating PT/OT Diarrhea    Tolerating Diet Constipation    Other Could NOT obtain due to: demented Objective: VITALS:  
Last 24hrs VS reviewed since prior progress note. Most recent are: 
Patient Vitals for the past 24 hrs: 
 Temp Pulse Resp BP SpO2  
10/21/18 1114 97.8 °F (36.6 °C) 80 18 152/73 99 % 10/21/18 0705 97.8 °F (36.6 °C) 73 18 166/89 98 % 10/21/18 0328 97.9 °F (36.6 °C) 77 18 173/80 99 % 10/20/18 2307 98.4 °F (36.9 °C) 76 18 141/69 98 % 10/20/18 2005 98.3 °F (36.8 °C) 70 16 168/80 95 % 10/20/18 1517 98 °F (36.7 °C) 66 16 138/68 100 % Intake/Output Summary (Last 24 hours) at 10/21/2018 1449 Last data filed at 10/21/2018 0850 Gross per 24 hour Intake 2828.75 ml Output 1350 ml Net 1478.75 ml PHYSICAL EXAM: 
General:          More awake and alert today EENT:              EOMI. Anicteric sclerae. MMM Resp:               CTA bilaterally, no wheezing or rales.  No accessory muscle use HI:                  [id number]  rhythm,  No edema GI:                   Soft, Non distended, Non tender.  +Bowel sounds Neurologic:      Alert and oriented X 0, demented - confused at baseline as well Psych:             No insight Skin:                No rashes.  No jaundice Reviewed most current lab test results and cultures  YES Reviewed most current radiology test results   YES Review and summation of old records today    NO Reviewed patient's current orders and MAR    YES 
PMH/SH reviewed - no change compared to H&P 
________________________________________________________________________ Care Plan discussed with: 
  Comments Patient x Family RN x Care Manager Consultant Multidiciplinary team rounds were held today with , nursing, pharmacist and clinical coordinator.   Patient's plan of care was discussed; medications were reviewed and discharge planning was addressed. ________________________________________________________________________ Total NON critical care TIME:  25   Minutes Total CRITICAL CARE TIME Spent:   Minutes non procedure based Comments >50% of visit spent in counseling and coordination of care    
________________________________________________________________________ Bartolo Olea MD  
 
Procedures: see electronic medical records for all procedures/Xrays and details which were not copied into this note but were reviewed prior to creation of Plan. LABS: 
I reviewed today's most current labs and imaging studies. Pertinent labs include: 
Recent Labs 10/21/18 
0300 10/20/18 
0100 10/19/18 
9326 WBC 10.5 10.3 8.8 HGB 12.4 11.6* 12.4 HCT 36.1* 34.1* 35.5*  226 231 Recent Labs 10/21/18 
0300 10/20/18 
0100 10/19/18 
1123  137 137  
K 3.5 3.2* 3.1*  
 104 103 CO2 19* 22 24 GLU 64* 63* 72 BUN 7 7 7 CREA 0.65* 0.70 0.65* CA 8.4* 8.1* 8.3*  
MG  --  2.0 1.7 PHOS  --  3.2 1.9* Signed: Bartolo Olea MD

## 2018-10-21 NOTE — PROGRESS NOTES
Bedside shift change report given to Yesenia Fuller RN (oncoming nurse). Report included the following information SBAR, Kardex, Intake/Output, MAR and Recent Results. SHIFT SUMMARY: 
 
 
 
 
 
[facility] NURSING NOTE Admission Date 10/18/2018 Admission Diagnosis PNA (pneumonia) Consults None Cardiac Monitoring [x] Yes [] No  
  
Purposeful Hourly Rounding [x] Yes   
Brice Score Total Score: 4 Brice score 3 or > [x] Bed Alarm [] Avasys [] 1:1 sitter [] Patient refused (Signed refusal form in chart) Bill Score Bill Score: 16 Bill score 14 or < [x] PMT consult [] Wound Care consult  
 []  Specialty bed  [] Nutrition consult Influenza Vaccine Received Flu Vaccine for Current Season (usually Sept-March): Yes([facility] stated \"they beleive so\" no one availab) Oxygen needs? [x] Room air Oxygen @  []1L    []2L    []3L   []4L    []5L   []6L via NC Chronic home O2 use? [] Yes [x] No 
Perform O2 challenge test and document in progress note using smartphrase (.Homeoxygen) Last bowel movement Last Bowel Movement Date: (unknown) Urinary Catheter Urinary Catheter 10/18/18 Mendoza-Indications for Use: Chronic urinary retention/bladder outlet obstruction Urinary Catheter 10/18/18 Mendoza-Urine Output (mL): 700 ml LDAs Peripheral IV 10/18/18 Left Forearm (Active) Site Assessment Clean, dry, & intact 10/21/2018  7:30 PM  
Phlebitis Assessment 0 10/21/2018  7:30 PM  
Infiltration Assessment 0 10/21/2018  7:30 PM  
Dressing Status Clean, dry, & intact 10/21/2018  7:30 PM  
Dressing Type Transparent 10/21/2018  7:30 PM  
Hub Color/Line Status Pink; Infusing 10/21/2018  7:30 PM  
   
Peripheral IV 10/19/18 Anterior;Left;Superior; Upper Arm (Active) Site Assessment Clean, dry, & intact 10/21/2018  7:30 PM  
Phlebitis Assessment 0 10/21/2018  7:30 PM  
Infiltration Assessment 0 10/21/2018  7:30 PM  
Dressing Status Clean, dry, & intact 10/21/2018  7:30 PM  
 Dressing Type Transparent 10/21/2018  7:30 PM  
Hub Color/Line Status Pink;Capped 10/21/2018  7:30 PM  
                  
  
Readmission Risk Assessment Tool Score Medium Risk 15 Total Score 3 Has Seen PCP in Last 6 Months (Yes=3, No=0) 2 . Living with Significant Other. Assisted Living. LTAC. SNF. or  
Rehab  
 9 Pt. Coverage (Medicare=5 , Medicaid, or Self-Pay=4) Criteria that do not apply:  
 Patient Length of Stay (>5 days = 3) IP Visits Last 12 Months (1-3=4, 4=9, >4=11) Charlson Comorbidity Score (Age + Comorbid Conditions) Expected Length of Stay 4d 21h Actual Length of Stay 3

## 2018-10-22 NOTE — INTERDISCIPLINARY ROUNDS
1100 
Cardiopulmonary Care Interdisciplinary Rounds were held today to discuss patient's plan of care and outcomes. The following members were present: NP/Physician, Pharmacy, Nursing and Case Management. Expected Length of Stay:  4d 21h Plan of Care: Continue current treatment plan

## 2018-10-22 NOTE — PROGRESS NOTES
Problem: Pressure Injury - Risk of 
Goal: *Prevention of pressure injury Document Bill Scale and appropriate interventions in the flowsheet. Outcome: Progressing Towards Goal 
Pressure Injury Interventions: 
Sensory Interventions: Assess changes in LOC Moisture Interventions: Absorbent underpads Activity Interventions: Assess need for specialty bed Mobility Interventions: Assess need for specialty bed, PT/OT evaluation Nutrition Interventions: Document food/fluid/supplement intake Friction and Shear Interventions: Apply protective barrier, creams and emollients Problem: Falls - Risk of 
Goal: *Absence of Falls Document Mervin Levels Fall Risk and appropriate interventions in the flowsheet. Outcome: Progressing Towards Goal 
Fall Risk Interventions: 
Mobility Interventions: Bed/chair exit alarm, Patient to call before getting OOB Mentation Interventions: Adequate sleep, hydration, pain control, Bed/chair exit alarm Medication Interventions: Bed/chair exit alarm Elimination Interventions: Bed/chair exit alarm, Call light in reach

## 2018-10-22 NOTE — PROGRESS NOTES
Bedside shift change report given to Cata Soliz RN (oncoming nurse). Report included the following information SBAR, Kardex, ED Summary, Intake/Output, MAR and Recent Results. SHIFT SUMMARY: 
0700: Bedside shift change report given to Jose Lombardo RN (oncoming nurse) by Cata Soliz RN (offgoing nurse). Report included the following information SBAR, Kardex, ED Summary, Intake/Output, MAR and Recent Results. Pt had uneventful shift -- sitter still at bedside, plan for modified barium swallow in AM 
 
 
 
[address] NURSING NOTE Admission Date 10/18/2018 Admission Diagnosis PNA (pneumonia) Consults None Cardiac Monitoring [x] Yes [] No  
  
Purposeful Hourly Rounding [x] Yes   
Brice Score Total Score: 4 Brice score 3 or > [x] Bed Alarm [] Avasys [x] 1:1 sitter [] Patient refused (Signed refusal form in chart) Bill Score Bill Score: 15 Bill score 14 or < [] PMT consult [] Wound Care consult  
 []  Specialty bed  [] Nutrition consult Influenza Vaccine Received Flu Vaccine for Current Season (usually Sept-March): Yes([facility] stated \"they beleive so\" no one availab) Oxygen needs? [x] Room air Oxygen @  []1L    []2L    []3L   []4L    []5L   []6L via NC Chronic home O2 use? [] Yes [] No 
Perform O2 challenge test and document in progress note using smartphrase (.Homeoxygen) Last bowel movement Last Bowel Movement Date: 10/21/18 Urinary Catheter Urinary Catheter 10/18/18 Mendoza-Indications for Use: Chronic urinary retention/bladder outlet obstruction Urinary Catheter 10/18/18 Mendoza-Urine Output (mL): 325 ml LDAs Peripheral IV 10/18/18 Left Forearm (Active) Site Assessment Clean, dry, & intact 10/22/2018  2:43 PM  
Phlebitis Assessment 0 10/22/2018  2:43 PM  
Infiltration Assessment 0 10/22/2018  2:43 PM  
Dressing Status Clean, dry, & intact 10/22/2018  2:43 PM  
Dressing Type Transparent;Tape 10/22/2018  2:43 PM  
 Hub Color/Line Status Pink;Patent; Flushed 10/22/2018  2:43 PM  
   
Peripheral IV 10/19/18 Anterior;Left;Superior; Upper Arm (Active) Site Assessment Clean, dry, & intact 10/22/2018  2:43 PM  
Phlebitis Assessment 0 10/22/2018  2:43 PM  
Infiltration Assessment 0 10/22/2018  2:43 PM  
Dressing Status Clean, dry, & intact 10/22/2018  2:43 PM  
Dressing Type Transparent;Tape 10/22/2018  2:43 PM  
Hub Color/Line Status Pink;Patent; Flushed 10/22/2018  2:43 PM  
                  
  
Readmission Risk Assessment Tool Score Medium Risk 15 Total Score 3 Has Seen PCP in Last 6 Months (Yes=3, No=0) 2 . Living with Significant Other. Assisted Living. LTAC. SNF. or  
Rehab  
 9 Pt. Coverage (Medicare=5 , Medicaid, or Self-Pay=4) Criteria that do not apply:  
 Patient Length of Stay (>5 days = 3) IP Visits Last 12 Months (1-3=4, 4=9, >4=11) Charlson Comorbidity Score (Age + Comorbid Conditions) Expected Length of Stay 4d 21h Actual Length of Stay 4

## 2018-10-22 NOTE — PROGRESS NOTES
Orders received, chart reviewed. Pt is dependent in ADLs and is non-ambulatory at baseline and receives assistance at the LTC facility he resides at. No OT needs identified at this time. Will sign off.

## 2018-10-22 NOTE — PROGRESS NOTES
Problem: Pressure Injury - Risk of 
Goal: *Prevention of pressure injury Document Bill Scale and appropriate interventions in the flowsheet. Outcome: Progressing Towards Goal 
Pressure Injury Interventions: 
Sensory Interventions: Assess changes in LOC, Assess need for specialty bed, Avoid rigorous massage over bony prominences, Chair cushion, Check visual cues for pain, Discuss PT/OT consult with provider, Float heels, Keep linens dry and wrinkle-free Moisture Interventions: Absorbent underpads, Apply protective barrier, creams and emollients, Assess need for specialty bed, Check for incontinence Q2 hours and as needed, Contain wound drainage, Internal/External urinary devices, Limit adult briefs Activity Interventions: Chair cushion, Increase time out of bed, Pressure redistribution bed/mattress(bed type), PT/OT evaluation Mobility Interventions: Assess need for specialty bed, Float heels, HOB 30 degrees or less, Pressure redistribution bed/mattress (bed type), PT/OT evaluation, Suspension boots Nutrition Interventions: Document food/fluid/supplement intake, Discuss nutritional consult with provider, Offer support with meals,snacks and hydration Friction and Shear Interventions: Apply protective barrier, creams and emollients, Feet elevated on foot rest, Foam dressings/transparent film/skin sealants, HOB 30 degrees or less, Lift sheet, Lift team/patient mobility team, Minimize layers, Sit at 90-degree angle Problem: Falls - Risk of 
Goal: *Absence of Falls Document Joce Hugo Fall Risk and appropriate interventions in the flowsheet. Outcome: Progressing Towards Goal 
Fall Risk Interventions: 
Mobility Interventions: Bed/chair exit alarm, Communicate number of staff needed for ambulation/transfer, Mechanical lift, OT consult for ADLs, Patient to call before getting OOB, PT Consult for mobility concerns Mentation Interventions: Adequate sleep, hydration, pain control, Bed/chair exit alarm, Door open when patient unattended, Evaluate medications/consider consulting pharmacy, Increase mobility, More frequent rounding, Reorient patient, Room close to nurse's station, Self-releasing belt, Toileting rounds, Update white board Medication Interventions: Bed/chair exit alarm, Evaluate medications/consider consulting pharmacy, Patient to call before getting OOB, Teach patient to arise slowly Elimination Interventions: Bed/chair exit alarm, Call light in reach, Elevated toilet seat, Patient to call for help with toileting needs, Toilet paper/wipes in reach, Toileting schedule/hourly rounds

## 2018-10-22 NOTE — PROGRESS NOTES
Orders received, chart reviewed. Per discussion with CM, pt is dependent in ADLs and is non-ambulatory at baseline and receives assistance at the LTC facility he resides at. No PT needs identified at this time, will sign off for now.   
 
Rhoda Banda, SPT

## 2018-10-22 NOTE — PROGRESS NOTES
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 
WBC wnl, afebrile. UCx positive for gram negative rods and possibly another bacteria, >100k GNR. Continue broad spectrum abx with Zosyn for now. Keep NPO until cleared by speech. Hypomagnesemia - resolved Hypokalemia - resolved Hypophosphatemia - resolved Replaced, will monitor 10/20: 
UCx results noted - continue IV abx broad spectrum. Keep pt NPO till cleared by Speech. Hopeful that as pt's infection improves, he will regain his capacity to eat somewhat. Called pt's family as per the number in Demographics but was unable to reach anyone. Need to contact family as if pt is unable to tolerate PO then goals of care need to be discussed.  
 
10/21: 
Pt tolerating some PO today. REBECCA Chavez spoke with Robert and pt was eating Pureed there. Will attempt to do the same. Continue IV abx. Will follow BCx. WBC wnl now and remains afebrile. Likely for DC tmr. 10/22: 
Unfortunately pt not reliably tolerating PO. Speech has been following - plan for modified barium swallow tomorrow. Continue abx - course completion on the  Hypothyroidism Continue Synthyroid 112 mcg daily Hyperlipidemia Continue Lipitor 20 mg daily Change mental status most likely secondary to metabolic encephalopathy - back at baseline 
-Head CT w/o acute findings COPD Continue DuoNeb nebulizer as needed Hypertension Antihypertensive medication hydralazine as needed 
  
18.5 - 24.9 Normal weight / Body mass index is 24.22 kg/m². 
  
Code status: Full Prophylaxis: Lovenox Recommended Disposition: [place] Subjective: Chief Complaint / Reason for Physician Visit Not reliably tolerating PO  Discussed with RN events overnight. Review of Systems: 
Symptom Y/N Comments  Symptom Y/N Comments Fever/Chills    Chest Pain Poor Appetite    Edema Cough    Abdominal Pain Sputum    Joint Pain SOB/WEBSTER    Pruritis/Rash Nausea/vomit    Tolerating PT/OT Diarrhea    Tolerating Diet Constipation    Other Could NOT obtain due to: demented Objective: VITALS:  
Last 24hrs VS reviewed since prior progress note. Most recent are: 
Patient Vitals for the past 24 hrs: 
 Temp Pulse Resp BP SpO2  
10/22/18 1317    153/79   
10/22/18 1309  82  (!) 153/111   
10/22/18 1103 97.7 °F (36.5 °C) 99 18 (!) 151/107 95 % 10/22/18 0640 97.6 °F (36.4 °C) 73 20 153/84 95 % 10/22/18 0237 97.5 °F (36.4 °C) 75 20 169/86 97 % 10/21/18 2223 97.6 °F (36.4 °C) 62 20 148/67 98 % 10/21/18 1830 97.5 °F (36.4 °C) 80 16 137/77 98 % 10/21/18 1459 97.6 °F (36.4 °C) 74 20 113/53 99 % Intake/Output Summary (Last 24 hours) at 10/22/2018 1318 Last data filed at 10/22/2018 1317 Gross per 24 hour Intake 3258.75 ml Output 3525 ml Net -266.25 ml PHYSICAL EXAM: 
General: Awake, NAD, uncooperative EENT:  EOMI. Anicteric sclerae. MMM Resp:  CTA bilaterally, no wheezing or rales. No accessory muscle use CV:  Regular  rhythm,  No edema GI:  Soft, Non distended, Non tender.  +Bowel sounds Neurologic:  Alert and oriented X 0, demented Psych:   No insight Skin:  No rashes. No jaundice Reviewed most current lab test results and cultures  YES Reviewed most current radiology test results   YES Review and summation of old records today    NO Reviewed patient's current orders and MAR    YES 
PMH/SH reviewed - no change compared to H&P 
________________________________________________________________________ Care Plan discussed with: 
  Comments Patient x Family RN x Care Manager x Consultant Multidiciplinary team rounds were held today with , nursing, pharmacist and clinical coordinator. Patient's plan of care was discussed; medications were reviewed and discharge planning was addressed. ________________________________________________________________________ Total NON critical care TIME:  25   Minutes Total CRITICAL CARE TIME Spent:   Minutes non procedure based Comments >50% of visit spent in counseling and coordination of care    
________________________________________________________________________ Ad Schulz MD  
 
Procedures: see electronic medical records for all procedures/Xrays and details which were not copied into this note but were reviewed prior to creation of Plan. LABS: 
I reviewed today's most current labs and imaging studies. Pertinent labs include: 
Recent Labs 10/21/18 
0300 10/20/18 
0100 WBC 10.5 10.3 HGB 12.4 11.6* HCT 36.1* 34.1*  
 226 Recent Labs 10/21/18 
0300 10/20/18 
0100  137  
K 3.5 3.2*  
 104 CO2 19* 22 GLU 64* 63* BUN 7 7 CREA 0.65* 0.70 CA 8.4* 8.1*  
MG  --  2.0 PHOS  --  3.2 Signed: Ad Schulz MD

## 2018-10-22 NOTE — PROGRESS NOTES
Attending informed CM to plan for afternoon discharge. Cm informed Jackie at [facility]. WCB to discuss time of discharge. CM will request transport for 1400. [facility]. 
 
1120am 
Rounding - Nursing informed CM that patient is to have a modified barium swallow as recommended by Speech. 1240 pm 
CM confirmed with Nursing. Patient is to be scheduled for a modified barium swallow on 10/23/2018. CM contacted Ernesto Hummel at [facility] to inform of delay in discharge. CM will inform [facility]. Patient has Medicare part A only per [facility] at [facility] and will require Authorization from [facility] for transport. CM will continue to follow for discharge planning. Lawanda Reynolds RN CM Ext [phone]

## 2018-10-22 NOTE — PROGRESS NOTES
Bedside shift change report given to Winston Cherry RN (oncoming nurse). Report included the following information SBAR, Kardex, Intake/Output, MAR, Accordion and Recent Results. SHIFT SUMMARY: 
 
 
 
 
 
[facility] NURSING NOTE Admission Date 10/18/2018 Admission Diagnosis PNA (pneumonia) Consults None Cardiac Monitoring [x] Yes [] No  
  
Purposeful Hourly Rounding [x] Yes   
Brice Score Total Score: 4 Brice score 3 or > [x] Bed Alarm [] Avasys [] 1:1 sitter [] Patient refused (Signed refusal form in chart) Bill Score Bill Score: 15 Bill score 14 or < [x] PMT consult [] Wound Care consult  
 []  Specialty bed  [] Nutrition consult Influenza Vaccine Received Flu Vaccine for Current Season (usually Sept-March): Yes([facility] stated \"they beleive so\" no one availab) Oxygen needs? [x] Room air Oxygen @  []1L    []2L    []3L   []4L    []5L   []6L via NC Chronic home O2 use? [] Yes [x] No 
Perform O2 challenge test and document in progress note using [employer] (.Homeoxygen) Last bowel movement Last Bowel Movement Date: 10/21/18 Urinary Catheter Urinary Catheter 10/18/18 Mendoza-Indications for Use: Chronic urinary retention/bladder outlet obstruction Urinary Catheter 10/18/18 Mendoza-Urine Output (mL): 325 ml LDAs Peripheral IV 10/18/18 Left Forearm (Active) Site Assessment Clean, dry, & intact 10/22/2018  7:48 PM  
Phlebitis Assessment 0 10/22/2018  7:48 PM  
Infiltration Assessment 0 10/22/2018  7:48 PM  
Dressing Status Clean, dry, & intact; Clean 10/22/2018  7:48 PM  
Dressing Type Transparent 10/22/2018  7:48 PM  
Hub Color/Line Status Pink; Infusing 10/22/2018  7:48 PM  
   
Peripheral IV 10/19/18 Anterior;Left;Superior; Upper Arm (Active) Site Assessment Clean, dry, & intact 10/22/2018  7:48 PM  
Phlebitis Assessment 0 10/22/2018  7:48 PM  
Infiltration Assessment 0 10/22/2018  7:48 PM  
 Dressing Status Clean, dry, & intact 10/22/2018  7:48 PM  
Dressing Type Transparent 10/22/2018  7:48 PM  
Hub Color/Line Status Pink;Capped 10/22/2018  7:48 PM  
                  
  
Readmission Risk Assessment Tool Score Medium Risk 15 Total Score 3 Has Seen PCP in Last 6 Months (Yes=3, No=0) 2 . Living with Significant Other. Assisted Living. LTAC. SNF. or  
Rehab  
 9 Pt. Coverage (Medicare=5 , Medicaid, or Self-Pay=4) Criteria that do not apply:  
 Patient Length of Stay (>5 days = 3) IP Visits Last 12 Months (1-3=4, 4=9, >4=11) Charlson Comorbidity Score (Age + Comorbid Conditions) Expected Length of Stay 4d 21h Actual Length of Stay 4

## 2018-10-22 NOTE — PROGRESS NOTES
Problem: Dysphagia (Adult) Goal: *Acute Goals and Plan of Care (Insert Text) Speech pathology goals Initiated 10/18/2018 1. Patient will participate in re-evaluation of swallow function within 7 days. MET 2. Patient will tolerate puree/thin liquid diet with no overt s/s aspiration within 7 days 3. Patient will participate in MBS within 7 days Speech language pathology dysphagia treatment Patient: Masoud Browning [de-identified]82 y.o. male) Date: 10/22/2018 Diagnosis: PNA (pneumonia) <principal problem not specified> Precautions: swallow ASSESSMENT: 
Note patient was started on a puree/thin liquid diet over the weekend. Per chart review, RN called Robert who reported patient was eating puree diet PTA. Patient with improved alertness this date, however patient remains confused, oriented to person only. Patient with moderate oral and suspected mild-moderate pharyngeal dysphagia. Oral dysphagia is characterized by prolonged manipulation and delayed posterior propulsion. Full oral clearance noted. Pharyngeal dysphagia is characterized by delayed swallow initiation and decreased hyolaryngeal elevation/excursion. No overt s/s aspiration observed, however patient is at risk for aspiration (and specifically silent aspiration) secondary to COPD, RLL PNA, and dementia. May benefit from MBS to fully assess pharyngeal phase of swallow. Progression toward goals: 
[]         Improving appropriately and progressing toward goals [x]         Improving slowly and progressing toward goals 
[]         Not making progress toward goals and plan of care will be adjusted PLAN: 
Recommendations and Planned Interventions: 
--Continue puree/thin liquid diet 
--Meds crushed 
--SLP to follow for diet tolerance Patient continues to benefit from skilled intervention to address the above impairments. Continue treatment per established plan of care. Discharge Recommendations:  None SUBJECTIVE:  
 Patient stated Salena Sheikh me back down.  OBJECTIVE:  
Cognitive and Communication Status: 
Neurologic State: Alert, Confused Orientation Level: Oriented to person, Disoriented to time, Disoriented to situation, Disoriented to place Cognition: Decreased command following, Decreased attention/concentration Perception: Appears intact Perseveration: No perseveration noted Safety/Judgement: Decreased awareness of environment, Decreased awareness of need for assistance, Decreased awareness of need for safety, Decreased insight into deficits Dysphagia Treatment: 
Oral Assessment: 
Oral Assessment Oral Hygiene: thick mucous on hard/soft palates, removed with suction Gag Reflex: Reduced P.O. Trials: 
Patient Position: upright in bed, lying on right side Vocal quality prior to P.O.: No impairment Consistency Presented: Puree; Thin liquid How Presented: Self-fed/presented;Cup/sip;Straw;Successive swallows;SLP-fed/presented;Spoon Bolus Acceptance: No impairment Bolus Formation/Control: Impaired Type of Impairment: Delayed Propulsion: Delayed (# of seconds) Oral Residue: None Initiation of Swallow: Delayed (# of seconds) Laryngeal Elevation: Decreased Aspiration Signs/Symptoms: None Pharyngeal Phase Characteristics: Double swallow Pain: 
Pain Scale 1: Numeric (0 - 10) Pain Intensity 1: 0 After treatment:  
[]              Patient left in no apparent distress sitting up in chair 
[x]              Patient left in no apparent distress in bed 
[x]              Call bell left within reach [x]              Nursing notified 
[]              Caregiver present 
[]              Bed alarm activated COMMUNICATION/EDUCATION:  
The patients plan of care including recommendations, planned interventions, and recommended diet changes were discussed with: Registered Nurse. [x]              Posted safety precautions in patient's room. Selvin Huber SLP Time Calculation: 16 mins

## 2018-10-23 PROBLEM — R53.81 PHYSICAL DEBILITY: Status: ACTIVE | Noted: 2018-01-01

## 2018-10-23 PROBLEM — R13.11 ORAL PHASE DYSPHAGIA: Status: ACTIVE | Noted: 2018-01-01

## 2018-10-23 PROBLEM — F03.918 DEMENTIA WITH BEHAVIORAL DISTURBANCE: Status: ACTIVE | Noted: 2018-01-01

## 2018-10-23 PROBLEM — Z71.89 COUNSELING REGARDING ADVANCED CARE PLANNING AND GOALS OF CARE: Status: ACTIVE | Noted: 2018-01-01

## 2018-10-23 NOTE — PROGRESS NOTES
Nutrition Assessment: 
 
INTERVENTIONS/RECOMMENDATIONS:  
Continue current diet Ensure TID ASSESSMENT:  
Chart reviewed. Pt had MBS today, SLP recommending pureed with thin liquids. Per flowsheet documentation pt is consuming 25-50% of meals. Will add nutrition supplements to better meet pt nutrition needs. Diet Order: Puree 
% Eaten:   
Patient Vitals for the past 72 hrs: 
 % Diet Eaten 10/23/18 1002 30 % 10/22/18 1808 50 % 10/21/18 1828 90 % 10/21/18 1506 50 % Pertinent Medications: [x]Reviewed: NS w/ KCl Pertinent Labs: [x]Reviewed:  
Food Allergies: [x]NKFA  []Other Last BM:  10/21 Edema:      []RUE   []LUE   []RLE   []LLE Pressure Ulcer:      [] Stage I   [] Stage II   [] Stage III   [] Stage IV Anthropometrics: Height: 6' (182.9 cm) Weight: 81 kg (178 lb 9.2 oz) IBW (%IBW):   ( ) UBW (%UBW):   (  %) BMI: Body mass index is 24.22 kg/m². This BMI is indicative of: 
[]Underweight   [x]Normal   []Overweight   [] Obesity   [] Extreme Obesity (BMI>40) Last Weight Metrics: 
Weight Loss Metrics 10/18/2018 Today's Wt 178 lb 9.2 oz  
BMI 24.22 kg/m2 Estimated Nutrition Needs (Based on): 7253 Kcals/day(BMR: 1542 x 1.2) , 80 g(1 g/kg) Protein Carbohydrate: At Least 130 g/day  Fluids: 1850 mL/day or per primary team 
 
NUTRITION DIAGNOSES:  
Problem:  Swallowing difficulty Etiology: related to dysphagia Signs/Symptoms: as evidenced by SLP eval   
Previous Nutrition Dx:  [] Resolved  [] Unresolved           [] Progressing NUTRITION INTERVENTIONS: 
Meals/Snacks: General/healthful diet   Supplements: Commercial supplement GOAL:  
consume >50% of meals and ONS in 2-4 days NUTRITION MONITORING AND EVALUATION Food/Nutrient Intake Outcomes: Total energy intake Physical Signs/Symptoms Outcomes: Weight/weight change, Electrolyte and renal profile, GI 
 
Previous Goal Met: 
 [] Met              [x] Progressing Towards Goal              [] Not Progressing Towards Goal  
Previous Recommendations: 
 [] Implemented          [] Not Implemented          [x] Not Applicable LEARNING NEEDS (Diet, Food/Nutrient-Drug Interaction):  
 [x] None Identified 
 [] Identified and Education Provided/Documented 
 [] Identified and Pt declined/was not appropriate Cultural, Religion, OR Ethnic Dietary Needs:  
 [x] None Identified 
 [] Identified and Addressed 
 
 [x] Interdisciplinary Care Plan Reviewed/Documented  
 [x] Discharge Planning: continue pureed diet 
 [] Participated in Interdisciplinary Rounds NUTRITION RISK:  
 [x] High       to       [x] Moderate           []  Low  []  Minimal/Uncompromised Machelle Christopher RDN Pager [phone] Weekend Pager [phone]

## 2018-10-23 NOTE — PROGRESS NOTES
Problem: Dysphagia (Adult) Goal: *Acute Goals and Plan of Care (Insert Text) Speech pathology goals Initiated 10/18/2018 1. Patient will participate in re-evaluation of swallow function within 7 days. MET 2. Patient will tolerate puree/thin liquid diet with no overt s/s aspiration within 7 days. MET 3. Patient will participate in MBS within 7 days. MET Speech Pathology Modified barium swallow Study/discharge Patient: Emory Graner [de-identified]82 y.o. male) Date: 10/23/2018 Primary Diagnosis: PNA (pneumonia) Precautions: swallow ASSESSMENT : 
Based on the objective data described below, the patient presents with moderate-severe oral dysphagia characterized by prolonged oral prep, premature spillage, and delayed/discoordinated posterior propulsion. With second bite of puree, patient only propelled bolus to posterior tongue, then opened his mouth and picked at his upper teeth without swallowing. Patient was not receptive to verbal cueing and required liquid wash to propel and swallow second pureed bolus. Solids not trialed due to time required for puree and safety concerns. Patient also with mild-moderate pharyngeal dysphagia characterized by mildly delayed swallow initiation at the level of the vallecula with spillage to the pyriform sinuses. Patient with decreased laryngeal elevation/closure, pharyngeal stripping wave, and decreased tongue base retraction resulting in moderate vallecular residue with puree that cleared with a cued liquid wash. Patient with intermittent penetration of thin liquids during the swallow secondary to reduced laryngeal elevation/closure, however penetration cleared with the force of the swallow. No aspiration observed, however patient will always be at risk for aspiration secondary to cognitive status and dementia. Skilled therapy provided by a speech-language pathologist is not indicated at this time. PLAN : 
Recommendations and Planned Interventions: --Puree/thin liquid diet 
--Straws ok 
--Supervision with PO intake with cues to alternate solids/liquids --Strict aspiration precautions, including small/single bites/sips and upright for all PO intake Discharge Recommendations: None SUBJECTIVE:  
Patient stated Ok OBJECTIVE:  
 
Past Medical History:  
Diagnosis Date  Cancer ([facility] [address]  Chronic obstructive pulmonary disease ([facility] [address]  Dementia  Hypertension No past surgical history on file. Prior Level of Function/Home Situation:  
Home Situation Home Environment: Skilled nursing facility One/Two Story Residence: Other (Comment) Living Alone: No 
Support Systems: Skilled nursing facility Patient Expects to be Discharged to[de-identified] Skilled nursing facility Current DME Used/Available at Home: Other (comment)(unsure) Diet prior to admission: puree/thin per chart review, despite NH documentation stating regular/thin Current Diet:  Puree/thin Radiologist: Dr. Fermín Taylor Film Views: Lateral;Fluoro Patient Position: upright in chair Trial 1:  
Consistency Presented: Thin liquid;Puree(safety concern with solid) How Presented: Self-fed/presented;Straw;SLP-fed/presented;Spoon Bolus Acceptance: No impairment Bolus Formation/Control: Impaired: Delayed;Poor;Premature spillage Propulsion: Delayed (# of seconds); Discoordination;Absent Oral Residue: Lingual  
Initiation of Swallow: Triggered at pyriform sinus(es) Timing: Pooling 1-5 sec Penetration: Flash/transient;During swallow Aspiration/Timing: No evidence of aspiration Pharyngeal Clearance: Vallecular residue;Greater than 50% Attempted Modifications: Alternate liquids/solids Effective Modifications: Alternate liquids/solids Cues for Modifications: Minimal  
   
 
 
Decreased Tongue Base Retraction?: Yes Laryngeal Elevation: Incomplete laryngeal closure; Reduced excursion with laryngeal vestibule gap Aspiration/Penetration Score: 2 (Penetration/No residue-Contrast enters the airway penetrates, remains above the folds/cords, and is cleared) Pharyngeal Symmetry: Not assessed Pharyngeal-Esophageal Segment: No impairment Pharyngeal Dysfunction: Decreased tongue base retraction;Decreased strength;Decreased elevation/closure;Decreased pharyngeal wall constriction Oral Phase Severity: Moderate-severe Pharyngeal Phase Severity: Mild moderate NOMS:  
The NOMS functional outcome measure was used to quantify this patient's level of swallowing impairment. Based on the NOMS, the patient was determined to be at level 3 for swallow function G Codes: In compliance with CMSs Claims Based Outcome Reporting, the following G-code set was chosen for this patient based the use of the NOMS functional outcome to quantify this patient's level of swallowing impairment. Using the NOMS, the patient was determined to be at level 3 for swallow function which correlates with the CL= 60-79% level of severity. Based on the objective assessment provided within this note, the current, goal, and discharge g-codes are as follows: 
 
Swallow  Swallowing: 
 Swallow Current Status CL= 60-79%  Swallow Goal Status CL= 60-79%  Swallow D/C Status CL= 60-79% NOMS Swallowing Levels: 
Level 1 (CN): NPO Level 2 (CM): NPO but takes consistency in therapy Level 3 (CL): Takes less than 50% of nutrition p.o. and continues with nonoral feedings; and/or safe with mod cues; and/or max diet restriction Level 4 (CK): Safe swallow but needs mod cues; and/or mod diet restriction; and/or still requires some nonoral feeding/supplements Level 5 (CJ): Safe swallow with min diet restriction; and/or needs min cues Level 6 (CI): Independent with p.o.; rare cues; usually self cues; may need to avoid some foods or needs extra time Level 7 (CH): Independent for all p.o. RUBEN. (2003). National Outcomes Measurement System (NOMS): Adult Speech-Language Pathology User's Guide. COMMUNICATION/EDUCATION:  
 
The patients plan of care including findings from MBS, recommendations, and recommended diet changes were discussed with: Registered Nurse. 
 
[]   Patient/family have participated as able and agree with findings and recommendations. [x]   Patient is unable to participate in plan of care at this time. Thank you for this referral. 
Lorne Garner, SLP Time Calculation: 30 mins

## 2018-10-23 NOTE — PROGRESS NOTES
Keshav Sims, [address] Coordinator at [facility] reached out to [facility]. [facility] is [facility] and [address] [place] [address]. Requested the following to be faxed to: 
[phone] Facesheet H&P Most recent clinical notes Discharge Summary when available. All but discharge summary faxed. [facility] from [facility] called to request CM to refax materials. Not received. Task completeld. Marga Ambrocio RN CM Ext [phone]

## 2018-10-23 NOTE — PROGRESS NOTES
Hospitalist Progress Note NAME: Zak Harris :  1935 MRN:  [id number] Assessment / Plan: 
Acute encephalopathy due to UTI, present on admission: not clinically felt to have PNA 
- CT head with no acute findings seen - CXR 10/18 with no focal consolidation. Diffuse increased interstitial markings possibly chronic.  
- CT A/P 10/18 with COPD, right lung base infiltrate. Abdominal aortic aneurysm. Fecal stasis. Bladder wall thickening with prostate hypertrophy. - blood cultures no growth. Urine culture >[id number] klebsiella + morganella 
- con't rocephin (initially on unasyn) - palliative care consult appreciated, working with mPOA on POST form Dysphagia: 
- MBS today with some delay in the oral phase and significant residue in the valleculae and piriform sinuses. Flash penetration was seen for water consistency but there was no aspiration. - puree diet per speech Hypothyroidism: con't synthroid Hyperlipidemia: con't lipitor Dementia: restart outpatient zoloft COPD without acute exacerbation: con't duoneb prn Hypomagnesemia, hypokalemia and hypophosphatemia: resolved 
  
Code status: Full - mPOA working with Palliative as above (Step-daughter Michel Betancourt [phone] is mPOA) Prophylaxis: Lovenox Subjective: Chief Complaint / Reason for Physician Visit Not very responsive, does not answer questions. Discussed with RN events overnight. Review of Systems: 
Symptom Y/N Comments  Symptom Y/N Comments Fever/Chills    Chest Pain Poor Appetite    Edema Cough    Abdominal Pain Sputum    Joint Pain SOB/WEBSTER    Pruritis/Rash Nausea/vomit    Tolerating PT/OT Diarrhea    Tolerating Diet Constipation    Other Could NOT obtain due to: Dementia, encephalopathy Objective: VITALS:  
Last 24hrs VS reviewed since prior progress note. Most recent are: 
Patient Vitals for the past 24 hrs: 
 Temp Pulse Resp BP SpO2 10/23/18 0744 97.8 °F (36.6 °C) 84 16 (!) 158/92 97 % 10/23/18 0313 98.1 °F (36.7 °C) 81 18 136/62 97 % 10/22/18 2325 98.3 °F (36.8 °C) 85 18 148/67 98 % 10/22/18 1909 97.4 °F (36.3 °C) 87 18 123/72 98 % 10/22/18 1505 97.8 °F (36.6 °C) 94 18 127/90 100 % 10/22/18 1317    153/79   
10/22/18 1309  82  (!) 153/111   
10/22/18 1103 97.7 °F (36.5 °C) 99 18 (!) 151/107 95 % Intake/Output Summary (Last 24 hours) at 10/23/2018 1053 Last data filed at 10/23/2018 1002 Gross per 24 hour Intake 3166.25 ml Output 3075 ml Net 91.25 ml PHYSICAL EXAM: 
General: WD, WN. Briefly alert, not cooperative, no acute distress   
EENT:  EOMI. Anicteric sclerae. MMM Resp:  CTA bilaterally, no wheezing or rales. No accessory muscle use CV:  Regular rhythm,  No edema GI:  Soft, mildly distended, Non tender.  +Bowel sounds Neurologic:  Oriented X 0-1, no speech, Psych:   Limited insight. Not anxious nor agitated Skin:  No rashes. No jaundice Reviewed most current lab test results and cultures  YES Reviewed most current radiology test results   YES Review and summation of old records today    NO Reviewed patient's current orders and MAR    YES 
PMH/ reviewed - no change compared to H&P 
________________________________________________________________________ Care Plan discussed with: 
  Comments Patient x Family RN x Care Manager Consultant  x Palliative Multidiciplinary team rounds were held today with , nursing, pharmacist and clinical coordinator. Patient's plan of care was discussed; medications were reviewed and discharge planning was addressed. ________________________________________________________________________ Total NON critical care TIME:  30 Minutes Total CRITICAL CARE TIME Spent:   Minutes non procedure based Comments >50% of visit spent in counseling and coordination of care x ________________________________________________________________________ Li Kunz MD  
 
Procedures: see electronic medical records for all procedures/Xrays and details which were not copied into this note but were reviewed prior to creation of Plan. LABS: 
I reviewed today's most current labs and imaging studies. Pertinent labs include: 
Recent Labs 10/21/18 
0300 WBC 10.5 HGB 12.4 HCT 36.1*  
 Recent Labs 10/21/18 
0300   
K 3.5  CO2 19* GLU 64* BUN 7  
CREA 0.65* CA 8.4* Signed: Li Kunz MD

## 2018-10-23 NOTE — CONSULTS
Palliative Medicine Consult Ethan: [phone]) Patient Name: Liam Helay YOB: 1935 Date of Initial Consult: 10/23/18 Reason for Consult: care decisions Requesting Provider: Cara Campbell  
Primary Care Physician: Armida, MD Abbey 
 
 SUMMARY:  
Liam Healy is a 82 y.o. with a past history of dementia, COPD, HTN, CA, who was admitted on 10/18/2018 from [facility] with a diagnosis of PNA. Current medical issues leading to Palliative Medicine involvement include: frequent falls, dementia, history of cancer (admission diagnosis to [place] is Malignant Neoplasm. Psychosocial: resides in [address] [place] [address] x 5 weeks, total care, does not ambulate. Step-daughter Latanya Grijalva [phone] is mPOA. Wife Joycelyn Roldan, step-daughter Aleida Storey. PALLIATIVE DIAGNOSES:  
1. Nausea and vomiting 2. Delirium 3. UTI with indwelling mike 4. PNA right lung 5. Dysphagia 6. Dementia 7. Physical debility 8. Care decisions PLAN:  
1. Spoke with step-daughter/mPOA Dash Flor: pt has 2 children whom he does not have a relationship with, then remarried to Nilam Stone and has a relationship with his 2 step-daughters Dash Flor and Sydney Traci. Dash Flor has an AMD which she will send me, which names her as Lewis. Pt was living with Nilam Stone until 5 weeks ago and getting his medical care at the [place]. He was supposed to be w/c bound 6-8 months ago, but insisted on using a walker which resulted in many falls. He was also offered but refused home services because he wanted Nilam Stone to do everything for him, however, Nilam Stone is 95 lbs and frail herself and was unable to continue providing the level of care he needed at home. Pt was difficult to care for and still is, reporting that Ignacio Morris to keep him medicated because he falls out of bed and w/c due to his restlessness. \"  They tried to have conversations with pt about what his wishes are, however, pt kept saying \"I don't know. \" Dash Flor believes pt's QOL has declined significantly. Kostas Cabello is interested in looking at the POST form and will call me to schedule to meet towards the end of the week. 2. Discussed with  patient is usually on Zoloft 50mg daily. 3. Will continue psychosocial support this admission. 4. Initial consult note routed to primary continuity provider 5. Communicated plan of care with: Palliative IDT, RN 
 
 
 GOALS OF CARE / TREATMENT PREFERENCES:  
 
GOALS OF CARE: 
Patient/Health Care Proxy Stated Goals: Deloras Parham. Continue current level of care TREATMENT PREFERENCES:  
Code Status: Full Code Advance Care Planning: 
Advance Care Planning 10/18/2018 Patient's Healthcare Decision Maker is: Unable to obtain Confirm Advance Directive None Medical Interventions: Full interventions Other Instructions: Other: As far as possible, the palliative care team has discussed with patient / health care proxy about goals of care / treatment preferences for patient. HISTORY:  
 
History obtained from: chart, daughter Kostas Cabello CHIEF COMPLAINT: abdominal pain HPI/SUBJECTIVE: The patient is:  
[] Verbal and participatory [x] Non-participatory due to: confusion 10/18: BIBA with c/o upper abdominal pain x few days. WMS report pt had 2 episodes of dark emesis PTA, and pt is more altered than baseline. ED W/U: 
EXAM: ill appearing, elderly, moderate distress, mumbling, w/d from pain, abd tender, mike draining turbid fluid with chunks, multiple abrasions to BLE shins, toes and feet LAB: UA consistent with UTI, Na 133, IMAGING:  abd CT COPD right lung base infiltrate, abdominal aortic aneurysm, fecal stasis, bladder wall thickening with prostate hypertrophy. CXR: interstitial disease possibly chronic. HOSPITAL COURSE: admitted to [facility] for progression of care. 10/23: awake, alert, confused, does not know where we are.   States wife does not visit very often and cannot care for him anymore. Denies pain. He reports he was in [employer] x 4 years, saw combat in [facility], then was a .  States he has 2 sons but could not name them. Cornelio Cornell reports pt has 2 daughters and 2 step-daughters). Clinical Pain Assessment (nonverbal scale for severity on nonverbal patients):  
Clinical Pain Assessment Severity: 0 Activity (Movement): Lying quietly, normal position Duration: for how long has pt been experiencing pain (e.g., 2 days, 1 month, years) Frequency: how often pain is an issue (e.g., several times per day, once every few days, constant) FUNCTIONAL ASSESSMENT:  
 
Palliative Performance Scale (PPS): PPS: 30 
 
 
 PSYCHOSOCIAL/SPIRITUAL SCREENING:  
 
Palliative IDT has assessed this patient for cultural preferences / practices and a referral made as appropriate to needs (Cultural Services, Patient Advocacy, Ethics, etc.) Advance Care Planning: 
Advance Care Planning 10/18/2018 Patient's Healthcare Decision Maker is: Unable to obtain Confirm Advance Directive None Any spiritual / Jainism concerns: 
[] Yes /  [x] No 
 
Caregiver Burnout: 
[] Yes /  [x] No /  [] No Caregiver Present Anticipatory grief assessment:  
[x] Normal  / [] Maladaptive ESAS Anxiety: Anxiety: 4(fidgity ) ESAS Depression: Depression: 3 REVIEW OF SYSTEMS:  
 
Positive and pertinent negative findings in ROS are noted above in HPI. The following systems were [x] reviewed / [] unable to be reviewed as noted in HPI Other findings are noted below. Systems: constitutional, ears/nose/mouth/throat, respiratory, gastrointestinal, genitourinary, musculoskeletal, integumentary, neurologic, psychiatric, endocrine. Positive findings noted below. Modified ESAS Completed by: provider Fatigue: 5 Drowsiness: 0 Depression: 3 Pain: 0 Anxiety: 4(fidgity ) Nausea: 0 Anorexia: 6 Dyspnea: 0 Constipation: Yes Stool Occurrence(s): 1 PHYSICAL EXAM:  
 
From RN flowsheet: 
Wt Readings from Last 3 Encounters:  
10/18/18 178 lb 9.2 oz (81 kg) Blood pressure 120/70, pulse 89, temperature 97.7 °F (36.5 °C), resp. rate 18, height 6' (1.829 m), weight 178 lb 9.2 oz (81 kg), SpO2 98 %. Pain Scale 1: Numeric (0 - 10) Pain Intensity 1: 0 Pain Onset 1: acute Pain Location 1: Back Pain Orientation 1: Lower Pain Description 1: Aching Pain Intervention(s) 1: Medication (see MAR) Last bowel movement, if known:  
 
Constitutional: thin, lying in bed, awake, alert, disoriented Eyes: pupils equal, anicteric ENMT: no nasal discharge, moist mucous membranes Cardiovascular: regular rhythm, distal pulses intact Respiratory: breathing not labored, symmetric Gastrointestinal: soft non-tender, +bowel sounds Musculoskeletal: no deformity, no tenderness to palpation Skin: warm, dry Neurologic: following commands, moving all extremities Psychiatric: full affect, very restless HISTORY:  
 
Active Problems: 
  PNA (pneumonia) (10/18/2018) Past Medical History:  
Diagnosis Date  Cancer ([facility] [address]  Chronic obstructive pulmonary disease ([facility] [address]  Dementia  Hypertension No past surgical history on file. No family history on file. History reviewed, no pertinent family history. Social History Tobacco Use  Smoking status: Former Smoker  Smokeless tobacco: Never Used Substance Use Topics  Alcohol use: No  
  Frequency: Never No Known Allergies Current Facility-Administered Medications Medication Dose Route Frequency  cefTRIAXone (ROCEPHIN) 1 g in 0.9% sodium chloride (MBP/ADV) 50 mL  1 g IntraVENous Q24H  
 acetaminophen (TYLENOL) tablet 650 mg  650 mg Oral Q6H PRN  
 sodium chloride (NS) flush 5-10 mL  5-10 mL IntraVENous PRN  
 0.9% sodium chloride with KCl 20 mEq/L infusion   IntraVENous CONTINUOUS  
 atorvastatin (LIPITOR) tablet 20 mg  20 mg Oral DAILY  aspirin delayed-release tablet 81 mg  81 mg Oral DAILY  levothyroxine (SYNTHROID) tablet 112 mcg  112 mcg Oral ACB  sodium chloride (NS) flush 5-10 mL  5-10 mL IntraVENous Q8H  
 sodium chloride (NS) flush 5-10 mL  5-10 mL IntraVENous PRN  
 enoxaparin (LOVENOX) injection 40 mg  40 mg SubCUTAneous Q24H  
 albuterol-ipratropium (DUO-NEB) 2.5 MG-0.5 MG/3 ML  3 mL Nebulization Q2H PRN  
 
 
 
 LAB AND IMAGING FINDINGS:  
 
Lab Results Component Value Date/Time WBC 10.5 10/21/2018 03:00 AM  
 HGB 12.4 10/21/2018 03:00 AM  
 PLATELET 439 56/22/0589 03:00 AM  
 
Lab Results Component Value Date/Time Sodium 137 10/21/2018 03:00 AM  
 Potassium 3.5 10/21/2018 03:00 AM  
 Chloride 105 10/21/2018 03:00 AM  
 CO2 19 (L) 10/21/2018 03:00 AM  
 BUN 7 10/21/2018 03:00 AM  
 Creatinine 0.65 (L) 10/21/2018 03:00 AM  
 Calcium 8.4 (L) 10/21/2018 03:00 AM  
 Magnesium 2.0 10/20/2018 01:00 AM  
 Phosphorus 3.2 10/20/2018 01:00 AM  
  
Lab Results Component Value Date/Time AST (SGOT) 37 10/18/2018 02:09 AM  
 Alk. phosphatase 88 10/18/2018 02:09 AM  
 Protein, total 7.3 10/18/2018 02:09 AM  
 Albumin 2.6 (L) 10/18/2018 02:09 AM  
 Globulin 4.7 (H) 10/18/2018 02:09 AM  
 
No results found for: INR, PTMR, PTP, PT1, PT2, APTT No results found for: IRON, FE, TIBC, IBCT, PSAT, FERR No results found for: PH, PCO2, PO2 No components found for: [place] No results found for: CPK, CKMB Total time: 80 
Counseling / coordination time, spent as noted above: 65 
> 50% counseling / coordination?: y 
 
Prolonged service was provided for  []30 min   []75 min in face to face time in the presence of the patient, spent as noted above. Time Start:  
Time End:  
Note: this can only be billed with 38052 (initial) or 79050 (follow up). If multiple start / stop times, list each separately.

## 2018-10-23 NOTE — PROGRESS NOTES
Problem: Pressure Injury - Risk of 
Goal: *Prevention of pressure injury Document Bill Scale and appropriate interventions in the flowsheet. Outcome: Progressing Towards Goal 
Pressure Injury Interventions: 
Sensory Interventions: Assess changes in LOC, Assess need for specialty bed, Avoid rigorous massage over bony prominences, Chair cushion, Check visual cues for pain, Discuss PT/OT consult with provider, Float heels Moisture Interventions: Absorbent underpads, Apply protective barrier, creams and emollients, Assess need for specialty bed, Check for incontinence Q2 hours and as needed, Contain wound drainage, Internal/External fecal devices, Internal/External urinary devices, Limit adult briefs Activity Interventions: Assess need for specialty bed, Chair cushion, Increase time out of bed, Pressure redistribution bed/mattress(bed type), PT/OT evaluation Mobility Interventions: Chair cushion, Float heels, HOB 30 degrees or less, Pressure redistribution bed/mattress (bed type), PT/OT evaluation, Suspension boots Nutrition Interventions: Document food/fluid/supplement intake, Discuss nutritional consult with provider, Offer support with meals,snacks and hydration Friction and Shear Interventions: Apply protective barrier, creams and emollients, Feet elevated on foot rest, Foam dressings/transparent film/skin sealants, HOB 30 degrees or less, Lift sheet, Lift team/patient mobility team, Minimize layers, Sit at 90-degree angle

## 2018-10-23 NOTE — PROGRESS NOTES
Problem: Pressure Injury - Risk of 
Goal: *Prevention of pressure injury Document Bill Scale and appropriate interventions in the flowsheet. Outcome: Progressing Towards Goal 
Pressure Injury Interventions: 
Sensory Interventions: Assess changes in LOC Moisture Interventions: Absorbent underpads Activity Interventions: Increase time out of bed, PT/OT evaluation Mobility Interventions: PT/OT evaluation Nutrition Interventions: Document food/fluid/supplement intake Friction and Shear Interventions: Apply protective barrier, creams and emollients, Minimize layers Problem: Falls - Risk of 
Goal: *Absence of Falls Document Loyda Cherry Fall Risk and appropriate interventions in the flowsheet. Outcome: Progressing Towards Goal 
Fall Risk Interventions: 
Mobility Interventions: Bed/chair exit alarm, Patient to call before getting OOB Mentation Interventions: Adequate sleep, hydration, pain control, Bed/chair exit alarm Medication Interventions: Bed/chair exit alarm, Patient to call before getting OOB Elimination Interventions: Bed/chair exit alarm, Call light in reach

## 2018-10-23 NOTE — INTERDISCIPLINARY ROUNDS
Cardiopulmonary Care Interdisciplinary Rounds were held today to discuss patient's plan of care and outcomes. The following members were present: NP/Physician, Pharmacy, Nursing and Case Management. Expected Length of Stay:  4d 21h Plan of Care: Continue current treatment plan LTC at [facility]

## 2018-10-23 NOTE — CDMP QUERY
Account Number: [de-identified] MRN: [id number] Patient: Fabien Valdivia Created: [id number] Clinician Name: Donna Jackson RN, CCDS Dr. Bren Pratt : 
Please specify the type of pneumonia  this patient is being treated/managed for:  
 
=> Possible Aspiration Pneumonia in the setting of COPD, dementia & RLL pneumonia 
=> HCAP in the setting of NH resident & dementia 
=> Other explanation of clinical findings 
=> Clinically Undetermined (no explanation for clinical findings) The medical record reflects the following clinical findings, treatment, and risk factors. Risk Factors:  age, dementia, COPD, mild - moderate dysphagia w/ vomiting, lethargy Clinical Indicators:  RLL pneumonia Treatment: IV Zosyn, MBS to rule out silent aspiration, speech consult & eval, puree/thin liquid diet. Please clarify and document your clinical opinion in the progress notes and discharge summary including the definitive and/or presumptive diagnosis, (suspected or probable), related to the above clinical findings. Please include clinical findings supporting your diagnosis.  
Thank Evangelist Abdul RN, CCDS

## 2018-10-23 NOTE — PROGRESS NOTES
Bedside shift change report given to Emilee Hickey RN (oncoming nurse). Report included the following information SBAR, Kardex, ED Summary, Intake/Output, MAR and Recent Results. SHIFT SUMMARY: 
 
0700: Bedside shift change report given to Nat Landry RN (oncoming nurse) by Emilee Hickey RN (offgoing nurse). Report included the following information SBAR, Kardex, ED Summary, Intake/Output, MAR and Recent Results. 1300: Pt off floor for modified barium swallow [address] NURSING NOTE Admission Date 10/18/2018 Admission Diagnosis PNA (pneumonia) Consults IP CONSULT TO PALLIATIVE CARE - PROVIDER Cardiac Monitoring [x] Yes [] No  
  
Purposeful Hourly Rounding [x] Yes   
Brice Score Total Score: 4 Brice score 3 or > [x] Bed Alarm [] Avasys [] 1:1 sitter [] Patient refused (Signed refusal form in chart) Bill Score Bill Score: 15 Bill score 14 or < [] PMT consult [] Wound Care consult  
 []  Specialty bed  [] Nutrition consult Influenza Vaccine Received Flu Vaccine for Current Season (usually Sept-March): Yes([facility] stated \"they beleive so\" no one availab) Oxygen needs? [x] Room air Oxygen @  []1L    []2L    []3L   []4L    []5L   []6L via NC Chronic home O2 use? [] Yes [] No 
Perform O2 challenge test and document in progress note using smartphrase (.Homeoxygen) Last bowel movement Last Bowel Movement Date: 10/21/18 Urinary Catheter Urinary Catheter 10/18/18 Mendoza-Indications for Use: Chronic urinary retention/bladder outlet obstruction Urinary Catheter 10/18/18 Mendoza-Urine Output (mL): 1000 ml LDAs Peripheral IV 10/18/18 Left Forearm (Active) Site Assessment Clean, dry, & intact 10/23/2018  2:50 PM  
Phlebitis Assessment 0 10/23/2018  2:50 PM  
Infiltration Assessment 0 10/23/2018  2:50 PM  
Dressing Status Clean, dry, & intact 10/23/2018  2:50 PM  
Dressing Type Transparent;Tape 10/23/2018  2:50 PM  
 Hub Color/Line Status Pink;Patent 10/23/2018  2:50 PM  
   
Peripheral IV 10/19/18 Anterior;Left;Superior; Upper Arm (Active) Site Assessment Clean, dry, & intact 10/23/2018  2:50 PM  
Phlebitis Assessment 0 10/23/2018  2:50 PM  
Infiltration Assessment 0 10/23/2018  2:50 PM  
Dressing Status Clean, dry, & intact 10/23/2018  2:50 PM  
Dressing Type Transparent;Tape 10/23/2018  2:50 PM  
Hub Color/Line Status Pink;Patent 10/23/2018  2:50 PM  
                  
  
Readmission Risk Assessment Tool Score High Risk   
      
 22 Total Score 3 Has Seen PCP in Last 6 Months (Yes=3, No=0) 2 . Living with Significant Other. Assisted Living. LTAC. SNF. or  
Rehab  
 3 Patient Length of Stay (>5 days = 3)  
 9 Pt. Coverage (Medicare=5 , Medicaid, or Self-Pay=4) 5 Charlson Comorbidity Score (Age + Comorbid Conditions) Criteria that do not apply:  
 IP Visits Last 12 Months (1-3=4, 4=9, >4=11) Expected Length of Stay 4d 21h Actual Length of Stay 5

## 2018-10-23 NOTE — PROGRESS NOTES
Spiritual Care Assessment/Progress Note [address] 
 
 
NAME: Nael Bess      MRN: [id number] AGE: 82 y.o. SEX: male Mu-ism Affiliation:   
Language: English  
 
10/23/2018     Total Time (in minutes): 12 Spiritual Assessment begun in [facility] CARDIOPULMONARY CARE through conversation with: 
  
    [x]Patient        [] Family    [] Friend(s) Reason for Consult: Palliative Care, Initial/Spiritual Assessment Spiritual beliefs: (Please include comment if needed) 
   [] Identifies with a claudia tradition:     
   [] Supported by a claudia community:        
   [] Claims no spiritual orientation:       
   [] Seeking spiritual identity:            
   [] Adheres to an individual form of spirituality:       
   [x] Not able to assess:                   
 
    
Identified resources for coping:  
   [] Prayer                           
   [] Music                  [] Guided Imagery 
   [] Family/friends                 [] Pet visits [] Devotional reading                         [x] Unknown 
   [] Other:                                          
 
 
Interventions offered during this visit: (See comments for more details) Plan of Care: 
 
 [] Support spiritual and/or cultural needs  
 [] Support AMD and/or advance care planning process    
 [] Support grieving process 
 [] Coordinate Rites and/or Rituals  
 [] Coordination with community clergy 
 [x] No spiritual needs identified at this time 
 [] Detailed Plan of Care below (See Comments)  [] Make referral to Music Therapy 
[] Make referral to Pet Therapy    
[] Make referral to Addiction services 
[] Make referral to [facility] 
[] Make referral to Spiritual Care Partner 
[] No future visits requested       
[x] Follow up visits as needed Comments: The patient was in bed with a sitter at beside. The patient's ability to communicate is quite limited.  The patient responded to my voice and answered some questions accurately. The patient did not demonstrate that he is capable of  understanding his medical challenges. [address] EdD, MDiv For Janine Page [phone])

## 2018-10-24 NOTE — PROGRESS NOTES
Problem: Pressure Injury - Risk of 
Goal: *Prevention of pressure injury Document Bill Scale and appropriate interventions in the flowsheet. Outcome: Progressing Towards Goal 
Pressure Injury Interventions: 
Sensory Interventions: Assess changes in LOC, Discuss PT/OT consult with provider Moisture Interventions: Absorbent underpads, Minimize layers Activity Interventions: Assess need for specialty bed Mobility Interventions: PT/OT evaluation Nutrition Interventions: Document food/fluid/supplement intake Friction and Shear Interventions: Apply protective barrier, creams and emollients Problem: Falls - Risk of 
Goal: *Absence of Falls Document Joce Hugo Fall Risk and appropriate interventions in the flowsheet. Outcome: Progressing Towards Goal 
Fall Risk Interventions: 
Mobility Interventions: Bed/chair exit alarm, Patient to call before getting OOB Mentation Interventions: Adequate sleep, hydration, pain control, Bed/chair exit alarm Medication Interventions: Patient to call before getting OOB Elimination Interventions: Bed/chair exit alarm, Call light in reach

## 2018-10-24 NOTE — CONSULTS
Palliative Medicine Called Kaylynn Cantu, pt's mPOA, to discuss goals of care, however, she did not answer her phone and her mailbox is full. Will try again later today.   
 
Bren Barron, MSN, FNP-C, [facility]

## 2018-10-24 NOTE — PROGRESS NOTES
0700: Bedside shift change report given to Mike England RN (oncoming nurse) by Elsa Moreno RN (offgoing nurse). Report included the following information SBAR, Kardex, ED Summary, Intake/Output, MAR and Recent Results. 1127: TRANSFER - OUT REPORT: 
Verbal report given to Ceferino Jaramillo RN on [facility]  being transferred to [facility] for routine progression of care Report consisted of patients Situation, Background, Assessment and  
Recommendations(SBAR). Information from the following report(s) SBAR, Kardex, ED Summary, Intake/Output, MAR and Recent Results was reviewed with the receiving nurse. Lines:  
Peripheral IV 10/18/18 Left Forearm (Active) Site Assessment Clean, dry, & intact 10/24/2018  7:37 AM  
Phlebitis Assessment 0 10/24/2018  7:37 AM  
Infiltration Assessment 0 10/24/2018  7:37 AM  
Dressing Status Clean, dry, & intact 10/24/2018  7:37 AM  
Dressing Type Transparent;Tape 10/24/2018  7:37 AM  
Hub Color/Line Status Pink;Patent 10/24/2018  7:37 AM  
   
Peripheral IV 10/19/18 Anterior;Left;Superior; Upper Arm (Active) Site Assessment Clean, dry, & intact 10/24/2018  7:37 AM  
Phlebitis Assessment 0 10/24/2018  7:37 AM  
Infiltration Assessment 0 10/24/2018  7:37 AM  
Dressing Status Clean, dry, & intact 10/24/2018  7:37 AM  
Dressing Type Transparent;Tape 10/24/2018  7:37 AM  
Hub Color/Line Status Pink;Patent 10/24/2018  7:37 AM  
  
 
Opportunity for questions and clarification was provided. Patient transported with: 
 [employer]

## 2018-10-24 NOTE — PROGRESS NOTES
Hospitalist Progress Note NAME: Laly Davis :  1935 MRN:  [id number] Assessment / Plan: 
Acute encephalopathy (resolved) due to UTI, present on admission: not felt to have PNA 
- CT head with no acute findings seen - CXR 10/18 with no focal consolidation. Diffuse increased interstitial markings possibly chronic.  
- CT A/P 10/18 with COPD, right lung base infiltrate. Abdominal aortic aneurysm. Fecal stasis. Bladder wall thickening with prostate hypertrophy. - blood cultures no growth. Urine culture >[id number] klebsiella + morganella 
- con't rocephin (initially on unasyn) - palliative care consult appreciated, working with [facility] on POST form. Will be able to discharge back to [place] when advanced directive complete. Dysphagia: 
- MBS 10/23 with some delay in the oral phase and significant residue in the valleculae and piriform sinuses. Flash penetration was seen for water consistency but there was no aspiration. - con't puree diet with aspiration precaution per speech Hypothyroidism: con't synthroid Hyperlipidemia: con't lipitor Dementia: restart outpatient zoloft COPD without acute exacerbation: con't duoneb prn Hypomagnesemia, hypokalemia and hypophosphatemia: resolved 
  
Code status: Full - [facility] working with Palliative as above (Claritza Rileye [phone] is [facility]) Prophylaxis: Lovenox Subjective: Chief Complaint / Reason for Physician Visit \"I want to go home, not back to [place]\". More engaging today; no acute complaints. Discussed with RN events overnight. Review of Systems: 
Symptom Y/N Comments  Symptom Y/N Comments Fever/Chills n   Chest Pain n   
Poor Appetite n   Edema n   
Cough n   Abdominal Pain n   
Sputum n   Joint Pain SOB/WEBSTER n   Pruritis/Rash Nausea/vomit    Tolerating PT/OT Diarrhea    Tolerating Diet Constipation    Other Could NOT obtain due to:   
 
Objective: VITALS:  
 Last 24hrs VS reviewed since prior progress note. Most recent are: 
Patient Vitals for the past 24 hrs: 
 Temp Pulse Resp BP SpO2  
10/24/18 0717 97.8 °F (36.6 °C) 77 15 145/86 94 % 10/24/18 0304 98.6 °F (37 °C) 82 20 132/88 96 % 10/23/18 2157 98.3 °F (36.8 °C) 84 18 155/79 98 % 10/23/18 1920 98.2 °F (36.8 °C) 83 16 143/78 95 % 10/23/18 1732 97.5 °F (36.4 °C) 75 18 (!) 130/99 97 % 10/23/18 1101 97.7 °F (36.5 °C) 89 18 120/70 98 % Intake/Output Summary (Last 24 hours) at 10/24/2018 1001 Last data filed at 10/24/2018 3024 Gross per 24 hour Intake 1200 ml Output 2325 ml Net -1125 ml PHYSICAL EXAM: 
General: WD, WN. Alert, cooperative, no acute distress   
EENT:  EOMI. Anicteric sclerae. MMM Resp:  CTA bilaterally, no wheezing or rales. No accessory muscle use CV:  Regular rhythm,  No edema GI:  Soft, Non distended, Non tender.  +Bowel sounds Neurologic:  Alert and oriented X 3, normal speech, Psych:   Some insight. Not anxious nor agitated Skin:  No rashes. No jaundice Reviewed most current lab test results and cultures  YES Reviewed most current radiology test results   YES Review and summation of old records today    NO Reviewed patient's current orders and MAR    YES 
PMH/ reviewed - no change compared to H&P 
________________________________________________________________________ Care Plan discussed with: 
  Comments Patient x Family RN x Care Manager x Consultant Multidiciplinary team rounds were held today with , nursing, pharmacist and clinical coordinator. Patient's plan of care was discussed; medications were reviewed and discharge planning was addressed. ________________________________________________________________________ Total NON critical care TIME:  25 Minutes Total CRITICAL CARE TIME Spent:   Minutes non procedure based Comments >50% of visit spent in counseling and coordination of care x   
________________________________________________________________________ Vern Lizarraga MD  
 
Procedures: see electronic medical records for all procedures/Xrays and details which were not copied into this note but were reviewed prior to creation of Plan. LABS: 
I reviewed today's most current labs and imaging studies. Pertinent labs include: 
Recent Labs 10/24/18 
1108 WBC 7.2 HGB 11.6* HCT 33.5*  
 Recent Labs 10/24/18 
3613   
K 3.6  CO2 29 * BUN 9  
CREA 0.96  
CA 8.6 Signed: Vern Lizarraga MD

## 2018-10-25 NOTE — DISCHARGE INSTRUCTIONS
HOSPITALIST DISCHARGE INSTRUCTIONS    NAME: John Kent   :  1935   MRN:  [id number]     Date/Time:  10/25/2018 9:55 AM    ADMIT DATE: 10/18/2018   DISCHARGE DATE: 10/25/2018     Attending Physician: Corry Murray MD    DISCHARGE DIAGNOSIS:  Acute encephalopathy (resolved) due to UTI in setting of bladder wall thickening and BPH, present on admission  Dysphagia (chronic)  Hypothyroidism  Hyperlipidemia  Dementia  COPD without acute exacerbation  Hypomagnesemia, hypokalemia and hypophosphatemia: resolved      Medications: Per above medication reconciliation. Pain Management: per above medications    Recommended diet: Puree diet (NDD1) with ensure supplements BID    Recommended activity: Activity as tolerated    Wound care: None    Indwelling devices: Mendoza catheter placed 10/18/18 for urinary retention. Needs f/u with [facility] and catheter change on 18. Supplemental Oxygen: None    Required Lab work: None    Glucose management:  None    Code status: DNR - note that Pt has both an AMD and POST, POST is most recent document and indicates:  1) DNR  2) LIMITED MEDICAL INTERVENTIONS  3) NO FEEDING TUBE      Outside physician follow up: Follow-up Information     Follow up With Specialties Details Why Contact Info    Other, MD [facility]    Patient can only remember the practice name and not the physician                 Skilled nursing facility/ SNF MD responsible for above on discharge. Information obtained by :  I understand that if any problems occur once I am at home I am to contact my physician. I understand and acknowledge receipt of the instructions indicated above.                                                                                                                                            Physician's or R.N.'s Signature                                                                  Date/Time Patient or Representative

## 2018-10-25 NOTE — PROGRESS NOTES
Bedside and Verbal shift change report given to Aleja (oncoming nurse) by Maurizio Xiong (offgoing nurse). Report included the following information SBAR, Kardex, Intake/Output and Recent Results.

## 2018-10-25 NOTE — ACP (ADVANCE CARE PLANNING)
Primary Decision Maker Info: 
 
 Pennie Gaxiola [phone] Secondary Decision Maker Info: 
  
 Evelin Simon [phone] Advance Directive Info: 
Confirm Advance Directive: None Pt has both an AMD and POST, POST is most recent document and indicates: 
 
1) DNR 
2) [address] 3) NO FEEDING TUBE

## 2018-10-25 NOTE — PROGRESS NOTES
[facility] Report consisted of SBAR, MAR and recent results. An opportunity for questions and clarification was provided for. A copy of discharge papers and MAR was printed to be sent with patient.

## 2018-10-25 NOTE — PROGRESS NOTES
Problem: Pressure Injury - Risk of 
Goal: *Prevention of pressure injury Document Bill Scale and appropriate interventions in the flowsheet. Outcome: Progressing Towards Goal 
Pressure Injury Interventions: 
Sensory Interventions: Assess changes in LOC, Avoid rigorous massage over bony prominences, Check visual cues for pain, Float heels Moisture Interventions: Absorbent underpads Activity Interventions: Assess need for specialty bed, Increase time out of bed Mobility Interventions: Assess need for specialty bed, Float heels, Pressure redistribution bed/mattress (bed type), PT/OT evaluation Nutrition Interventions: Document food/fluid/supplement intake Friction and Shear Interventions: Apply protective barrier, creams and emollients, Feet elevated on foot rest 
 
  
 
 
 
 
Problem: Falls - Risk of 
Goal: *Absence of Falls Document Marinus Cornea Fall Risk and appropriate interventions in the flowsheet. Outcome: Progressing Towards Goal 
Fall Risk Interventions: 
Mobility Interventions: Bed/chair exit alarm, Communicate number of staff needed for ambulation/transfer Mentation Interventions: Adequate sleep, hydration, pain control, Bed/chair exit alarm, Door open when patient unattended, Evaluate medications/consider consulting pharmacy, Family/sitter at bedside, More frequent rounding Medication Interventions: Bed/chair exit alarm, Evaluate medications/consider consulting pharmacy Elimination Interventions: Call light in reach

## 2018-10-25 NOTE — PROGRESS NOTES
SW spoke with Palliative who stated they plan to meet with the daughter today and pt should be ready for discharge later this afternoon. Referral sent to [facility] for their review as pt is on [address] there. SW to contact attending and alert her of the plans. SW will continue to follow and assist with additional discharge needs. 10:45A 
SW received call from Palliative who stated they have met with the daughter and pt is ready for discharge. SW contacted attending MD to inform of Palliative meeting and if pt is medically stable he can be discharged. Attending stated she would discharge pt. SW contacted [facility] to ensure they could accept the pt. Per Benjamen Sayres, they are prepared to accept pt back today around 12.  SW attempted to contact the pt daughter to inform her of the discharge time, no answer and the VM is full and not allowing calls. Floor RN provided the call report number. [facility] scheduled for 12P. PCS on pt bedside chart. Care Management Interventions PCP Verified by CM: No 
Mode of Transport at Discharge: BLS Transition of Care Consult (CM Consult): Long Term Care([facility], [facility]. and Rehab) Current Support Network: [address] [place], [place] [address] Plan discussed with Pt/Family/Caregiver: Yes Discharge Location Discharge Placement: [address] JAMES Justin Ext [phone]

## 2018-10-25 NOTE — DISCHARGE SUMMARY
Hospitalist Discharge Summary Patient ID: John Kent 
[id number] 
3 y.o. 
1935 PCP on record: Armida, MD Abbey 
 
Admit date: 10/18/2018 Discharge date and time: 10/25/2018 DISCHARGE DIAGNOSIS: 
Acute encephalopathy (resolved) due to UTI, present on admission Dysphagia (chronic) Hypothyroidism Hyperlipidemia Dementia COPD without acute exacerbation Hypomagnesemia, hypokalemia and hypophosphatemia: resolved CONSULTATIONS: 
IP CONSULT TO PALLIATIVE CARE - PROVIDER Excerpted HPI from H&P of Mary Rivera MD: 
82years old male from NH  with past medical history significant for dementia COPD hyper tension presented to the ED for evaluation of right upper abdominal pain associated with vomiting associated with change mental status patient very his poor historian and most history was obtained from the transfer paper, workup in the ER was significant for positive UA, abdominal CT scan was done on show bladder wall thickening on the right lung base infiltrate. 
 
______________________________________________________________________ DISCHARGE SUMMARY/HOSPITAL COURSE:  for full details see H&P, daily progress notes, labs, consult notes. Hospital course: 
Acute encephalopathy (resolved) due to UTI, present on admission: not felt to have PNA. Pt had CT head with no acute findings seen. He had CXR 10/18 with no focal consolidation. Diffuse increased interstitial markings possibly chronic. Pt then had CT A/P 10/18 with COPD, right lung base infiltrate. Abdominal aortic aneurysm. Fecal stasis. Bladder wall thickening with prostate hypertrophy. Pt had blood cultures no growth.  Urine culture >[id number] klebsiella + morganella. He was treated initially with unasyn, then changed to rocephin based on sensitivities. He will complete oral omnicef as outpatient for this infection.   Mendoza catheter placed 10/18/18 for urinary retention and CT findings of bladder wall thickening with BPH. Pt needs f/u with [facility] and catheter change on 11/1/18. Pt also seen by palliative care while here who worked with his mPOAs regarding his overall plan of care. Primary Decision Maker Info: Ernesto Garza [phone]  
Secondary Decision Maker Info: 
Doris Coleman [phone] Pt has both an AMD and POST, POST completed 10/25/18 is most recent document and indicates: 
1) DNR 
2) [address] 3) NO FEEDING TUBE Dysphagia: MBS 10/23 with some delay in the oral phase and significant residue in the valleculae and piriform sinuses. Flash penetration was seen for water consistency but there was no aspiration. Pt can con't puree diet with aspiration precaution per speech. Hypothyroidism: con't synthroid Hyperlipidemia: con't lipitor Dementia: restart outpatient zoloft COPD without acute exacerbation: con't duoneb prn Hypomagnesemia, hypokalemia and hypophosphatemia: resolved 
 
_______________________________________________________________________ Patient seen and examined by me on discharge day. Pertinent Findings: 
Gen: awake, appropriate at baseline (confusion to situation and medical issues), NAD HEENT: cl brendan, no lesions Chest: CTA bilaterally, no crackles or wheezes Cv: RRR, no murmur, no edema Abd: soft, NT, ND, BS+, no mass Neuro: CN intact 
_______________________________________________________________________ DISCHARGE MEDICATIONS:  
Current Discharge Medication List  
  
START taking these medications Details  
cefdinir (OMNICEF) 300 mg capsule Take 1 Cap by mouth two (2) times a day for 3 days. Qty: 6 Cap, Refills: 0 CONTINUE these medications which have NOT CHANGED Details  
levothyroxine (SYNTHROID) 112 mcg tablet Take 112 mcg by mouth Daily (before breakfast). aspirin delayed-release 81 mg tablet Take 81 mg by mouth daily. sertraline (ZOLOFT) 50 mg tablet Take 50 mg by mouth daily. cyanocobalamin (VITAMIN B-12) 1,000 mcg tablet Take 1,000 mcg by mouth daily. ascorbic acid, vitamin C, (VITAMIN C) 1,000 mg tablet Take 1,000 mg by mouth daily. atorvastatin (LIPITOR) 20 mg tablet Take 20 mg by mouth daily. My Recommended Diet, Activity, Wound Care, and follow-up labs are listed in the patient's Discharge Insturctions which I have personally completed and reviewed. ______________________________________________________________________ Risk of deterioration: Moderate Condition at Discharge:  Stable 
______________________________________________________________________ Disposition SNF/LTC 
______________________________________________________________________ Care Plan discussed with:  
Patient, RN, Care Manager 
 
______________________________________________________________________ Code Status: DNR/DNI 
______________________________________________________________________ Follow up with: PCP : Other, MD Abbey 
Follow-up Information Follow up With Specialties Details Why Contact Info Other, MD Abbey    Patient can only remember the practice name and not the physician Total time in minutes spent coordinating this discharge (includes going over instructions, follow-up, prescriptions, and preparing report for sign off to her PCP) :  35 minutes Signed: Lissett Moulton MD

## 2018-10-25 NOTE — PROGRESS NOTES
Problem: Pressure Injury - Risk of 
Goal: *Prevention of pressure injury Document Bill Scale and appropriate interventions in the flowsheet. Outcome: Progressing Towards Goal 
Pressure Injury Interventions: 
Sensory Interventions: Assess changes in LOC, Avoid rigorous massage over bony prominences, Check visual cues for pain, Float heels Moisture Interventions: Absorbent underpads Activity Interventions: Assess need for specialty bed, Increase time out of bed Mobility Interventions: Assess need for specialty bed, Float heels, Pressure redistribution bed/mattress (bed type), PT/OT evaluation Nutrition Interventions: Document food/fluid/supplement intake Friction and Shear Interventions: Apply protective barrier, creams and emollients, Feet elevated on foot rest

## 2018-10-26 NOTE — PROGRESS NOTES
Sandra Hickey from [facility] requested Discharge Summary. Faxed to:  [phone] Task completed. Rachel Kumar RN CM Ext [phone]

## 2018-11-24 PROBLEM — R41.82 ALTERED MENTAL STATUS: Status: ACTIVE | Noted: 2018-01-01

## 2018-11-24 NOTE — H&P
Hospitalist Admission NoteNAME: John Kent :  1935 MRN:  [id number] Date/Time:  2018 7:33 AM 
 
Patient PCP: Jennifer Poe MD 
________________________________________________________________________ Given the patient's current clinical presentation, I have a high level of concern for decompensation if discharged from the emergency department. Complex decision making was performed, which includes reviewing the patient's available past medical records, laboratory results, and x-ray films. My assessment of this patient's clinical condition and my plan of care is as follows. Assessment / Plan: 
Septic shock 2/2 UTI AMS most likely toxic metabolic encephalopathy in setting sepsis POA Severe sepsis 2/2  Complicated UTI in setting of indwelling mike POA Acute respiratory failure with hypoxia poa  
hypothermia ELISABETH most likely prerenal  
Hypernatremia Hypokalemia 
- started levophed through central ine  
-CT scan of the brain was negative, keep NPO while lethargic, speech eval   
-We will continue with iv ceftriaxone , last urine culture in 2018 grew Klebsiella oxytoca and morganella Morgani sensitive to ceftriaxone. Continue with as needed IV fluid boluses if systolic blood pressure less than 90 Continue with D5 half-normal saline with K,  keep n.p.o. while lethargic 
-Continue with Manjinder hugger until temp reached 36.5 Daily BMP Follow-up urine culture, blood cultures Mike changed in the ED 
- We will get urine electrolytes,  
renal ultrasound showed : 
1. No hydronephrosis or other sonographic abnormality of the kidneys. 2. Infrarenal abdominal aortic aneurysm measuring 4.6 x 4.3 cm - K repleted , repeat bmp at 2pm , k+ is 2.6 Hypertension 
hld Hypothyroidism Hold all BP meds Hold atorvastatin Restart levothyroxine once able to tolerate p.o. 
 
H/o cva Dementia Depression Hold sertraline Copd Stable , c/w prn duonebs No home inhalers DVT prophylaxis Heparin CODE STATUS  
DNR , called Nok on chart Terri Jade at [phone] ,no answer awaiting callback , left a hipaa compliant message . NOK called back , Pt is DNR Pt is critically ill , will get palliative involved Disposition TBD Code Status: DNR ( ACP on chart) DVT Prophylaxis: Hep SQ 
GI Prophylaxis: not indicated Subjective: CHIEF COMPLAINT: AMS HISTORY OF PRESENT ILLNESS:    
Gisel Arias is a 82 y.o.  male with known history as listed below presents to ED with complaint noted above. Available records were reviewed at the time of H&P. This is a 53-year-old male from nursing home with past medical history of chronic Mendoza, COPD, hypothyroidism, dementia who was sent to the ED with altered mental status.  Most of the history taken from the ED note as no family members was was at bedside. Per EMS, [employer] states pt is \"not talking per his baseline. \" RN at nursing home also reports \"periods of apnea. Upon presentation to the ED, patient was found to be hypotensive and dehydrated, and hypothermic.  Review of his labs showed that he has hyponatremia, ELISABETH, and uti.   Lactic acid was within normal limits.  Patient is being treated with IVf, iv  ceftriaxone, has a bear hugger. When seen patient was unable to contribute any history.  Therefore patient admitted for severe sepsis secondary to complicated UTI. We were asked to see for work up and evaluation of the above problems. Past Medical History:  
Diagnosis Date  Cancer ([facility] [address]  Chronic obstructive pulmonary disease ([facility] [address]  Dementia  Hypertension History reviewed. No pertinent surgical history. Social History Tobacco Use  Smoking status: Former Smoker  Smokeless tobacco: Never Used Substance Use Topics  Alcohol use: No  
  Frequency: Never History reviewed. No pertinent family history. No Known Allergies Prior to Admission medications Medication Sig Start Date End Date Taking? Authorizing Provider  
levothyroxine (SYNTHROID) 112 mcg tablet Take 112 mcg by mouth Daily (before breakfast). Abbey Huffman MD  
aspirin delayed-release 81 mg tablet Take 81 mg by mouth daily. Abbey Huffman MD  
sertraline (ZOLOFT) 50 mg tablet Take 50 mg by mouth daily. Abbey Huffman MD  
cyanocobalamin (VITAMIN B-12) 1,000 mcg tablet Take 1,000 mcg by mouth daily. Abbey Huffman MD  
ascorbic acid, vitamin C, (VITAMIN C) 1,000 mg tablet Take 1,000 mg by mouth daily. Abbey Huffman MD  
atorvastatin (LIPITOR) 20 mg tablet Take 20 mg by mouth daily. Abbey Huffman MD  
 
REVIEW OF SYSTEMS:  See HPI for details Patient was not able to provide review of systems due to mental status change/acute illness Objective: VITALS:   
Visit Vitals BP (!) 130/105 (BP 1 Location: Right arm, BP Patient Position: At rest) Pulse 64 Temp 97.5 °F (36.4 °C) Resp 18 SpO2 95% PHYSICAL EXAM:  
General:    Toxic ill appearing , lethargic , cachectic HEENT: Atraumatic, anicteric sclerae, pink conjunctivae No oral ulcers, mucosa dry , throat clear Neck:  Supple, symmetrical,  thyroid: non tender Lungs:   Decreased b/l breath sounds Chest wall:  No tenderness  No Accessory muscle use. Heart:    rapid Regular  rhythm,  No  murmur   No edema Abdomen:   Soft, non-tender. Not distended. Bowel sounds normal 
 Extremities: No cyanosis. No clubbing Skin:     Not pale. Not Jaundiced  No rashes Psych:   
Neurologic: Lethargic _______________________________________________________________________ Care Plan discussed with: 
  Comments Patient Family  x Left a message RN x Care Manager Consultant:  raymond   
_______________________________________________________________________ Recommended Disposition:  
Home with Family HH/PT/OT/RN   
SNF/LTC   
[facility]   
 ________________________________________________________________________ TOTAL TIME:   Minutes Critical Care Provided   40  Minutes non procedure based Comments >50% of visit spent in counseling and coordination of care  Chart review Discussion with patient and/or family and questions answered  
 
________________________________________________________________________ Signed: Maggy Lopez MD 
 
This note will not be viewable in [address] Procedures: see electronic medical records for all procedures/Xrays and details which were not copied into this note but were reviewed prior to creation of Plan. LAB DATA REVIEWED:   
Recent Results (from the past 24 hour(s)) URINALYSIS W/ REFLEX CULTURE Collection Time: 11/24/18 12:34 AM  
Result Value Ref Range Color DARK YELLOW Appearance CLOUDY (A) CLEAR Specific gravity 1.020 1.003 - 1.030    
 pH (UA) 6.0 5.0 - 8.0 Protein 100 (A) NEG mg/dL Glucose NEGATIVE  NEG mg/dL Ketone NEGATIVE  NEG mg/dL Blood MODERATE (A) NEG Urobilinogen 1.0 0.2 - 1.0 EU/dL Nitrites NEGATIVE  NEG Leukocyte Esterase LARGE (A) NEG    
 WBC  0 - 4 /hpf  
 RBC 0-5 0 - 5 /hpf Epithelial cells FEW FEW /lpf Bacteria 4+ (A) NEG /hpf  
 UA:UC IF INDICATED URINE CULTURE ORDERED (A) CNI Hyaline cast 0-2 0 - 5 /lpf Budding yeast PRESENT (A) NEG    
CBC WITH AUTOMATED DIFF Collection Time: 11/24/18 12:34 AM  
Result Value Ref Range WBC 9.0 4.1 - 11.1 K/uL  
 RBC 3.60 (L) 4.10 - 5.70 M/uL  
 HGB 11.4 (L) 12.1 - 17.0 g/dL HCT 32.4 (L) 36.6 - 50.3 % MCV 90.0 80.0 - 99.0 FL  
 MCH 31.7 26.0 - 34.0 PG  
 MCHC 35.2 30.0 - 36.5 g/dL  
 RDW 16.6 (H) 11.5 - 14.5 % PLATELET 976 (L) 737 - 400 K/uL MPV 10.9 8.9 - 12.9 FL  
 NRBC 0.0 0  WBC ABSOLUTE NRBC 0.00 0.00 - 0.01 K/uL NEUTROPHILS 90 (H) 32 - 75 % BAND NEUTROPHILS 8 % LYMPHOCYTES 1 (L) 12 - 49 % MONOCYTES 1 (L) 5 - 13 % EOSINOPHILS 0 0 - 7 % BASOPHILS 0 0 - 1 % IMMATURE GRANULOCYTES 0 0.0 - 0.5 % ABS. NEUTROPHILS 8.8 (H) 1.8 - 8.0 K/UL  
 ABS. LYMPHOCYTES 0.1 (L) 0.8 - 3.5 K/UL  
 ABS. MONOCYTES 0.1 0.0 - 1.0 K/UL  
 ABS. EOSINOPHILS 0.0 0.0 - 0.4 K/UL  
 ABS. BASOPHILS 0.0 0.0 - 0.1 K/UL  
 ABS. IMM. GRANS. 0.0 0.00 - 0.04 K/UL  
 DF AUTOMATED    
 RBC COMMENTS ANISOCYTOSIS 
1+ METABOLIC PANEL, COMPREHENSIVE Collection Time: 11/24/18 12:34 AM  
Result Value Ref Range Sodium 149 (H) 136 - 145 mmol/L Potassium 2.6 (LL) 3.5 - 5.1 mmol/L Chloride 115 (H) 97 - 108 mmol/L  
 CO2 29 21 - 32 mmol/L Anion gap 5 5 - 15 mmol/L Glucose 93 65 - 100 mg/dL BUN 34 (H) 6 - 20 MG/DL Creatinine 1.42 (H) 0.70 - 1.30 MG/DL  
 BUN/Creatinine ratio 24 (H) 12 - 20 GFR est AA 43 (L) >60 ml/min/1.73m2 GFR est non-AA 36 (L) >60 ml/min/1.73m2 Calcium 8.7 8.5 - 10.1 MG/DL Bilirubin, total 0.4 0.2 - 1.0 MG/DL  
 ALT (SGPT) 26 12 - 78 U/L  
 AST (SGOT) 24 15 - 37 U/L Alk. phosphatase 93 45 - 117 U/L Protein, total 6.6 6.4 - 8.2 g/dL Albumin 1.7 (L) 3.5 - 5.0 g/dL Globulin 4.9 (H) 2.0 - 4.0 g/dL A-G Ratio 0.3 (L) 1.1 - 2.2 MAGNESIUM Collection Time: 11/24/18 12:34 AM  
Result Value Ref Range Magnesium 1.9 1.6 - 2.4 mg/dL BILIRUBIN, CONFIRM Collection Time: 11/24/18 12:34 AM  
Result Value Ref Range Bilirubin UA, confirm NEGATIVE  NEG    
GLUCOSE, POC Collection Time: 11/24/18 12:56 AM  
Result Value Ref Range Glucose (POC) 96 65 - 100 mg/dL Performed by Margy Sacks GLUCOSE, POC Collection Time: 11/24/18  2:33 AM  
Result Value Ref Range Glucose (POC) 93 65 - 100 mg/dL Performed by Margy Sacks CULTURE, BLOOD, PAIRED Collection Time: 11/24/18  2:45 AM  
Result Value Ref Range Special Requests: NO SPECIAL REQUESTS Culture result: NO GROWTH AFTER 2 HOURS    
LACTIC ACID Collection Time: 11/24/18  2:58 AM  
Result Value Ref Range Lactic acid 1.3 0.4 - 2.0 MMOL/L

## 2018-11-24 NOTE — PROGRESS NOTES
Procedure Note - Central Line Placement:  
12:58 PM 
Performed by: Dana Warren DO Immediately prior to the procedure, the patient was reevaluated and found suitable for the planned procedure and any planned medications. Immediately prior to the procedure a time out was called to verify the correct patient, procedure, equipment, staff, and marking as appropriate. Area was cleansed with Chlorprep and anesthetized with 5 mLs of 1% lidocaine. Prepped and draped in sterile fashion. Landmarks identified. 18 gauge needle with triple lumen catheter was inserted into pt's Left, Internal Jugular Vein with ultrasound guidance. Line sutured in place; sterile dressing applied. Position: Trendelenburg Number of attempts: 1 Estimated blood loss: < 5 cc's The procedure took 1-15 minutes, and pt tolerated well. At the request of anesthesia and hospitalist due to septic shock. This note is prepared by Wyatt Holloway, acting as Scribe for Dana Warren DO. Dana Warren DO: The scribe's documentation has been prepared under my direction and personally reviewed by me in its entirety. I confirm that the note above accurately reflects all work, treatment, procedures, and medical decision making performed by me.

## 2018-11-24 NOTE — ED NOTES
This RN spoke with Dr. Merissa Charles regarding duplicate order for pt's continuous fluids. Orders to be altered by Dr. Merissa Charles.

## 2018-11-24 NOTE — PROGRESS NOTES
Spiritual Care Assessment/Progress Note [address] 
 
 
NAME: Joseph Dewey      MRN: [id number] AGE: 82 y.o. SEX: male Yazidism Affiliation: Unknown Language: English  
 
11/24/2018     Total Time (in minutes): 12 Spiritual Assessment begun in [facility] EMERGENCY DEPT through conversation with: 
  
    []Patient        [] Family    [] Friend(s) Reason for Consult: Palliative Care, Initial/Spiritual Assessment, Emergency Department visit Spiritual beliefs: (Please include comment if needed) 
   [] Identifies with a claudia tradition:     
   [] Supported by a claudia community:        
   [] Claims no spiritual orientation:       
   [] Seeking spiritual identity:            
   [] Adheres to an individual form of spirituality:       
   [x] Not able to assess:                   
 
    
Identified resources for coping:  
   [] Prayer                           
   [] Music                  [] Guided Imagery 
   [] Family/friends                 [] Pet visits [] Devotional reading                         [x] Unknown 
   [] Other:                                        
 
 
Interventions offered during this visit: (See comments for more details) Patient Interventions: Initial visit Plan of Care: 
 
 [x] Support spiritual and/or cultural needs  
 [] Support AMD and/or advance care planning process    
 [] Support grieving process 
 [] Coordinate Rites and/or Rituals  
 [] Coordination with community clergy [] No spiritual needs identified at this time 
 [] Detailed Plan of Care below (See Comments)  [] Make referral to Music Therapy 
[] Make referral to Pet Therapy    
[] Make referral to Addiction services 
[] Make referral to [facility] 
[] Make referral to Spiritual Care Partner 
[] No future visits requested       
[] Follow up visits as needed Comments:  Attempted visit in ER for new palliative consult patient.   Room was darkened and no visitors were present. Mr. Sabiha Hurtado was sleeping soundly.  available as needed and will follow up as able. Dionisio Daniel, MPS, [address], [facility] Paging Service  [phone])

## 2018-11-24 NOTE — ED NOTES
Bedside and Verbal shift change report given to Eron Sierra RN (oncoming nurse) by Johnnie Eduardo RN (offgoing nurse). Report included the following information SBAR, ED Summary, MAR and Recent Results.

## 2018-11-24 NOTE — PROGRESS NOTES
Pharmacy Automatic Renal Dosing Protocol - Antimicrobials Indication for Antimicrobials: UTI Current Regimen of Each Antimicrobial: 
Vancomycin 1250 mg IV load followed by 750 mg IV every 16 hours (Start Date ; Day # ) Cefepime 2 g IV every 8 hours (Start Date ; Day # ) Previous Antimicrobial Therapy: 
Ceftriaxone Goal Level: VANCOMYCIN TROUGH GOAL RANGE Vancomycin Trough: 15 - 20 mcg/mL Date Dose & Interval Measured (mcg/mL) Extrapolated (mcg/mL) Significant Cultures:  
 Urine: pending  Blood: NGTD x 2 hours- pending Paralysis, amputations, malnutrition: None documented Labs: 
Recent Labs  
  18 
1215 18 
0034 CREA 1.18 1.42* BUN 31* 34* WBC  --  9.0 Temp (24hrs), Av.2 °F (34.6 °C), Min:89.3 °F (31.8 °C), Max:97.3 °F (36.3 °C) Creatinine Clearance (mL/min) or Dialysis: 45 mL/min Impression/Plan: · Will order vancomycin 1250 mg IV load followed by 750 mg IV every 16 hours for an estimated trough of 15 mcg/mL · Will change cefepime to 2 g IV every 12 hours for CrCl 30-60 mL/min per renal dosing protocol. · Antimicrobial stop date 7 days Pharmacy will follow daily and adjust medications as appropriate for renal function and/or serum levels. Thank you, Lien Duval, PHARMD 
 
Recommended duration of therapy 
http://[facility]/[place]/[facility]/Pharmacy/Clinical%20Companion/Duration%20of%20ABX%20therapy. docx Renal Dosing 
http://[facility]/[place]/[facility]/Pharmacy/Clinical%20Companion/Renal%20Dosing%[email or web address]. pdf

## 2018-11-24 NOTE — ED NOTES
Chest xray reviewed by Dr. Angella Tilley and verbal orders given that levophed may be initiated and central line may be used.

## 2018-11-24 NOTE — ED NOTES
Central line placed by Dr. Dina Guzman. Patient tolerated well. Radiology called and at bedside to obtain ordered portable chest xray for central line placement verification.

## 2018-11-24 NOTE — ED NOTES
Patient's BP decreased to the mid 19B systolic. Dr. Carlos Soto called and notified. Dr. Carlos Soto to call anesthesia for central line placement. Charge RN made aware.

## 2018-11-24 NOTE — ED NOTES
Patient noted with decreased oxygen saturation of 84% on RA. Pt placed on NC @3L. O2 saturation increased to 94%. MD paged.

## 2018-11-24 NOTE — ED NOTES
Verbal orders given by Dr. Sandra Vargas to maintain MAP of greater than 65. Verbal orders also given to discontinue hallie hugger once rectal temp above 97.0.

## 2018-11-24 NOTE — PROGRESS NOTES
Pt BP is now in the 80's despite 2liter IVF Pt in septc shock Called again [facility] on chart calledback Daughter updated about critical condition of her stepdad , and that he might not make it She verbalized understanding and is appreciative of the update She will update her mom who is also currently not doing well ACP on chart , pt wants to be dnr with limited measures But ok with central line and pressors Anesthesia paged for central line Will start ian peripherilly while getting central line Consent verbally  Obtained with daughter Chart updated to DNR

## 2018-11-24 NOTE — ROUTINE PROCESS
TRANSFER - OUT REPORT: 
 
Verbal report given to Farhana Cassidy RN(name) on Edis Pham  being transferred to CCU(unit) for routine progression of care Report consisted of patients Situation, Background, Assessment and  
Recommendations(SBAR). Information from the following report(s) SBAR, ED Summary, [facility] and Recent Results was reviewed with the receiving nurse. Lines:  
Triple Lumen 11/24/18 Left Internal jugular (Active) Site Assessment Clean, dry, & intact 11/24/2018  1:20 PM  
Infiltration Assessment 0 11/24/2018  1:20 PM  
Dressing Status Clean, dry, & intact 11/24/2018  1:20 PM  
Positive Blood Return (Medial Site) Yes 11/24/2018  1:20 PM  
Positive Blood Return (Lateral Site) Yes 11/24/2018  1:20 PM  
Positive Blood Return (Site #3) Yes 11/24/2018  1:20 PM  
   
Peripheral IV 11/24/18 Right Hand (Active) Peripheral IV 11/24/18 Left Arm (Active) Site Assessment Clean, dry, & intact 11/24/2018  7:05 AM  
Phlebitis Assessment 0 11/24/2018  7:05 AM  
Infiltration Assessment 0 11/24/2018  7:05 AM  
Dressing Status Clean, dry, & intact 11/24/2018  7:05 AM  
Dressing Type Transparent 11/24/2018  7:05 AM  
Hub Color/Line Status Pink 11/24/2018  7:05 AM  
Action Taken Dressing reinforced 11/24/2018  7:05 AM  
   
Peripheral IV 11/24/18 Right Antecubital (Active) Site Assessment Clean, dry, & intact 11/24/2018 12:35 PM  
Phlebitis Assessment 0 11/24/2018 12:35 PM  
Infiltration Assessment 0 11/24/2018 12:35 PM  
Dressing Status Clean, dry, & intact 11/24/2018 12:35 PM  
Dressing Type Transparent 11/24/2018 12:35 PM  
Hub Color/Line Status Green 11/24/2018 12:35 PM  
  
 
Opportunity for questions and clarification was provided. Patient transported with: 
 Monitor O2 @ 3 liters Registered Nurse Tech

## 2018-11-24 NOTE — PROGRESS NOTES
TRANSFER - IN REPORT: 
 
Verbal report received from [address] [place] [address] (name) on Liam Healy  being received from ED (unit) for routine progression of care Report consisted of patients Situation, Background, Assessment and  
Recommendations(SBAR). Information from the following report(s) SBAR, Kardex and MAR was reviewed with the receiving nurse. Opportunity for questions and clarification was provided. Assessment completed upon patients arrival to unit and care assumed. 1739- Patient received to CCU. CHG bath completed. Gown changed. Patient assessed. See flowsheet. Primary Nurse Marshall Duverney, REBECCA and Eloisa Rubinstein, RN performed a dual skin assessment on this patient Impairment noted- see wound doc flow sheet. Petechiae and redness noted to bilateral feet. 1826- Levophed weaned off. 
1900-Bedside shift change report given to Kelsie (oncoming nurse) by Kain Hernandez (offgoing nurse). Report included the following information SBAR, Kardex and MAR.

## 2018-11-24 NOTE — ED NOTES
Consult to intensitist Dr. Brant Tony called. Dr. Brant Tony updated on pt and notified that patient is a CCU hold in the ER at this time. Verbal orders given that CVP monitoring is not needed at this time.

## 2018-11-24 NOTE — PROGRESS NOTES
[facility] [place], Critical Care, and Sleep Medicine Name: Laly Davis MRN: [id number] : 1935 Hospital: [facility] Date: 2018 IMPRESSION:  
· Septic shock · UTI with chronic mike · Hypothermia · Acute renal failure · Severe volume contraction · Hypernatremia · COPD without exacerbation · Dementia · Hypothyroidism PLAN:  
· O2 as needed · IV fluids - needs volume expansion first, then hypotonic fluids later · IV antibiotics - broaden coverage; recently on antibiotics in hospital for UTI · Pressors · Follow up cultures · Bronchodilators PRN 
· DVT prophylaxis · DNR 
· Critically ill Subjective/Interval History:  
I have reviewed the flowsheet and previous days notes. The patient is unable to give any meaningful history or review of systems because the patient is: 
Lethargic - admitted today with AMS beyond his baseline, has a chronic mike, found to have UTI and septic shock, he can not provide any history at this time The patient is critically ill on:     
Pressors Review of Systems Unable to perform ROS: Mental status change Objective: 
Vital Signs:   
Visit Vitals BP (!) 81/62 Pulse 80 Temp 96.8 °F (36 °C) Resp 14 Wt 65.3 kg (143 lb 15.4 oz) SpO2 94% BMI 19.52 kg/m² O2 Device: Nasal cannula O2 Flow Rate (L/min): 3 l/min Temp (24hrs), Av.2 °F (34.6 °C), Min:89.3 °F (31.8 °C), Max:97.3 °F (36.3 °C) Intake/Output:  
Last shift:      701 - 1900 In: 1000 [I.V.:1000] Out: - Last 3 shifts: 1901 - 700 In: 48 [I.V.:50] Out: - Intake/Output Summary (Last 24 hours) at 2018 1435 Last data filed at 2018 8959 Gross per 24 hour Intake 1050 ml Output  Net 1050 ml Physical Exam  
Constitutional: He appears lethargic. He appears ill. No distress. HENT:  
Head: Normocephalic and atraumatic. Mouth/Throat: Mucous membranes are dry. No oropharyngeal exudate. Eyes: No scleral icterus. Cardiovascular: Normal rate and regular rhythm. Pulmonary/Chest: No respiratory distress. He has no wheezes. He has no rales. Abdominal: Soft. Bowel sounds are normal. He exhibits no distension. There is no tenderness. Musculoskeletal: He exhibits no edema. Neurological: He appears lethargic. Skin: Skin is warm and dry. No rash noted. Data:  
 
Current Facility-Administered Medications Medication Dose Route Frequency  sodium chloride (NS) flush 5-10 mL  5-10 mL IntraVENous Q8H  
 heparin (porcine) injection 5,000 Units  5,000 Units SubCUTAneous Q8H  
 [START ON 11/25/2018] cefTRIAXone (ROCEPHIN) 1 g in 0.9% sodium chloride (MBP/ADV) 50 mL  1 g IntraVENous Q24H  
 dextrose 5% - 0.45% NaCl with KCl 20 mEq/L infusion  75 mL/hr IntraVENous CONTINUOUS  
 NOREPINephrine (LEVOPHED) 8 mg in dextrose 5% 250 mL infusion  2-16 mcg/min IntraVENous TITRATE Labs: 
Recent Labs  
  11/24/18 
0034 WBC 9.0 HGB 11.4* HCT 32.4*  
* Recent Labs  
  11/24/18 
1215 11/24/18 
0034 * 149*  
K 3.5 2.6*  
* 115* CO2 23 29 GLU 70 93 BUN 31* 34* CREA 1.18 1.42* CA 7.7* 8.7 MG  --  1.9 ALB  --  1.7* TBILI  --  0.4 SGOT  --  24 ALT  --  26 No results for input(s): PH, PCO2, PO2, HCO3, FIO2 in the last 72 hours. Imaging: 
I have personally reviewed the patients radiographs and have reviewed the reports: 
Chronic emphysematous changes Total critical care time exclusive of procedures: 35 minutes Tacey Klinefelter, MD

## 2018-11-24 NOTE — ED NOTES
Verbal orders given by Dr. Ash Prim to initiate ordered levophed once central line is confirmed via chest xray and to discontinue ordered ian-synephrine.

## 2018-11-24 NOTE — ED PROVIDER NOTES
EMERGENCY DEPARTMENT HISTORY AND PHYSICAL EXAM 
 
 
Date: 11/24/2018 Patient Name: Thania Hinkle History of Presenting Illness Chief Complaint Patient presents with  Altered mental status  
  >pt arrives from long term care facility. per staff, not verbal per his baseline. + hx of dementia, pt appears to have dry mucous membranes. History Provided By: EMS 
 
HPI: Thania Hinkle, 82 y.o. male with PMHx significant for dementia, HTN, COPD, CA, stroke, presents via EMS to the ED for evaluation of AMS x 1 day. Per EMS, [employer] states pt is \"not talking per his baseline. \" RN at nursing home also reports \"periods of apnea. \" Pt is on ASA but no other blood thinners. Chart review shows pt is full code. History of present illness is limited due to AMS. PCP: Abbey Huffman MD 
 
PMHx: Significant for dementia, HTN, COPD, CA, stroke PSHx: Significant for none Social Hx: tobacco (former), - EtOH, Illicit Drugs (-) Past History Past Medical History: 
Past Medical History:  
Diagnosis Date  Cancer ([facility]  Chronic obstructive pulmonary disease ([facility]  Dementia  Hypertension Past Surgical History: 
History reviewed. No pertinent surgical history. Family History: 
History reviewed. No pertinent family history. Social History: 
Social History Tobacco Use  Smoking status: Former Smoker  Smokeless tobacco: Never Used Substance Use Topics  Alcohol use: No  
  Frequency: Never  Drug use: No  
 
 
Allergies: 
No Known Allergies Review of Systems Review of Systems Unable to perform ROS: Dementia Physical Exam  
Physical Exam  
Constitutional: She appears toxic. She has a sickly appearance. She appears ill. She appears distressed. HENT:  
Head: Normocephalic and atraumatic. Mouth/Throat: Oropharynx is clear and moist. Mucous membranes are dry. No oropharyngeal exudate.   
Eyes: Conjunctivae and EOM are normal. Pupils are equal, round, and reactive to light. Neck: Normal range of motion. Cardiovascular: Normal rate, regular rhythm and normal heart sounds. Exam reveals no gallop and no friction rub. No murmur heard. Pulmonary/Chest: Effort normal and breath sounds normal. No respiratory distress. She has no wheezes. She has no rales. She exhibits no tenderness. Abdominal: Soft. Bowel sounds are normal. She exhibits no distension and no mass. There is no tenderness. There is no rebound and no guarding. Genitourinary:  
Genitourinary Comments: Indwelling mike in place Musculoskeletal: Normal range of motion. She exhibits no edema, tenderness or deformity. Neurological: No cranial nerve deficit. Moving extremities spontaneously Skin: Skin is warm. No rash noted. She is not diaphoretic. Nursing note and vitals reviewed. Diagnostic Study Results Labs - Recent Results (from the past 12 hour(s)) URINALYSIS W/ REFLEX CULTURE Collection Time: 11/24/18 12:34 AM  
Result Value Ref Range Color DARK YELLOW Appearance CLOUDY (A) CLEAR Specific gravity 1.020 1.003 - 1.030    
 pH (UA) 6.0 5.0 - 8.0 Protein 100 (A) NEG mg/dL Glucose NEGATIVE  NEG mg/dL Ketone NEGATIVE  NEG mg/dL Blood MODERATE (A) NEG Urobilinogen 1.0 0.2 - 1.0 EU/dL Nitrites NEGATIVE  NEG Leukocyte Esterase LARGE (A) NEG    
 WBC  0 - 4 /hpf  
 RBC 0-5 0 - 5 /hpf Epithelial cells FEW FEW /lpf Bacteria 4+ (A) NEG /hpf  
 UA:UC IF INDICATED URINE CULTURE ORDERED (A) CNI Hyaline cast 0-2 0 - 5 /lpf Budding yeast PRESENT (A) NEG    
CBC WITH AUTOMATED DIFF Collection Time: 11/24/18 12:34 AM  
Result Value Ref Range WBC 9.0 4.1 - 11.1 K/uL  
 RBC 3.60 (L) 4.10 - 5.70 M/uL  
 HGB 11.4 (L) 12.1 - 17.0 g/dL HCT 32.4 (L) 36.6 - 50.3 % MCV 90.0 80.0 - 99.0 FL  
 MCH 31.7 26.0 - 34.0 PG  
 MCHC 35.2 30.0 - 36.5 g/dL  
 RDW 16.6 (H) 11.5 - 14.5 % PLATELET 293 (L) 722 - 400 K/uL MPV 10.9 8.9 - 12.9 FL  
 NRBC 0.0 0  WBC ABSOLUTE NRBC 0.00 0.00 - 0.01 K/uL NEUTROPHILS 90 (H) 32 - 75 % BAND NEUTROPHILS 8 % LYMPHOCYTES 1 (L) 12 - 49 % MONOCYTES 1 (L) 5 - 13 % EOSINOPHILS 0 0 - 7 % BASOPHILS 0 0 - 1 % IMMATURE GRANULOCYTES 0 0.0 - 0.5 % ABS. NEUTROPHILS 8.8 (H) 1.8 - 8.0 K/UL  
 ABS. LYMPHOCYTES 0.1 (L) 0.8 - 3.5 K/UL  
 ABS. MONOCYTES 0.1 0.0 - 1.0 K/UL  
 ABS. EOSINOPHILS 0.0 0.0 - 0.4 K/UL  
 ABS. BASOPHILS 0.0 0.0 - 0.1 K/UL  
 ABS. IMM. GRANS. 0.0 0.00 - 0.04 K/UL  
 DF AUTOMATED    
 RBC COMMENTS ANISOCYTOSIS 
1+ METABOLIC PANEL, COMPREHENSIVE Collection Time: 11/24/18 12:34 AM  
Result Value Ref Range Sodium 149 (H) 136 - 145 mmol/L Potassium 2.6 (LL) 3.5 - 5.1 mmol/L Chloride 115 (H) 97 - 108 mmol/L  
 CO2 29 21 - 32 mmol/L Anion gap 5 5 - 15 mmol/L Glucose 93 65 - 100 mg/dL BUN 34 (H) 6 - 20 MG/DL Creatinine 1.42 (H) 0.70 - 1.30 MG/DL  
 BUN/Creatinine ratio 24 (H) 12 - 20 GFR est AA 43 (L) >60 ml/min/1.73m2 GFR est non-AA 36 (L) >60 ml/min/1.73m2 Calcium 8.7 8.5 - 10.1 MG/DL Bilirubin, total 0.4 0.2 - 1.0 MG/DL  
 ALT (SGPT) 26 12 - 78 U/L  
 AST (SGOT) 24 15 - 37 U/L Alk. phosphatase 93 45 - 117 U/L Protein, total 6.6 6.4 - 8.2 g/dL Albumin 1.7 (L) 3.5 - 5.0 g/dL Globulin 4.9 (H) 2.0 - 4.0 g/dL A-G Ratio 0.3 (L) 1.1 - 2.2 MAGNESIUM Collection Time: 11/24/18 12:34 AM  
Result Value Ref Range Magnesium 1.9 1.6 - 2.4 mg/dL BILIRUBIN, CONFIRM Collection Time: 11/24/18 12:34 AM  
Result Value Ref Range Bilirubin UA, confirm NEGATIVE  NEG    
GLUCOSE, POC Collection Time: 11/24/18 12:56 AM  
Result Value Ref Range Glucose (POC) 96 65 - 100 mg/dL Performed by Lisa Carver GLUCOSE, POC Collection Time: 11/24/18  2:33 AM  
Result Value Ref Range Glucose (POC) 93 65 - 100 mg/dL Performed by Lisa Carver CULTURE, BLOOD, PAIRED Collection Time: 11/24/18  2:45 AM  
Result Value Ref Range Special Requests: NO SPECIAL REQUESTS Culture result: NO GROWTH AFTER 2 HOURS    
LACTIC ACID Collection Time: 11/24/18  2:58 AM  
Result Value Ref Range Lactic acid 1.3 0.4 - 2.0 MMOL/L Radiologic Studies -  
 
CT Results  (Last 48 hours)  
          
 11/24/18 0047  CT HEAD WO CONT Final result Impression:  IMPRESSION:   
No acute intracranial abnormality. Narrative:  EXAM:  CT HEAD WO CONT INDICATION: Altered mental status. COMPARISON: CT 10/18/2018 TECHNIQUE: Axial noncontrast head CT from foramen magnum to vertex. Coronal and  
sagittal reformatted images were obtained. CT dose reduction was achieved  
through use of a standardized protocol tailored for this examination and  
automatic exposure control for dose modulation. FINDINGS:  There is diffuse age-related parenchymal volume loss. The ventricles  
and sulci are age-appropriate without hydrocephalus. There is no mass effect or  
midline shift. There is no intracranial hemorrhage or extra-axial fluid  
collection. Areas of low attenuation in the periventricular white matter  
represent stable chronic microvascular ischemic changes. The gray-white matter  
differentiation is maintained. The basal cisterns are patent. The osseous structures are intact. The visualized paranasal sinuses and mastoid  
air cells are clear. CXR Results  (Last 48 hours)  
          
 11/24/18 0218  XR CHEST PORT Final result Impression:  IMPRESSION: No acute process. Stable diffuse interstitial markings that may  
represent chronic interstitial lung disease. Narrative:  EXAM:  CR chest portable INDICATION:  Altered mental status COMPARISON: 10/18/2018. TECHNIQUE: Portable AP semiupright chest view at 0205 hours FINDINGS: The cardiomediastinal contours are stable. There is stable diffuse interstitial markings. There is no focal airspace opacity, pleural effusion, or  
pneumothorax. The bones and upper abdomen are stable. Medical Decision Making I am the first provider for this patient. I reviewed the vital signs, available nursing notes, past medical history, past surgical history, family history and social history. Vital Signs-Reviewed the patient's vital signs. Patient Vitals for the past 12 hrs: 
 Temp Pulse Resp BP SpO2  
11/24/18 0842 (!) 93.4 °F (34.1 °C)      
11/24/18 0800  78  (!) 133/113 93 % 11/24/18 0724  87  (!) 130/105 94 % 11/24/18 0715 (!) 91.5 °F (33.1 °C) 64 18 (!) 130/105 95 % 11/24/18 0707  72  104/74 95 % 11/24/18 0704 (!) 91 °F (32.8 °C) 70 18 104/74   
11/24/18 0701 97 °F (36.1 °C)      
11/24/18 0646  72  105/65 94 % 11/24/18 0500  62  94/76 96 % 11/24/18 0451  65  112/72 96 % 11/24/18 0400    130/90 97 % 11/24/18 0343 (!) 89.3 °F (31.8 °C) 61 16 134/79   
11/24/18 0232    (!) 87/69 97 % 11/24/18 0230    (!) 72/59 97 % 11/24/18 0218    115/78   
11/24/18 0159  (!) 57 18 148/73   
11/24/18 0145    107/73 98 % 11/24/18 0024 97.3 °F (36.3 °C) 83  114/82 97 % Pulse Oximetry Analysis - 100% on RA Cardiac Monitor:  
Rate: 62 bpm 
 
Records Reviewed: Nursing Notes, Old Medical Records, Previous electrocardiograms, Previous Radiology Studies and Previous Laboratory Studies Provider Notes (Medical Decision Making):  
Patient presenting with altered mental status. Pt has stable vitals and POC glucose was checked immediately upon arrival. DDx: medication toxicity, infection, anemia, electrolyte/metabolic anomoly, hypercapnea, stroke/bleed/mass, dehydration, illicit drug intoxication. Will obtain labwork, UA, EKG and CT imaging of the head, chest xray.  Will consider adding toxicologic workup if history unclear or warrants further investigation of toxic source. Will continue to monitor and reassess for admission. ED Course:  
Initial assessment performed. The patients presenting problems have been discussed, and they are in agreement with the care plan formulated and outlined with them. I have encouraged them to ask questions as they arise throughout their visit. Medications Administered during ED Management: 
Medications  
sodium chloride (NS) flush 5-10 mL (10 mL IntraVENous Given 11/24/18 0939)  
sodium chloride (NS) flush 5-10 mL (not administered)  
heparin (porcine) injection 5,000 Units (5,000 Units SubCUTAneous Given 11/24/18 0934) cefTRIAXone (ROCEPHIN) 1 g in 0.9% sodium chloride (MBP/ADV) 50 mL (not administered)  
albuterol-ipratropium (DUO-NEB) 2.5 MG-0.5 MG/3 ML (not administered) dextrose 5% - 0.45% NaCl with KCl 20 mEq/L infusion (75 mL/hr IntraVENous New Bag 11/24/18 0937) potassium chloride (K-DUR, KLOR-CON) SR tablet 40 mEq (40 mEq Oral Given 11/24/18 0149) cefTRIAXone (ROCEPHIN) 1 g in 0.9% sodium chloride (MBP/ADV) 50 mL (0 g IntraVENous IV Completed 11/24/18 0605)  
sodium chloride 0.9 % bolus infusion 1,000 mL (0 mL IntraVENous IV Completed 11/24/18 0706) I reviewed our electronic medical record system for any past medical records that were available that may contribute to the patients current condition, the nursing notes and vital signs from today's visit. Hung Yo MD 
 
ED Course as of Nov 24 1006 Sat Nov 24, 2018  
0122 RN informed me that K 2.6; will replete. [HW] 0230 Notified by RN that pt is now hypotensive, will give IVF bolus; will start continuous IVF's, send blood cultures and lactic acid to r/o sepsis; pt being treated for UTI.  [HW] 1746 Rectal temp 89.3, will place hallie hagger. Higher concern for sepsis now. [HW] ED Course User Index 
[HW] Andrew Rhoades MD  
 
3:45 AM - I suspect that this patient has an active infection. 3:45 AM - The patient met criteria for severe sepsis at this time. PROVIDER SEPSIS PHYSICAL EXAM EVAL Vital signs reviewed (see nursing documentation for further details): Vitals:  
 11/24/18 0715 11/24/18 6802 11/24/18 0800 11/24/18 6831 BP: (!) 130/105 (!) 130/105 (!) 133/113 Pulse: 64 87 78 Resp: 18 Temp: (!) 91.5 °F (33.1 °C)   (!) 93.4 °F (34.1 °C) SpO2: 95% 94% 93% Cardiac exam:Regular Rate Pulmonary exam:Normal 
Peripheral pulses:Diminished Capillary refill:Delayed Skin exam:pink Exam performed Fior Ny MD 
 
SEP-1 Core Measure Exclusion Criteria Pt No exclusion criteria Has the patient/family refused IV fluids? no 
 
CONSULT NOTE:  
4:24 AM 
Kush Maynard MD spoke with Dr. Kandace Ramos, Specialty: Hospitalist 
Discussed pt's hx, disposition, and available diagnostic and imaging results. Reviewed care plans. Consultant will evaluate pt for admission. Written by Sweetie Garland, ED Scribe, as dictated by Kush Maynard MD. 
 
Progress Note 5:30 AM 
Temperature improving on Manjinder Hugger; notified by RN pt remains hypotensive, will give additional 1L IVF bolus, start maintenance IVF's and reassess. Will need to change out Mendoza; will need admission for severe sepsis. CRITICAL CARE NOTE : 
 
6:00AM 
 
IMPENDING DETERIORATION -Airway, Respiratory, Cardiovascular, CNS, Metabolic and Renal 
ASSOCIATED RISK FACTORS - Hypotension, Shock, Dysrhythmia, Metabolic changes and CNS Decompensation MANAGEMENT- Bedside Assessment and Supervision of Care INTERPRETATION -  Xrays, CT Scan, Blood Gases, ECG, Blood Pressure and Cardiac Output Measures INTERVENTIONS - hemodynamic mngmt, Neurologic interventions , Metobolic interventions and management of severe sepsis, severe hypothermia, ELISABETH requiring fluid resuscitation, frequent reassessments and monitoring CASE REVIEW - Hospitalist, Nursing and Family TREATMENT RESPONSE -Stable PERFORMED BY - Self NOTES   : 
 
 I have spent 70 minutes of critical care time involved in lab review, consultations with specialist, family decision- making, bedside attention and documentation. During this entire length of time I was immediately available to the patient . Critical Care: The reason for providing this level of medical care for this critically ill patient was due to a critical illness that impaired one or more vital organ systems, such that there was a high probability of imminent or life threatening deterioration in the patient's condition. This care involved high complexity decision making to assess, manipulate, and support vital system functions, to treat this degree of vital organ system failure, and to prevent further life threatening deterioration of the patients condition. Disposition: 
6:00 AM 
Patient is being admitted to the hospital. The results of their tests and reasons for their admission have been discussed with them and/or available family. They convey agreement and understanding for the need to be admitted and for their admission diagnosis. Consultation has been made with the inpatient physician specialist for hospitalization. PLAN: 
1. Admit to hospitalist.  
 
Return to ED if worse Diagnosis Clinical Impression: 1. Severe sepsis ([address] 2. ELISABETH (acute kidney injury) ([address] 3. Urinary tract infection without hematuria, site unspecified 4. Acute hypokalemia 5. Acute metabolic encephalopathy 6. Hypotension, unspecified hypotension type 7. Hypothermia, initial encounter 8. Acute hypernatremia Attestations This note is prepared by David Hicks, acting as Scribe for MD Mayte White MD : The scribe's documentation has been prepared under my direction and personally reviewed by me in its entirety.  I confirm that the note above accurately reflects all work, treatment, procedures, and medical decision making performed by me. 
 
: 
 This note will not be viewable in [address] [place]

## 2018-11-24 NOTE — ED NOTES
Verbal orders given by Dr. Brenton Kraus to start ordered ian-synephrine peripherally until central line is placed. Pharmacy called to send ordered drip ASAP.

## 2018-11-25 NOTE — PROGRESS NOTES
[facility], Critical Care, and Sleep Medicine Name: Eloy Moser MRN: [id number] : 1935 Hospital: [place] [facility] Date: 2018 IMPRESSION:  
· Septic shock · UTI with chronic mike · Hypothermia · Acute renal failure · Severe volume contraction · Hypernatremia · COPD without exacerbation · Dementia · Hypothyroidism PLAN:  
· O2 as needed · IV fluids - adjust, start hypotonic fluids · IV antibiotics pending culture data · Pressors currently off · Follow up cultures · Bronchodilators PRN 
· DVT prophylaxis · DNR 
· Hemodynamically stable, on room air. Transfer out of ICU. Subjective/Interval History:  
I have reviewed the flowsheet and previous days notes. The patient is unable to give any meaningful history or review of systems because the patient is: 
Lethargic - weaned off of pressors overnight, still provides no history Review of Systems Unable to perform ROS: Mental status change Objective: 
Vital Signs:   
Visit Vitals /64 Pulse 78 Temp 96.7 °F (35.9 °C) Resp 21 Wt 64.9 kg (143 lb 1.3 oz) SpO2 95% BMI 19.40 kg/m² O2 Device: Room air O2 Flow Rate (L/min): 1 l/min Temp (24hrs), Av.6 °F (35.9 °C), Min:93.4 °F (34.1 °C), Max:97.7 °F (36.5 °C) Intake/Output:  
Last shift:       07 - 1900 In: 60.4 [I.V.:60.4] Out: 70 [Urine:70] Last 3 shifts: 1901 -  0700 In: 3669.9 [I.V.:3669.9] Out: 505 [HEFMK:657] Intake/Output Summary (Last 24 hours) at 2018 9745 Last data filed at 2018 7849 Gross per 24 hour Intake 2680.32 ml Output 575 ml Net 2105.32 ml Physical Exam  
Constitutional: He appears lethargic. He appears ill. No distress. HENT:  
Head: Normocephalic and atraumatic. Mouth/Throat: Mucous membranes are dry. No oropharyngeal exudate. Eyes: No scleral icterus. Cardiovascular: Normal rate and regular rhythm. Pulmonary/Chest: No respiratory distress. He has no wheezes. He has no rales. Abdominal: Soft. Bowel sounds are normal. He exhibits no distension. There is no tenderness. Musculoskeletal: He exhibits no edema. Neurological: He appears lethargic. Skin: Skin is warm and dry. No rash noted. Data:  
 
Current Facility-Administered Medications Medication Dose Route Frequency  sodium chloride (NS) flush 5-10 mL  5-10 mL IntraVENous Q8H  
 heparin (porcine) injection 5,000 Units  5,000 Units SubCUTAneous Q8H  
 NOREPINephrine (LEVOPHED) 8 mg in dextrose 5% 250 mL infusion  0-200 mcg/min IntraVENous TITRATE  lactated Ringers infusion  125 mL/hr IntraVENous CONTINUOUS  
 vancomycin (VANCOCIN) 750 mg in 0.9% sodium chloride 250 mL ([id number])  750 mg IntraVENous Q16H  
 cefepime (MAXIPIME) 2 g in 0.9% sodium chloride (MBP/ADV) 100 mL  2 g IntraVENous Q12H  mupirocin (BACTROBAN) 2 % ointment   Both Nostrils BID Labs: 
Recent Labs  
  11/25/18 
4017 11/24/18 
0034 WBC 9.6 9.0 HGB 8.9* 11.4* HCT 26.3* 32.4*  
* 121* Recent Labs  
  11/25/18 
0333 11/24/18 
1215 11/24/18 
0034 * 153* 149*  
K 3.0* 3.5 2.6*  
* 122* 115* CO2 22 23 29 GLU 65 70 93 BUN 30* 31* 34* CREA 1.24 1.18 1.42* CA 7.6* 7.7* 8.7 MG 1.6  --  1.9 PHOS 2.1*  --   --   
ALB  --   --  1.7* TBILI  --   --  0.4 SGOT  --   --  24 ALT  --   --  26 No results for input(s): PH, PCO2, PO2, HCO3, FIO2 in the last 72 hours. Imaging: 
I have personally reviewed the patients radiographs and have reviewed the reports: 
None today Total critical care time exclusive of procedures:  minutes Luz Maria Dickerson MD

## 2018-11-25 NOTE — PROGRESS NOTES
1915  Received bedside/verbal report and assumed care of patient from Thor Yoo RN. Drips verified: LR at 125 mL/hr. 2000  Shift assessment completed. See doc flowsheet for details. Patient opens eyes spontaneously, moves all extremities feebly, but does not follow any commands. He shirks away from stimuli, even light touch, and has a high startle reflex. Non verbal, but moans when stimulated. Patient's heels are very red and boggy, but blanchable. Floated heels on pillows. Bilateral lower extremities are contracted and rigid. Patient also has redness and moisture under his scrotum, cleaned with soap and dried well. Petechiae and scratches/scabs noted to bilateral legs and feet. Unable to complete patient's admission database due to his neurological status. 2300  Weaning patient's oxygen as tolerated. Oxygen saturation in the high 90's. Will continue to monitor. 0000  Urine output has been 10-20 mL/hr. Submitted electronic request to tele hospitalist to notify. 0005  Weaned to room air. Iván Frias returned page. MD ordered bolus. 5305  Blood sugar on labs 65. Finger stick blood sugar 71. Patient is not diabetic and is not on anti-hyperglycemic, no intervention needed. 0715  Bedside and Verbal shift change report given to Bernardino Mendieta (oncoming nurse) by Nafisa Villalba (offgoing nurse). Report included the following information SBAR, Kardex, ED Summary, Intake/Output, MAR, Recent Results and Cardiac Rhythm NSR. Drips verified: LR at 125 mL/hr.

## 2018-11-25 NOTE — PROGRESS NOTES
**Consult Information** 
Member Facility: [address] [place] [address],[address] Facility MRN: [id number] Consult ID: [id number] Facility Time Zone: ET 
Date and Time of Consult: 11/25/2018 12:20:25 AM 
Requesting Clinician: Oswaldo Peak Time of Call : 11/25/2018 12:25:00 AM 
Patient Name: Lexi Wadsworth YOB: 1935 Gender: Male **Clinical Note** Clinical Note: Ordered 250mls bolus NS for low urine output

## 2018-11-25 NOTE — PROGRESS NOTES
Problem: Falls - Risk of 
Goal: *Absence of Falls Document Jackelyn Elizalde Fall Risk and appropriate interventions in the flowsheet. Outcome: Progressing Towards Goal 
Fall Risk Interventions: 
Mobility Interventions: Bed/chair exit alarm Mentation Interventions: Adequate sleep, hydration, pain control, Bed/chair exit alarm, Door open when patient unattended, Evaluate medications/consider consulting pharmacy, Update white board, Toileting rounds, Room close to nurse's station, Reorient patient, More frequent rounding, Increase mobility Medication Interventions: Bed/chair exit alarm, Evaluate medications/consider consulting pharmacy Elimination Interventions: Bed/chair exit alarm, Call light in reach Problem: Pressure Injury - Risk of 
Goal: *Prevention of pressure injury Document Bill Scale and appropriate interventions in the flowsheet. Outcome: Progressing Towards Goal 
Pressure Injury Interventions: 
Sensory Interventions: Assess changes in LOC, Assess need for specialty bed, Avoid rigorous massage over bony prominences, Check visual cues for pain, Float heels, Keep linens dry and wrinkle-free, Maintain/enhance activity level, Minimize linen layers, Monitor skin under medical devices, Pad between skin to skin, Pressure redistribution bed/mattress (bed type), Turn and reposition approx. every two hours (pillows and wedges if needed) Moisture Interventions: Apply protective barrier, creams and emollients, Absorbent underpads, Assess need for specialty bed, Check for incontinence Q2 hours and as needed, Internal/External urinary devices, Maintain skin hydration (lotion/cream), Moisture barrier, Minimize layers Activity Interventions: Pressure redistribution bed/mattress(bed type), Assess need for specialty bed Mobility Interventions: Assess need for specialty bed, Float heels, HOB 30 degrees or less, Pressure redistribution bed/mattress (bed type), Turn and reposition approx. every two hours(pillow and wedges) Nutrition Interventions: Document food/fluid/supplement intake, Discuss nutritional consult with provider Friction and Shear Interventions: Apply protective barrier, creams and emollients, Foam dressings/transparent film/skin sealants, HOB 30 degrees or less, Lift sheet, Lift team/patient mobility team, Minimize layers, Transferring/repositioning devices

## 2018-11-25 NOTE — PROGRESS NOTES
Hospitalist Progress Note NAME: Melly Liu :  1935 MRN:  [id number] Assessment / Plan: 
Septic shock 2/2 UTI resolved AMS most likely toxic metabolic encephalopathy in setting sepsis POA Severe sepsis 2/2  Complicated UTI in setting of indwelling mike POA Acute respiratory failure with hypoxia poa  
hypothermia ELISABETH most likely prerenal  
Hypernatremia Hypokalemia 
hypophosphatemia 
- started levophed through central line 0n  
- off Pressors today  
-CT scan of the brain was negative, keep NPO while lethargic, speech eval   
- last urine culture in 2018 grew Klebsiella oxytoca and morganella Morgani sensitive to ceftriaxone. - new urine culture growing pseudomonas and 2nd one - Abx switched to cefepime and vanco  
Continue with as needed IV fluid boluses if systolic blood pressure less than 90 Continue with D5 half-normal saline with K,  keep n.p.o. while lethargic 
-Continue with Manjinder hugger until temp reached 36.5 Daily BMP 
 blood cultures negative Mike changed in the ED 
- We will get urine electrolytes,  
renal ultrasound showed : 
1. No hydronephrosis or other sonographic abnormality of the kidneys. 2. Infrarenal abdominal aortic aneurysm measuring 4.6 x 4.3 cm 
  
- K repleted and phos Hypertension 
hld Hypothyroidism Hold all BP meds Hold atorvastatin Restart levothyroxine once able to tolerate p.o. 
  
H/o cva Dementia Depression Hold sertraline  
  
Copd Stable , c/w prn duonebs No home inhalers  
  
 
DVT prophylaxis Heparin CODE STATUS  
DNR , called Nok on chart Jcarlos Cottrell at [phone] ,[facility] , Pt is DNR Pt is critically ill , will get palliative involved Disposition TBD 
  
Code Status: DNR ( ACP on chart) DVT Prophylaxis: Hep SQ 
GI Prophylaxis: not indicated Body mass index is 19.4 kg/m². therefore classifying patient as normal weight Code status: DNR Prophylaxis: Hep SQ Recommended Disposition: SNF/LTC Subjective: Chief Complaint / Reason for Physician Visit \"FU severe sepsis\". Discussed with RN events overnight. Review of Systems: 
Symptom Y/N Comments  Symptom Y/N Comments Fever/Chills    Chest Pain Poor Appetite    Edema Cough    Abdominal Pain Sputum    Joint Pain SOB/WEBSTER    Pruritis/Rash Nausea/vomit    Tolerating PT/OT Diarrhea    Tolerating Diet Constipation    Other Could NOT obtain due to: Lethargy Objective: VITALS:  
Last 24hrs VS reviewed since prior progress note. Most recent are: 
Patient Vitals for the past 24 hrs: 
 Temp Pulse Resp BP SpO2  
11/25/18 1601 97.7 °F (36.5 °C)      
11/25/18 1600  72 19 112/60 95 % 11/25/18 1530 97.7 °F (36.5 °C) 69 20 104/56 95 % 11/25/18 1500  68 20 99/59 95 % 11/25/18 1430  68 17 108/53 96 % 11/25/18 1400  72 20 95/62 96 % 11/25/18 1330  72 18 113/56 96 % 11/25/18 1300  75 21 112/63 96 % 11/25/18 1230  74 22 97/56 96 % 11/25/18 1200 97 °F (36.1 °C) 76 15 103/63 96 % 11/25/18 1130  71 16 96/51 96 % 11/25/18 1100  73 20 109/54 97 % 11/25/18 1030  77 18 110/69 96 % 11/25/18 1000  78 21 108/61 94 % 11/25/18 0930  76 17 112/57 94 % 11/25/18 0900  76 12 119/59 93 % 11/25/18 0830  79 (!) 4 104/60 94 % 11/25/18 0808  78 21 150/64 95 % 11/25/18 0800 96.7 °F (35.9 °C) 76 16 168/73 95 % 11/25/18 0730  79 12 122/73 98 % 11/25/18 0700  76 20 137/77 96 % 11/25/18 0630  75 20 133/50 96 % 11/25/18 0600  77 9 116/59 97 % 11/25/18 0530  78 18 108/80 96 % 11/25/18 0500  81 14 104/61 95 % 11/25/18 0430  79 20 104/58 96 % 11/25/18 0400 96.4 °F (35.8 °C) 79  131/52 96 % 11/25/18 0330  78 14 144/48 97 % 11/25/18 0300  79 20 94/53 95 % 11/25/18 0230  80 18 90/50 93 % 11/25/18 0200  78 22 109/58 95 % 11/25/18 0130  79 18 119/59 96 % 11/25/18 0100  81 24 121/65 96 % 11/25/18 0030  76 20 155/64 94 % 11/25/18 0000 97.7 °F (36.5 °C) 77 20 106/55 95 % 11/24/18 2330  75 20 113/55 96 % 11/24/18 2300  75 17 100/60 98 % 11/24/18 2230  79 16 107/52 97 % 11/24/18 2200  73 14 105/50 98 % 11/24/18 2130  80 12 (!) 101/35 98 % 11/24/18 2100  82 11 121/64 98 % 11/24/18 2030  84 20 (!) 89/68 98 % 11/24/18 2000 97.1 °F (36.2 °C) 75 22 103/53 95 % 11/24/18 1930  78 12 96/52 95 % 11/24/18 1900  75 15 92/61 96 % 11/24/18 1845  75 (!) 4 94/54 96 % 11/24/18 1830  79 8 96/61 96 % 11/24/18 1815  80 15 115/58 96 % 11/24/18 1800  80 20 130/66 96 % 11/24/18 1745  79 20 116/64 96 % 11/24/18 1739 97.7 °F (36.5 °C) 80 21 123/71 96 % Intake/Output Summary (Last 24 hours) at 11/25/2018 1724 Last data filed at 11/25/2018 1500 Gross per 24 hour Intake 4528.73 ml Output 925 ml Net 3603.73 ml PHYSICAL EXAM: 
General: WD, WN. Alert, cooperative, no acute distress   
EENT:  EOMI. Anicteric sclerae. MMM Resp:  CTA bilaterally, no wheezing or rales. No accessory muscle use CV:  Regular  rhythm,  No edema GI:  Soft, Non distended, Non tender.  +Bowel sounds Neurologic:  Alert and oriented X 0 normal speech, Psych:   Unable to assess Skin:  Petechiae on ankles and feet. No jaundice Reviewed most current lab test results and cultures  YES Reviewed most current radiology test results   YES Review and summation of old records today    NO Reviewed patient's current orders and MAR    YES 
PMH/SH reviewed - no change compared to H&P 
________________________________________________________________________ Care Plan discussed with: 
  Comments Patient x Family RN x Care Manager Consultant Multidiciplinary team rounds were held today with , nursing, pharmacist and clinical coordinator. Patient's plan of care was discussed; medications were reviewed and discharge planning was addressed. ________________________________________________________________________ Total NON critical care TIME:  20   Minutes Total CRITICAL CARE TIME Spent:   Minutes non procedure based Comments >50% of visit spent in counseling and coordination of care x As above   
________________________________________________________________________ Dorita Tavarez MD  
 
Procedures: see electronic medical records for all procedures/Xrays and details which were not copied into this note but were reviewed prior to creation of Plan. LABS: 
I reviewed today's most current labs and imaging studies. Pertinent labs include: 
Recent Labs  
  11/25/18 0333 11/24/18 0034 WBC 9.6 9.0 HGB 8.9* 11.4* HCT 26.3* 32.4*  
* 121* Recent Labs  
  11/25/18 0333 11/24/18 1215 11/24/18 0034 * 153* 149*  
K 3.0* 3.5 2.6*  
* 122* 115* CO2 22 23 29 GLU 65 70 93 BUN 30* 31* 34* CREA 1.24 1.18 1.42* CA 7.6* 7.7* 8.7 MG 1.6  --  1.9 PHOS 2.1*  --   --   
ALB  --   --  1.7* TBILI  --   --  0.4 SGOT  --   --  24 ALT  --   --  26 Signed: Dorita Tavarez MD

## 2018-11-25 NOTE — PROGRESS NOTES
Received report from Kelsie FERNANDEZ 
0800: Patient oral suctioned. Copious amount of yellow green secretions obtained from back of throat/ mouth. Dried mucous clean from mouth. 1000: Patient tunred on right side. Oral suctioned, scant white secretions obtained. Patient opens eyes to stimulus. Still inaudible sounds. 1600: Report given to [facility].

## 2018-11-26 NOTE — PROGRESS NOTES
General Surgery End of Shift Nursing Note Bedside shift change report given to Chris Garza (oncoming nurse) by Navneet Figueroa RN (offgoing nurse). Report included the following information SBAR, Kardex and Recent Results. Shift worked:   7p-7a Summary of shift:    Aphasic. Respond to voice only. Issues for physician to address:   0 Number times ambulated in hallway past shift: 0 Number of times OOB to chair past shift: 0 Pain Management: 
Current medication: 0 Patient states pain is manageable on current pain medication: NO 
 
GI: 
 
Current diet:  DIET NPO Tolerating current diet: YES Passing flatus: YES Last Bowel Movement: today Appearance: soft mucous Respiratory: 
 
Incentive Spirometer at bedside: NO 
Patient instructed on use: NO 
 
Patient Safety: 
 
Falls Score: 4 Bed Alarm On? Yes Sitter?  No 
 
Otis Mcmahon RN

## 2018-11-26 NOTE — PROGRESS NOTES
Problem: Falls - Risk of 
Goal: *Absence of Falls Document [place] Jerel Fall Risk and appropriate interventions in the flowsheet. Fall Risk Interventions: 
Mobility Interventions: Bed/chair exit alarm, OT consult for ADLs, Patient to call before getting OOB, PT Consult for mobility concerns, PT Consult for assist device competence Mentation Interventions: Door open when patient unattended, More frequent rounding Medication Interventions: Bed/chair exit alarm, Patient to call before getting OOB Elimination Interventions: Bed/chair exit alarm, Call light in reach, Patient to call for help with toileting needs Problem: Patient Education: Go to Patient Education Activity Goal: Patient/Family Education Outcome: Progressing Towards Goal 
Call bell within reach, siderails upx3. Turning Q2h.

## 2018-11-26 NOTE — PROGRESS NOTES
O2 sats 58%. RRT called. Patient now on NRB (15L). Patient is not responsive (baseline). DNR. IVF infusing. Vital signs stable. Mendoza catheter draining clear, yellow urine.

## 2018-11-26 NOTE — INTERDISCIPLINARY ROUNDS
0855:Upon arrival to patients room, 02 saturation 77 on room air. NRB mask  placed on patient. Rapid response team at bedside  
 
0901 02 saturation 91 after intervention, patient responding to voice, /72. Patient will remain on floor. Palliative will be following patient

## 2018-11-26 NOTE — PROGRESS NOTES
TRANSFER - OUT REPORT: 
 
Verbal report given to Saibna Tian Whaley RN (name) on [facility]  being transferred to Surgical telemetry(unit) for routine progression of care Report consisted of patients Situation, Background, Assessment and  
Recommendations(SBAR). Information from the following report(s) SBAR, Kardex, ED Summary, OR Summary, Intake/Output, MAR, Recent Results and Cardiac Rhythm NSR was reviewed with the receiving nurse. Lines:  
Peripheral IV 11/24/18 Right Hand (Active) Site Assessment Clean, dry, & intact 11/25/2018 11:57 AM  
Phlebitis Assessment 0 11/25/2018 11:57 AM  
Infiltration Assessment 0 11/25/2018 11:57 AM  
Dressing Status Clean, dry, & intact 11/25/2018 11:57 AM  
Dressing Type Tape;Transparent 11/25/2018 11:57 AM  
Hub Color/Line Status Pink;Patent;Capped 11/25/2018 11:57 AM  
Action Taken Open ports on tubing capped 11/25/2018  4:00 AM  
Alcohol Cap Used Yes 11/25/2018 11:57 AM  
   
Peripheral IV 11/24/18 Left Arm (Active) Site Assessment Clean, dry, & intact 11/25/2018 11:57 AM  
Phlebitis Assessment 0 11/25/2018 11:57 AM  
Infiltration Assessment 0 11/25/2018 11:57 AM  
Dressing Status Clean, dry, & intact 11/25/2018 11:57 AM  
Dressing Type Tape;Transparent 11/25/2018 11:57 AM  
Hub Color/Line Status Pink;Patent;Capped 11/25/2018 11:57 AM  
Action Taken Open ports on tubing capped 11/25/2018  4:00 AM  
Alcohol Cap Used Yes 11/25/2018 11:57 AM  
   
Peripheral IV 11/24/18 Right Antecubital (Active) Site Assessment Clean, dry, & intact 11/25/2018 11:57 AM  
Phlebitis Assessment 0 11/25/2018 11:57 AM  
Infiltration Assessment 0 11/25/2018 11:57 AM  
Dressing Status Clean, dry, & intact 11/25/2018 11:57 AM  
Dressing Type Tape;Transparent 11/25/2018 11:57 AM  
Hub Color/Line Status Green;Patent;Capped 11/25/2018 11:57 AM  
Action Taken Open ports on tubing capped 11/25/2018  4:00 AM  
Alcohol Cap Used Yes 11/25/2018 11:57 AM  
  
 
 Opportunity for questions and clarification was provided. Patient transported with: 
 Monitor Registered Nurse Tech

## 2018-11-26 NOTE — PROGRESS NOTES
Hospitalist Progress Note NAME: Billy Black :  1935 MRN:  [id number] Assessment / Plan: 
Septic shock 2/2 UTI resolved AMS most likely toxic metabolic encephalopathy in setting sepsis POA Severe sepsis 2/2  Complicated UTI in setting of indwelling mike POA Acute respiratory failure with hypoxia poa  
hypothermia ELISABETH most likely prerenal  
Hypernatremia Hypokalemia Hypophosphatemia 
- rapid response today because of hypoxia 2/2 lot of secretions , sat came up with NRB mask  
- DG to Tele since yesterday  
- started levophed through central line 0n  
- off Pressors on  
-CT scan of the brain was negative, keep NPO while lethargic, speech eval   
- last urine culture in  grew Klebsiella oxytoca and morganella Morgani sensitive to ceftriaxone. - new urine culture growing pseudomonas and 2nd one - Abx switched to cefepime and vanco  
Continue with as needed IV fluid boluses if systolic blood pressure less than 90 Continue with D5 half-normal saline with K,  keep n.p.o. while lethargic 
-Daily BMP 
 blood cultures negative Mike changed in the ED 
- We will get urine electrolytes,  
renal ultrasound showed : 
1. No hydronephrosis or other sonographic abnormality of the kidneys. 2. Infrarenal abdominal aortic aneurysm measuring 4.6 x 4.3 cm 
  
- K repleted and phos Hypertension 
hld Hypothyroidism Hold all BP meds Hold atorvastatin Restart levothyroxine once able to tolerate p.o. 
  
H/o cva Dementia Depression Hold sertraline  
  
Copd Stable , c/w prn duonebs No home inhalers  
  
 
DVT prophylaxis Heparin CODE STATUS  
DNR , called Nok on chart Evon Polanco at [phone] ,[facility] called back , Pt is DNR Pt is critically ill , will get palliative involved Disposition TBD 
  
Code Status: DNR ( ACP on chart) DVT Prophylaxis: Hep SQ 
GI Prophylaxis: not indicated Body mass index is 19.4 kg/m². therefore classifying patient as normal weight Code status: DNR Prophylaxis: Hep SQ Recommended Disposition: SNF/LTC Subjective: Chief Complaint / Reason for Physician Visit \"FU severe sepsis\". Discussed with RN events overnight. Review of Systems: 
Symptom Y/N Comments  Symptom Y/N Comments Fever/Chills    Chest Pain Poor Appetite    Edema Cough    Abdominal Pain Sputum    Joint Pain SOB/WEBSTER    Pruritis/Rash Nausea/vomit    Tolerating PT/OT Diarrhea    Tolerating Diet Constipation    Other Could NOT obtain due to: Lethargy Objective: VITALS:  
Last 24hrs VS reviewed since prior progress note. Most recent are: 
Patient Vitals for the past 24 hrs: 
 Temp Pulse Resp BP SpO2  
11/26/18 0358 97.4 °F (36.3 °C) 74 19 117/56 94 % 11/26/18 0028 97.1 °F (36.2 °C) 73 19 121/57 96 % 11/26/18 0000  70 19 114/64 96 % 11/25/18 2200  66 13 114/62 95 % 11/25/18 2100  75 20 118/65 96 % 11/25/18 2000 97.1 °F (36.2 °C) 75 19 114/65 95 % 11/25/18 1800 97.5 °F (36.4 °C) 71 18 99/57 95 % 11/25/18 1730  66 18 103/62 96 % 11/25/18 1700  67 18 108/58 95 % 11/25/18 1630 97.7 °F (36.5 °C) 69 17 103/56 95 % 11/25/18 1601 97.7 °F (36.5 °C)      
11/25/18 1600  72 19 112/60 95 % 11/25/18 1530 97.7 °F (36.5 °C) 69 20 104/56 95 % 11/25/18 1500  68 20 99/59 95 % 11/25/18 1430  68 17 108/53 96 % 11/25/18 1400  72 20 95/62 96 % 11/25/18 1330  72 18 113/56 96 % 11/25/18 1300  75 21 112/63 96 % 11/25/18 1230  74 22 97/56 96 % 11/25/18 1200 97 °F (36.1 °C) 76 15 103/63 96 % 11/25/18 1130  71 16 96/51 96 % 11/25/18 1100  73 20 109/54 97 % 11/25/18 1030  77 18 110/69 96 % 11/25/18 1000  78 21 108/61 94 % 11/25/18 0930  76 17 112/57 94 % 11/25/18 0900  76 12 119/59 93 % 11/25/18 0830  79 (!) 4 104/60 94 % 11/25/18 0808  78 21 150/64 95 % 11/25/18 0800 96.7 °F (35.9 °C) 76 16 168/73 95 % Intake/Output Summary (Last 24 hours) at 11/26/2018 0108 Last data filed at 11/26/2018 2163 Gross per 24 hour Intake 3381.75 ml Output 1050 ml Net 2331.75 ml PHYSICAL EXAM: 
General: WD, WN. Unresponsive EENT:  EOMI. Anicteric sclerae. MMM Resp:  CTA bilaterally, no wheezing or rales. No accessory muscle use CV:  Regular  rhythm,  No edema GI:  Soft, Non distended, Non tender.  +Bowel sounds Neurologic:  Alert and oriented X 0 normal speech, Psych:   Unable to assess Skin:  Petechiae on ankles and feet. No jaundice Reviewed most current lab test results and cultures  YES Reviewed most current radiology test results   YES Review and summation of old records today    NO Reviewed patient's current orders and MAR    YES 
PMH/SH reviewed - no change compared to H&P 
________________________________________________________________________ Care Plan discussed with: 
  Comments Patient x Family RN x Care Manager Consultant Multidiciplinary team rounds were held today with , nursing, pharmacist and clinical coordinator. Patient's plan of care was discussed; medications were reviewed and discharge planning was addressed. ________________________________________________________________________ Total NON critical care TIME:  40  Minutes Total CRITICAL CARE TIME Spent:   Minutes non procedure based Comments >50% of visit spent in counseling and coordination of care x As above   
________________________________________________________________________ Gorge Almeida MD  
 
Procedures: see electronic medical records for all procedures/Xrays and details which were not copied into this note but were reviewed prior to creation of Plan. LABS: 
I reviewed today's most current labs and imaging studies. Pertinent labs include: 
Recent Labs  
  11/26/18 
0346 11/25/18 
0333 11/24/18 
0034 WBC 9.6 9.6 9.0 HGB 9.3* 8.9* 11.4* HCT 27.2* 26.3* 32.4* PLT 98* 116* 121* Recent Labs 11/26/18 
0979 11/25/18 
4289 11/24/18 
1215 11/24/18 
0034 * 154* 153* 149*  
K 3.1* 3.0* 3.5 2.6*  
* 123* 122* 115* CO2 18* 22 23 29 GLU 98 65 70 93 BUN 28* 30* 31* 34* CREA 1.26 1.24 1.18 1.42* CA 7.8* 7.6* 7.7* 8.7 MG 1.7 1.6  --  1.9 PHOS 2.0* 2.1*  --   --   
ALB  --   --   --  1.7* TBILI  --   --   --  0.4 SGOT  --   --   --  24 ALT  --   --   --  26 Signed: Radha Martinez MD

## 2018-11-26 NOTE — PROGRESS NOTES
Rapid Response Note: 
 
0851: Rapid Response was initiated at 0851 due to low oxygen saturation on LARRY Rinaldi, 81 yo male. Patient supine in bed in high denson's position on NRB upon arrival. Patient at baseline, per RN, does not respond, DNR, family discussing hospice. Upon arrival patient's initial oxygen saturation 78-81%. NRB continued and patient's oxygen saturation steadily increased to 93-94%. BS was 124. Lungs very diminished. Thick, tenacious sputum suctioned upon arrival from back of oropharynx. Decision to have patient remain in room on NRB with close monitoring awaiting family's decision for direction of care. Patient's SBP >100mg, RR low 20s. Will continue to monitor and follow. 1040: Rechecked on patient, o2 sat remains 95%, no concern from nursing staff.

## 2018-11-26 NOTE — PROGRESS NOTES
Spiritual Care Assessment/Progress Note [address] 
 
 
NAME: Joseph Dewey      MRN: [id number] AGE: 82 y.o. SEX: male Caodaism Affiliation: Unknown Language: English  
 
11/26/2018     Total Time (in minutes): 8 Spiritual Assessment begun in [facility] GENERAL SURGERY through conversation with: 
  
    [x]Patient        [] Family    [] Friend(s) Reason for Consult: Rapid response team  
 
Spiritual beliefs: (Please include comment if needed) 
   [] Identifies with a claudia tradition:     
   [] Supported by a claudia community:        
   [x] Claims no spiritual orientation:       
   [] Seeking spiritual identity:            
   [] Adheres to an individual form of spirituality:       
   [] Not able to assess:                   
 
    
Identified resources for coping:  
   [] Prayer                           
   [] Music                  [] Guided Imagery 
   [] Family/friends                 [] Pet visits [] Devotional reading                         [x] Unknown 
   [] Other:                                       
 
 
Interventions offered during this visit: (See comments for more details) Patient Interventions: Initial/Spiritual assessment, patient floor Plan of Care: 
 
 [x] Support spiritual and/or cultural needs  
 [] Support AMD and/or advance care planning process    
 [] Support grieving process 
 [] Coordinate Rites and/or Rituals  
 [] Coordination with community clergy [] No spiritual needs identified at this time 
 [] Detailed Plan of Care below (See Comments)  [] Make referral to Music Therapy 
[] Make referral to Pet Therapy    
[] Make referral to Addiction services 
[] Make referral to [facility] 
[] Make referral to Spiritual Care Partner 
[] No future visits requested       
[] Follow up visits as needed Comments:  responded to RRT. Staff providing care to patient.   No family present. Will continue to follow up as needed and upon request as able. Visited by Rev. Nikita Armstrong, [facility]  paging service: [phone])

## 2018-11-26 NOTE — PROGRESS NOTES
1910 Bedside and Verbal shift change report given to REBECCA Manley  (oncoming nurse) by Jerry Mccann RN  (offgoing nurse). Report included the following information SBAR, Kardex and Cardiac Rhythm NSR.  
2000- Shift assessment performed. Patient responds to stimulus. Does not open eyes at this time. R  > L . Palpable pulses. D5 1/2 NS infusing at 125 ml/hr. Mendoza intact and draining yellow/straw urine. 2200- Repositioned. 2355- removal of left IJ. Transparent dressing applied. 3142- Report called to Surgical Telemetry.

## 2018-11-26 NOTE — CONSULTS
Palliative Medicine Consult Ethan: [phone]) Patient Name: Ifrah Plunkett YOB: 1935 Date of Initial Consult: 11/26/18 Reason for Consult: care decisions Requesting Provider: Dr. Verline Sandifer Primary Care Physician: Armida, MD Abbey 
 
 SUMMARY:  
Ifrah Plunkett is a 82 y.o. with a past history of Dementia, CVA, HTN, depression, COPD, recent admission for UTI (10/18-10/25/18), who was admitted on 11/24/2018 from [address] [place] [address] with a diagnosis of AMS, acute respiratory failure. Current medical issues leading to Palliative Medicine involvement include: recurrent urosepsis, debility from dementia, hx CVA, COPD Patient had rapid response this morning 11/26 due to low sats, recovered with suction of secretions/ NRB mask Patient completed AMD in past appointing Javier Click and 2mpoa Doris Tovarign POST form completed by his Javier Click during last admission (goals clear for limited medical interventions - DNR, No intubation, no PEG tube). PALLIATIVE DIAGNOSES:  
1. Urosepsis 2. Acute respiratory failure, COPD 3. Hypernatremia 4. AMS, metabolic encephalopathy 5. Acute renal failure 6. Hypoalbuminemia 7. debility PLAN:  
1. Message left for Kaylynn Cantu on her cell phone [phone]. Will plan to check in, provide medical update, discuss care goals. 2. Goals clarified during last admission 1. No intubation 2. DNR 
3. No feeding tube 3. Initial consult note routed to primary continuity provider 4. Communicated plan of care with: Palliative IDT Addendum:  Discussed medical update with Kaylynn Saucedo. We talked about UTI/sepsis/ with same issues one month ago, difficult to bounce back. Discussed events of this morning (secretions/low sat/ now on face mask). Discussed high risk for further decline, not recovering. Hoping he can improve with antibiotics, will see how he does.  If not improving, will need to readdress care goals and consider comfort focus. Kostas Cabello tells me that her mom (patient's wife) will be here tomorrow with her sister Afsaneh Ng -- I offered to meet with them to provide medical update, they have my number to call if here during daytime. GOALS OF CARE / TREATMENT PREFERENCES:  
 
GOALS OF CARE: 
Patient/Health Care Proxy Stated Goals: Other (comment)(not yet discussed) TREATMENT PREFERENCES:  
Code Status: DNR Advance Care Planning: 
[x] [facility] Interdisciplinary Team has updated the ACP Navigator with [address] and Patient Capacity Primary Decision Maker (Health Care Agent): Devoria Life Relationship to patient:stepdtr Phone number:[phone][x] Named in a scanned document  
[] Legal Next of Kin 
[] Guardian Secondary Decision Maker (First Alternate Health Care Agent):  "[employer] Relationship to patient:stepdtr Phone number: 
[x] Named in a scanned document  
[] Legal Next of Kin 
[] Guardian Medical Interventions: Limited additional interventions Other Instructions: Artificially Administered Nutrition: No feeding tube Other: As far as possible, the palliative care team has discussed with patient / health care proxy about goals of care / treatment preferences for patient. HISTORY:  
 
History obtained from: chart CHIEF COMPLAINT:admitted with AMS 
 
HPI/SUBJECTIVE: The patient is:  
[] Verbal and participatory [x] Non-participatory due to:  
 
82year old male admitted from NH with report he is less responsive, change in mental status He is unable to provide history Clinical Pain Assessment (nonverbal scale for severity on nonverbal patients):  
Clinical Pain Assessment Severity: 0 Activity (Movement): Lying quietly, normal position Duration: for how long has pt been experiencing pain (e.g., 2 days, 1 month, years) Frequency: how often pain is an issue (e.g., several times per day, once every few days, constant) FUNCTIONAL ASSESSMENT:  
 
Palliative Performance Scale (PPS): PPS: 20 
 
 
 PSYCHOSOCIAL/SPIRITUAL SCREENING:  
 
Palliative IDT has assessed this patient for cultural preferences / practices and a referral made as appropriate to needs (Cultural Services, Patient Advocacy, Ethics, etc.) Any spiritual / Cheondoism concerns: 
[] Yes /  [x] No 
 
Caregiver Burnout: 
[] Yes /  [] No /  [x] No Caregiver Present Anticipatory grief assessment:  
[x] Normal  / [] Maladaptive ESAS Anxiety:   unable to assess ESAS Depression:   unable to assess due to delirium REVIEW OF SYSTEMS:  
 
Positive and pertinent negative findings in ROS are noted above in HPI. The following systems were [] reviewed / [x] unable to be reviewed as noted in HPI Other findings are noted below. Systems: constitutional, ears/nose/mouth/throat, respiratory, gastrointestinal, genitourinary, musculoskeletal, integumentary, neurologic, psychiatric, endocrine. Positive findings noted below. Modified ESAS Completed by: provider Pain: 0 Dyspnea: 0 PHYSICAL EXAM:  
 
From RN flowsheet: 
Wt Readings from Last 3 Encounters:  
11/25/18 64.9 kg (143 lb 1.3 oz) 10/25/18 65.3 kg (143 lb 15.4 oz) Blood pressure 139/76, pulse 73, temperature 97.5 °F (36.4 °C), resp. rate 22, weight 64.9 kg (143 lb 1.3 oz), SpO2 100 %. Pain Scale 1: Visual 
Pain Intensity 1: 0 Last bowel movement, if known:  
 
Constitutional: pt has eyes closed, does not open to voice, light touch Face mask in place Nonverbal, no following commands Appears chronically ill Contractures Left hand Not following commands HISTORY:  
 
Active Problems: 
  Altered mental status (11/24/2018) Past Medical History:  
Diagnosis Date  Cancer ([facility] [address]  Chronic obstructive pulmonary disease ([facility] [address] [facility]  Dementia  Hypertension History reviewed. No pertinent surgical history. History reviewed. No pertinent family history. History reviewed, no pertinent family history. Social History Tobacco Use  Smoking status: Former Smoker  Smokeless tobacco: Never Used Substance Use Topics  Alcohol use: No  
  Frequency: Never No Known Allergies Current Facility-Administered Medications Medication Dose Route Frequency  dextrose 5% - 0.45% NaCl with KCl 20 mEq/L infusion  100 mL/hr IntraVENous CONTINUOUS  
 sodium chloride (NS) flush 5-10 mL  5-10 mL IntraVENous Q8H  
 sodium chloride (NS) flush 5-10 mL  5-10 mL IntraVENous PRN  
 heparin (porcine) injection 5,000 Units  5,000 Units SubCUTAneous Q8H  
 albuterol-ipratropium (DUO-NEB) 2.5 MG-0.5 MG/3 ML  3 mL Nebulization Q6H PRN  
 vancomycin (VANCOCIN) 750 mg in 0.9% sodium chloride 250 mL ([id number])  750 mg IntraVENous Q16H  
 cefepime (MAXIPIME) 2 g in 0.9% sodium chloride (MBP/ADV) 100 mL  2 g IntraVENous Q12H  mupirocin (BACTROBAN) 2 % ointment   Both Nostrils BID  
 
 
 
 LAB AND IMAGING FINDINGS:  
 
Lab Results Component Value Date/Time WBC 9.6 11/26/2018 03:46 AM  
 HGB 9.3 (L) 11/26/2018 03:46 AM  
 PLATELET 98 (L) 43/74/5613 03:46 AM  
 
Lab Results Component Value Date/Time Sodium 152 (H) 11/26/2018 03:46 AM  
 Potassium 3.1 (L) 11/26/2018 03:46 AM  
 Chloride 124 (H) 11/26/2018 03:46 AM  
 CO2 18 (L) 11/26/2018 03:46 AM  
 BUN 28 (H) 11/26/2018 03:46 AM  
 Creatinine 1.26 11/26/2018 03:46 AM  
 Calcium 7.8 (L) 11/26/2018 03:46 AM  
 Magnesium 1.7 11/26/2018 03:46 AM  
 Phosphorus 2.0 (L) 11/26/2018 03:46 AM  
  
Lab Results Component Value Date/Time AST (SGOT) 24 11/24/2018 12:34 AM  
 Alk. phosphatase 93 11/24/2018 12:34 AM  
 Protein, total 6.6 11/24/2018 12:34 AM  
 Albumin 1.7 (L) 11/24/2018 12:34 AM  
 Globulin 4.9 (H) 11/24/2018 12:34 AM  
 
No results found for: INR, PTMR, PTP, PT1, PT2, APTT No results found for: IRON, FE, TIBC, IBCT, PSAT, FERR No results found for: PH, PCO2, PO2 No components found for: [place] No results found for: CPK, CKMB Total time: 60min Counseling / coordination time, spent as noted above: 35 
> 50% counseling / coordination?: yes Prolonged service was provided for  []30 min   []75 min in face to face time in the presence of the patient, spent as noted above. Time Start:  
Time End:  
Note: this can only be billed with 61758 (initial) or 93546 (follow up). If multiple start / stop times, list each separately.

## 2018-11-26 NOTE — PROGRESS NOTES
Speech path Nsg assessed sats and they were in the 60-70s. We will hold eval until his respiratory status is stable.   
Yuli Hernandez, SLP

## 2018-11-27 NOTE — PROGRESS NOTES
CM contacted pt's daughter: Salome Sandhu, via telephone in regards to pt's d/c needs and plans. Mathieu Leone reported that she wasn't able to discuss pt's plans with CM, and that she could contact CM back on this afternoon. CM will continue to follow up with pt and make referrals as deemed necessary. JAMES Roblero  
[phone]

## 2018-11-27 NOTE — PROGRESS NOTES
General Surgery End of Shift Nursing Note Bedside shift change report given to Mercy Harrison (oncoming nurse) by Hazel Khoury (offgoing nurse). Report included the following information SBAR, Kardex, Intake/Output, MAR and Recent Results. Shift worked:   C Summary of shift:    0 Issues for physician to address:   0 Number times ambulated in hallway past shift: 0 Number of times OOB to chair past shift: 0 Pain Management: 
Current medication: 0 Patient states pain is manageable on current pain medication: YES 
 
GI: 
 
Current diet:  DIET NPO Tolerating current diet: YES Passing flatus: NO 
Last Bowel Movement: several days ago Appearance: 0 Respiratory: 
 
Incentive Spirometer at bedside: NO 
Patient instructed on use: NO 
 
Patient Safety: 
 
Falls Score: 1 Bed Alarm On? Yes Sitter?  No 
 
Brent Mercer RN

## 2018-11-27 NOTE — PROGRESS NOTES
21:11 - Pt's O2 sat 69% on 9LPM via High Flow NC. O2 increased to 12LPM and RT called. 21:14 - O2 sat up to 76% on 12LPM via High Flow NC. RT in room at bedside. Pt only responds to pain not to voice. 21:16 - Pt put on non-rebreather mask. 21:20 - Pt turned and repositioned onto right side. Pt tolerated fair. O2 sat up to 92%. 21:28 - O2 sat now up to 94-97% on non-rebreather mask. Pt resting quietly and appears comfortable. No s/sx of acute discomfort noted at this time.

## 2018-11-27 NOTE — PROGRESS NOTES
Initial Nutrition Assessment: 
 
INTERVENTIONS/RECOMMENDATIONS:  
· Meals/Snacks: General/healthful diet: RD will follow with further intervention pending goals of care. ASSESSMENT:  
11/27:  Chart reviewed; med noted for AMS, h/o dementia and dysphagia. Pt remains NPO. Palliative care involvement to determine goals of care. On previous admission = DNR, no PEG, no intubation. Noted no PEG in one note and no TF in another, unable to determine if pt would not want a temporary NGT. If weight records accurate, appears to have experienced weight loss (10/18/18 = 178 lbs; current weight documented 143 lbs). Diet Order: NPO 
% Eaten:  No data found. Pertinent Medications: [x]Reviewed []Other Pertinent Labs: [x]Reviewed []Other: Na+ 152, K+ 3.4, Phos 2.0, Creat 1.23 Food Allergies: [x]None []Other Last BM:    [x]Active     []Hyperactive  []Hypoactive       [] Absent BS Skin:    [] Intact   [] Incision  [] Breakdown  [] Other: Anthropometrics:  
Height: 6' (182.9 cm) Weight: 64.9 kg (143 lb 1.3 oz) IBW (%IBW):   ( ) UBW (%UBW):   (  %) Last Weight Metrics: 
Weight Loss Metrics 11/25/2018 10/25/2018 Today's Wt 143 lb 1.3 oz 143 lb 15.4 oz BMI 19.4 kg/m2 19.52 kg/m2 BMI: Body mass index is 19.4 kg/m². This BMI is indicative of: 
 []Underweight    [x]Normal    []Overweight    [] Obesity   [] Extreme Obesity (BMI>40) Estimated Nutrition Needs (Based on):  
1809 Kcals/day(BMR (1392) x 1. 3AF) , 78 g(1.2 g/kg bw) Protein Carbohydrate: At Least 130 g/day  Fluids: 1800 mL/day (1ml/kcal) NUTRITION DIAGNOSES:  
Problem:  Altered nutrition-related lab values Etiology: related to current medical condition Signs/Symptoms: as evidenced by elevated Na+, low K+, low Phos, elevated BUN/Creat NUTRITION INTERVENTIONS: 
Meals/Snacks: General/healthful diet GOAL:  
Pending goals of care next 2-4 days LEARNING NEEDS (Diet, Food/Nutrient-Drug Interaction):  
 [x] None Identified 
 [] Identified and Education Provided/Documented 
 [] Identified and Pt declined/was not appropriate Cultureal, Druze, OR Ethnic Dietary Needs:  
 [x] None Identified 
 [] Identified and Addressed 
 
 [x] Interdisciplinary Care Plan Reviewed/Documented  
 [x] Discharge Planning:  Pending goals of care; per documentation no PEG tube MONITORING /EVALUATION:  
Food/Nutrient Intake Outcomes: Total energy intake Physical Signs/Symptoms Outcomes: Weight/weight change, Electrolyte and renal profile NUTRITION RISK:  
 [x] Patient At Nutritional Risk  
 [] Patient Not At Nutritional Risk PT SEEN FOR:  
 []  MD Consult: []Calorie Count []Diabetic Diet Education []Diet Education []Electrolyte Management []General Nutrition Management and Supplements []Management of Tube Feeding []TPN Recommendations [x]  RN Referral:  [x]MST score >=2 
   []Enteral/Parenteral Nutrition PTA []Pregnant: Gestational DM or Multigestation 
   []Pressure Ulcer/Wound Care needs 
     
[]  Low BMI 
[]  GIULIA Wood RD Pager [phone] Weekend Pager [phone]

## 2018-11-27 NOTE — CONSULTS
Palliative Medicine Consult [place]: [phone] ([phone]) Patient Name: Juan Taylor YOB: 1935 Date of Initial Consult: 11/26/18 Reason for Consult: care decisions Requesting Provider: Dr. Tian Toussaint Primary Care Physician: Armida, MD [place] 
 
 SUMMARY:  
Juan Taylor is a 82 y.o. with a past history of Dementia, CVA, HTN, depression, COPD, recent admission for UTI (10/18-10/25/18), who was admitted on 11/24/2018 from [address] [place] [address] with a diagnosis of AMS, acute respiratory failure. Current medical issues leading to Palliative Medicine involvement include: recurrent urosepsis, debility from dementia, hx CVA, COPD Patient had rapid response this morning 11/26 due to low sats, recovered with suction of secretions/ NRB mask Patient completed AMD in past appointing Afia Shipley and 2mpoa Randolph Nielsen POST form completed by his Afia Shipley during last admission (goals clear for limited medical interventions - DNR, No intubation, no PEG tube). PALLIATIVE DIAGNOSES:  
1. Urosepsis 2. Acute respiratory failure, COPD 3. Hypernatremia 4. AMS, metabolic encephalopathy 5. Acute renal failure 6. Hypoalbuminemia 7. Debility 8. Prognosis for recovery to baseline, poor, high risk of dying. PLAN:  
 
1. Goals clarified during last admission 1. No intubation 2. DNR 
3. No feeding tube [address] ([phone]) (see yesterday's note also) Update provided, no improvement in mental status. I am concerned he will not recovery, we talked about back to back infections and how he is at risk for another, even if he does recover from this one. Explained current treatments (abx for infection, correcting sodium, potassium). No right or wrong answer, but important for family to talk now about how best to care for him, one option keep doing these medical things and try for some improvement, or shift focus to comfort.     Know that even if he pulls through this time, that he is overall declining and will have another infection and hospitalization soon. Phong Anderson is very worried about her mom -- in fragile health and not well able to cope with her 's decline, care decisions could take quite a bit of long talking. Phong Anderson appreciative of update, information. We made plan to meet tomorrow hopefully, if family can all get here. Phong Anderson can drive from the [employer] [facility] [place] after work and her sister Sabine Burnette can bring their mother, patient's wife. Phong Anderson will call our team number tomorrow to confirm if 4pm Wednesday will work for family meeting GOALS OF CARE / TREATMENT PREFERENCES:  
 
GOALS OF CARE: 
Patient/Health Care Proxy Stated Goals: Other (comment)(not yet discussed) TREATMENT PREFERENCES:  
Code Status: DNR Advance Care Planning: 
[x] [facility] Interdisciplinary Team has updated the ACP Navigator with [address] and Patient Capacity Primary Decision Maker (Health Care Agent): Stan Horowitz Relationship to patient:stepdtr Phone number:[phone][x] Named in a scanned document  
[] Legal Next of Kin 
[] Guardian Secondary Decision Maker (First Alternate Health Care Agent):  Hayley Moreno Relationship to patient:stepdtr Phone number: 
[x] Named in a scanned document  
[] Legal Next of Kin 
[] Guardian Medical Interventions: Limited additional interventions Other Instructions: Artificially Administered Nutrition: No feeding tube Other: As far as possible, the palliative care team has discussed with patient / health care proxy about goals of care / treatment preferences for patient. HISTORY:  
 
History obtained from: chart CHIEF COMPLAINT:admitted with AMS 
 
HPI/SUBJECTIVE: The patient is:  
[] Verbal and participatory [x] Non-participatory due to:  
 
82year old male admitted from NH with report he is less responsive, change in mental status He is unable to provide history Clinical Pain Assessment (nonverbal scale for severity on nonverbal patients):  
Clinical Pain Assessment Severity: 0 Activity (Movement): Lying quietly, normal position Duration: for how long has pt been experiencing pain (e.g., 2 days, 1 month, years) Frequency: how often pain is an issue (e.g., several times per day, once every few days, constant) FUNCTIONAL ASSESSMENT:  
 
Palliative Performance Scale (PPS): PPS: 20 
 
 
 PSYCHOSOCIAL/SPIRITUAL SCREENING:  
 
Palliative IDT has assessed this patient for cultural preferences / practices and a referral made as appropriate to needs (Cultural Services, Patient Advocacy, Ethics, etc.) Any spiritual / Spiritism concerns: 
[] Yes /  [x] No 
 
Caregiver Burnout: 
[] Yes /  [] No /  [x] No Caregiver Present Anticipatory grief assessment:  
[x] Normal  / [] Maladaptive ESAS Anxiety:   unable to assess ESAS Depression:   unable to assess due to delirium REVIEW OF SYSTEMS:  
 
Positive and pertinent negative findings in ROS are noted above in HPI. The following systems were [] reviewed / [x] unable to be reviewed as noted in HPI Other findings are noted below. Systems: constitutional, ears/nose/mouth/throat, respiratory, gastrointestinal, genitourinary, musculoskeletal, integumentary, neurologic, psychiatric, endocrine. Positive findings noted below. Modified ESAS Completed by: provider Pain: 0 Dyspnea: 0 PHYSICAL EXAM:  
 
From RN flowsheet: 
Wt Readings from Last 3 Encounters:  
11/25/18 64.9 kg (143 lb 1.3 oz) 10/25/18 65.3 kg (143 lb 15.4 oz) Blood pressure 133/87, pulse 78, temperature 97.6 °F (36.4 °C), resp. rate 18, height 6' (1.829 m), weight 64.9 kg (143 lb 1.3 oz), SpO2 100 %. Pain Scale 1: Visual 
Pain Intensity 1: 0 Last bowel movement, if known:  
 
Constitutional: pt has eyes closed, does not open to voice, light touch Face mask in place Nonverbal, no following commands Appears chronically ill Contractures Left hand Not following commands HISTORY:  
 
Active Problems: 
  Altered mental status (11/24/2018) Past Medical History:  
Diagnosis Date  Cancer ([facility] [address]  Chronic obstructive pulmonary disease ([facility]  Dementia  Hypertension History reviewed. No pertinent surgical history. History reviewed. No pertinent family history. History reviewed, no pertinent family history. Social History Tobacco Use  Smoking status: Former Smoker  Smokeless tobacco: Never Used Substance Use Topics  Alcohol use: No  
  Frequency: Never No Known Allergies Current Facility-Administered Medications Medication Dose Route Frequency  dextrose 5% with KCl 20 mEq/L infusion   IntraVENous CONTINUOUS  
 sodium chloride (NS) flush 5-10 mL  5-10 mL IntraVENous Q8H  
 sodium chloride (NS) flush 5-10 mL  5-10 mL IntraVENous PRN  
 heparin (porcine) injection 5,000 Units  5,000 Units SubCUTAneous Q8H  
 albuterol-ipratropium (DUO-NEB) 2.5 MG-0.5 MG/3 ML  3 mL Nebulization Q6H PRN  
 vancomycin (VANCOCIN) 750 mg in 0.9% sodium chloride 250 mL ([id number])  750 mg IntraVENous Q16H  
 cefepime (MAXIPIME) 2 g in 0.9% sodium chloride (MBP/ADV) 100 mL  2 g IntraVENous Q12H  mupirocin (BACTROBAN) 2 % ointment   Both Nostrils BID  
 
 
 
 LAB AND IMAGING FINDINGS:  
 
Lab Results Component Value Date/Time WBC 9.6 11/26/2018 03:46 AM  
 HGB 9.3 (L) 11/26/2018 03:46 AM  
 PLATELET 98 (L) 03/93/9169 03:46 AM  
 
Lab Results Component Value Date/Time Sodium 152 (H) 11/27/2018 05:27 AM  
 Potassium 3.4 (L) 11/27/2018 05:27 AM  
 Chloride 126 (H) 11/27/2018 05:27 AM  
 CO2 19 (L) 11/27/2018 05:27 AM  
 BUN 25 (H) 11/27/2018 05:27 AM  
 Creatinine 1.23 11/27/2018 05:27 AM  
 Calcium 7.3 (L) 11/27/2018 05:27 AM  
 Magnesium 1.7 11/26/2018 03:46 AM  
 Phosphorus 2.0 (L) 11/26/2018 03:46 AM  
  
Lab Results Component Value Date/Time AST (SGOT) 24 11/24/2018 12:34 AM  
 Alk. phosphatase 93 11/24/2018 12:34 AM  
 Protein, total 6.6 11/24/2018 12:34 AM  
 Albumin 1.7 (L) 11/24/2018 12:34 AM  
 Globulin 4.9 (H) 11/24/2018 12:34 AM  
 
No results found for: INR, PTMR, PTP, PT1, PT2, APTT No results found for: IRON, FE, TIBC, IBCT, PSAT, FERR No results found for: PH, PCO2, PO2 No components found for: [place] No results found for: CPK, CKMB Total time: 25 
Counseling / coordination time, spent as noted above: 25 
> 50% counseling / coordination?: yes Prolonged service was provided for  []30 min   []75 min in face to face time in the presence of the patient, spent as noted above. Time Start:  
Time End:  
Note: this can only be billed with 71808 (initial) or 42869 (follow up). If multiple start / stop times, list each separately.

## 2018-11-27 NOTE — PROGRESS NOTES
Rapid Response Rounding Note: 
 
Rapid response RN rounded on patient this morning, reviewed chart/over night events, spoke with RN and CCL. Patient placed back on NRB over night due to desat. O2 sat on %, VSS. Awaiting family's decision regarding care/treatment goals. Will continue to monitor/follow.

## 2018-11-27 NOTE — PROGRESS NOTES
Hospitalist Progress Note NAME: Hailey Lopez :  1935 MRN:  [id number] Assessment / Plan: 
Septic shock 2/2 UTI resolved AMS most likely toxic metabolic encephalopathy in setting sepsis POA Severe sepsis 2/2  Complicated UTI in setting of indwelling mike POA Acute respiratory failure with hypoxia poa  
hypothermia ELISABETH most likely prerenal  
Hypernatremia Hypokalemia Hypophosphatemia  
- Resp failure on 15 L NRB , Recheck CXR portable ordered - Hyponatremia Na 152, switch from dextrose 1/2ns + K to Dextrose infusion + K 20 meq - Daily bmp to monitor Na & K  
- Hypokalemia K 134 
- blood cx negative  
- continue cefepime and Vancomycin - Vitals wnl today - Palliative following and are planning to meet with family. Meeting schedule for 4pm tomorrow with Phong HE.  
- rapid response today because of hypoxia 2/2 lot of secretions , sat came up with NRB mask  
- DG to Tele since yesterday  
- started levophed through central line 0n  
- off Pressors on  
-CT scan of the brain was negative, keep NPO while lethargic, speech eval   
- last urine culture in 2018 grew Klebsiella oxytoca and morganella Morgani sensitive to ceftriaxone. - new urine culture growing pseudomonas and 2nd one - Abx switched to cefepime and vanco  
Continue with as needed IV fluid boluses if systolic blood pressure less than 90 Continue with D5 half-normal saline with K,  keep n.p.o. while lethargic 
-Daily BMP 
 blood cultures negative Mike changed in the ED 
- We will get urine electrolytes,  
renal ultrasound showed : 
1. No hydronephrosis or other sonographic abnormality of the kidneys. 2. Infrarenal abdominal aortic aneurysm measuring 4.6 x 4.3 cm 
  
- K repleted and phos Hypertension 
hld Hypothyroidism Hold all BP meds Hold atorvastatin Restart levothyroxine once able to tolerate p.o. 
  
H/o cva Dementia Depression Hold sertraline  
  
Copd Stable , c/w prn duonebs No home inhalers  
  
 
DVT prophylaxis Heparin CODE STATUS  
DNR , called Nok on chart Myranda Patterson at [phone] ,[facility] called back , Pt is DNR Pt is critically ill , will get palliative involved Disposition TBD 
  
Code Status: DNR ( ACP on chart) DVT Prophylaxis: Hep SQ 
GI Prophylaxis: not indicated Body mass index is 19.4 kg/m². therefore classifying patient as normal weight Code status: DNR Prophylaxis: Hep SQ Recommended Disposition: SNF/LTC Subjective: Chief Complaint / Reason for Physician Visit \"FU severe sepsis\". Discussed with RN events overnight. Review of Systems: 
Symptom Y/N Comments  Symptom Y/N Comments Fever/Chills    Chest Pain Poor Appetite    Edema Cough    Abdominal Pain Sputum    Joint Pain SOB/WEBSTER    Pruritis/Rash Nausea/vomit    Tolerating PT/OT Diarrhea    Tolerating Diet Constipation    Other Could NOT obtain due to: Lethargy Objective: VITALS:  
Last 24hrs VS reviewed since prior progress note. Most recent are: 
Patient Vitals for the past 24 hrs: 
 Temp Pulse Resp BP SpO2  
11/27/18 0808 97.1 °F (36.2 °C) 83 22 141/80 100 % 11/27/18 0344 97.8 °F (36.6 °C) 83 23 (!) 144/92 100 % 11/26/18 2345 97.6 °F (36.4 °C) 79 24 123/72 100 % 11/26/18 2130     100 % 11/26/18 2119 97.5 °F (36.4 °C) 89 24 118/72 (!) 70 % 11/26/18 1806  75   97 % 11/26/18 1611 97.6 °F (36.4 °C) 72 24 116/64 99 % 11/26/18 1400     97 % 11/26/18 1216 97.5 °F (36.4 °C) 73 22 139/76 100 % 11/26/18 1204 97.5 °F (36.4 °C) 81 24 114/66 90 % 11/26/18 0957 97.6 °F (36.4 °C) 80 23 112/62 98 % 11/26/18 0913  84 25 114/75 95 % 11/26/18 0907 97.5 °F (36.4 °C) 81 22 98/62 96 % 11/26/18 0901    115/72 91 % 11/26/18 0859     (!) 82 % 11/26/18 0856     (!) 77 % Intake/Output Summary (Last 24 hours) at 11/27/2018 5326 Last data filed at 11/27/2018 0405 Gross per 24 hour Intake 550 ml Output 800 ml Net -250 ml PHYSICAL EXAM: 
General: WD, WN. Unresponsive EENT:  EOMI. Anicteric sclerae. MMM Resp:  CTA bilaterally, no wheezing or rales. No accessory muscle use CV:  Regular  rhythm,  No edema GI:  Soft, Non distended, Non tender.  +Bowel sounds Neurologic:  Alert and oriented X 0 normal speech, Psych:   Unable to assess Skin:  Petechiae on ankles and feet. No jaundice Reviewed most current lab test results and cultures  YES Reviewed most current radiology test results   YES Review and summation of old records today    NO Reviewed patient's current orders and MAR    YES 
PMH/SH reviewed - no change compared to H&P 
________________________________________________________________________ Care Plan discussed with: 
  Comments Patient x Family RN x Care Manager Consultant Multidiciplinary team rounds were held today with , nursing, pharmacist and clinical coordinator. Patient's plan of care was discussed; medications were reviewed and discharge planning was addressed. ________________________________________________________________________ Total NON critical care TIME:  40  Minutes Total CRITICAL CARE TIME Spent:   Minutes non procedure based Comments >50% of visit spent in counseling and coordination of care x As above   
________________________________________________________________________ Bari Schlatter, MD  
 
Procedures: see electronic medical records for all procedures/Xrays and details which were not copied into this note but were reviewed prior to creation of Plan. LABS: 
I reviewed today's most current labs and imaging studies. Pertinent labs include: 
Recent Labs  
  11/26/18 
0346 11/25/18 
7252 WBC 9.6 9.6 HGB 9.3* 8.9* HCT 27.2* 26.3*  
PLT 98* 116* Recent Labs  
  11/27/18 
0527 11/26/18 0346 11/25/18 
5910 * 152* 154* K 3.4* 3.1* 3.0*  
 * 124* 123* CO2 19* 18* 22  
GLU 98 98 65 BUN 25* 28* 30* CREA 1.23 1.26 1.24  
CA 7.3* 7.8* 7.6*  
MG  --  1.7 1.6 PHOS  --  2.0* 2.1* Signed: Woo Sims MD

## 2018-11-28 NOTE — ACP (ADVANCE CARE PLANNING)
Patient is not decisional at this time, his health care agents are noted as Active as a result. Primary Decision Maker (Health Care Agent): Carolina Sukh Relationship to patient: ( Step) Daughter Phone number: [phone] H/ [phone] C / [phone] W [x] Named in a scanned document  
[] Legal Next of Kin 
[] Guardian Secondary Decision Maker (First Alternate Health Care Agent):  Long Parham Relationship to patient: (Step) Daughter Phone number: [phone] [x] Named in a scanned document  
[] Legal Next of Kin 
[] Guardian ACP documents you current have include: 
[x] Advance Directive or Living Will 
[] Durable Do Not Resuscitate [x] Physician Orders for Scope of Treatment (POST) [] Medical Power of  
[] Other

## 2018-11-28 NOTE — PROGRESS NOTES
Hospitalist Progress Note NAME: Jorge Antunez :  1935 MRN:  [id number] Assessment / Plan: 
Septic shock 2/2 UTI resolved AMS most likely toxic metabolic encephalopathy in setting sepsis POA Severe sepsis 2/2  Complicated UTI in setting of indwelling mike POA Acute respiratory failure with hypoxia poa  
hypothermia ELISABETH most likely prerenal - resolved Hypernatremia Hypokalemia - resolved Hypophosphatemia Pneumonia Complicated UTI Hypertension  
-CXR  shows worsening airspace dx R>L with mild pleural effusion - Leukocytosis wbc 13.3 today , trend - Continue cefepime  
- d/c Vancomycin 
- Urine cx + pseudomonas sensitive to cefepime - Hypernatremia - Na trending down 152 -> 150 today continue Dextrose infusion w/ K 20 meq - Hypokalemia resolved K 3.7 today - Palliative following and are planning to meet with family. Meeting schedule for 4pm tomorrow with Dmitriy HE. - Hypertension . Clonidine patch 
- meeting with family today set for 4 pm. Patient probably Hospice appropriate  
- Resp failure on 15 L NRB , Recheck CXR portable ordered - Hyponatremia Na 152 trending down, switch from dextrose 1/2ns + K to Dextrose infusion + K 20 meq  continue  
- Daily bmp to monitor Na & K  
- Hypokalemia K 134 
- blood cx negative  
- continue cefepime and Vancomycin - Vitals wnl today - Palliative following and are planning to meet with family. Meeting schedule for 4pm tomorrow with Dmitriy HE.  
- rapid response today because of hypoxia 2/2 lot of secretions , sat came up with NRB mask  
- DG to Tele since yesterday  
- started levophed through central line 0n  
- off Pressors on  
-CT scan of the brain was negative, keep NPO while lethargic, speech eval   
- last urine culture in 2018 grew Klebsiella oxytoca and morganella Morgani sensitive to ceftriaxone. - new urine culture growing pseudomonas and 2nd one - Abx switched to cefepime and vanco  
Continue with as needed IV fluid boluses if systolic blood pressure less than 90 Continue with D5 half-normal saline with K,  keep n.p.o. while lethargic 
-Daily BMP 
 blood cultures negative Mendoza changed in the ED 
- We will get urine electrolytes,  
renal ultrasound showed : 
1. No hydronephrosis or other sonographic abnormality of the kidneys. 2. Infrarenal abdominal aortic aneurysm measuring 4.6 x 4.3 cm 
  
- K repleted and phos Hypertension 
hld Hypothyroidism Hold all BP meds Hold atorvastatin Restart levothyroxine once able to tolerate p.o. 
  
H/o cva Dementia Depression Hold sertraline  
  
Copd Stable , c/w prn duonebs No home inhalers  
  
 
DVT prophylaxis Heparin CODE STATUS  
DNR , called Nok on chart Terri Jade at [phone] ,[facility] called back , Pt is DNR Pt is critically ill , will get palliative involved Disposition TBD 
  
Code Status: DNR ( ACP on chart) DVT Prophylaxis: Hep SQ 
GI Prophylaxis: not indicated Body mass index is 19.4 kg/m². therefore classifying patient as normal weight Code status: DNR Prophylaxis: Hep SQ Recommended Disposition: SNF/LTC Subjective: Chief Complaint / Reason for Physician Visit \"FU severe sepsis\". Discussed with RN events overnight. Review of Systems: 
Symptom Y/N Comments  Symptom Y/N Comments Fever/Chills    Chest Pain Poor Appetite    Edema Cough    Abdominal Pain Sputum    Joint Pain SOB/WEBSTER    Pruritis/Rash Nausea/vomit    Tolerating PT/OT Diarrhea    Tolerating Diet Constipation    Other Could NOT obtain due to: Lethargy Objective: VITALS:  
Last 24hrs VS reviewed since prior progress note. Most recent are: 
Patient Vitals for the past 24 hrs: 
 Temp Pulse Resp BP SpO2  
11/28/18 0822 95.9 °F (35.5 °C) 78 20 (!) 159/96 100 % 11/28/18 0357 98 °F (36.7 °C) 84 20 156/90 100 % 11/28/18 0040 97.4 °F (36.3 °C) 94 18 (!) 154/99 100 % 11/27/18 2046 98.6 °F (37 °C) 67 20 108/71 100 % 11/27/18 1256 97.6 °F (36.4 °C) 78 18 133/87 100 % 11/27/18 1125     100 % Intake/Output Summary (Last 24 hours) at 11/28/2018 0830 Last data filed at 11/28/2018 2870 Gross per 24 hour Intake 2630 ml Output 1100 ml Net 1530 ml PHYSICAL EXAM: 
General: WD, WN. Unresponsive EENT:  EOMI. Anicteric sclerae. MMM Resp:  CTA bilaterally, no wheezing or rales. No accessory muscle use CV:  Regular  rhythm,  No edema GI:  Soft, Non distended, Non tender.  +Bowel sounds Neurologic:  Alert and oriented X 0 normal speech, Psych:   Unable to assess Skin:  Petechiae on ankles and feet. No jaundice Reviewed most current lab test results and cultures  YES Reviewed most current radiology test results   YES Review and summation of old records today    NO Reviewed patient's current orders and MAR    YES 
PMH/ reviewed - no change compared to H&P 
________________________________________________________________________ Care Plan discussed with: 
  Comments Patient x Family RN x Care Manager Consultant Multidiciplinary team rounds were held today with , nursing, pharmacist and clinical coordinator. Patient's plan of care was discussed; medications were reviewed and discharge planning was addressed. ________________________________________________________________________ Total NON critical care TIME:  40  Minutes Total CRITICAL CARE TIME Spent:   Minutes non procedure based Comments >50% of visit spent in counseling and coordination of care x As above   
________________________________________________________________________ Graciela Hong MD  
 
Procedures: see electronic medical records for all procedures/Xrays and details which were not copied into this note but were reviewed prior to creation of Plan. LABS: 
I reviewed today's most current labs and imaging studies. Pertinent labs include: 
Recent Labs  
  11/28/18 0415 11/26/18 0346 WBC 13.3* 9.6 HGB 10.4* 9.3* HCT 31.6* 27.2*  
PLT 81* 98* Recent Labs  
  11/28/18 0415 11/27/18 0527 11/26/18 0346 * 152* 152* K 3.7 3.4* 3.1*  
* 126* 124* CO2 18* 19* 18* GLU 97 98 98 BUN 24* 25* 28* CREA 1.21 1.23 1.26  
CA 7.7* 7.3* 7.8*  
MG  --   --  1.7 PHOS  --   --  2.0* Signed: Woo Sims MD

## 2018-11-28 NOTE — PROGRESS NOTES
CM informed that pt's family will have a family meeting on today at 4:00pm.  CM will continue to follow up with pt's family to discuss d/c needs and plans and enter referrals as deemed necessary. Sunday JAMES Earl CM 
[phone]

## 2018-11-28 NOTE — ACP (ADVANCE CARE PLANNING)
[facility]: [phone] ([phone]) [facility]: [phone] ([phone]) Telephone call made to daughter Noah Serrano, message left, to ask if she is still planning to come in with patient's wife at 4 pm today, so Palliative team can meet with her. Left Palliative phone number to call. Also reviewed chart and AMD, updated ACP note and health care agents who are both step daughters. (Please see ACP note). Did not get a call back from daughterDmitriy, no family in room, went by several times. Patient visibly looks hospice appropriate.

## 2018-11-28 NOTE — PROGRESS NOTES
Speech Pathology bedside swallow evaluation/discharge Patient: Sherif Wadsworth [de-identified]82 y.o. male) Date: 11/28/2018 Primary Diagnosis: Altered mental status Precautions:     
 
ASSESSMENT : 
Based on the objective data described below, the patient is not safe for any po administration. He arouses to tactile stimuli inconsistently by opening his eyes but not interacting with his environment. He is not following commands or verbalizing. His mouth is wide open at rest and he is sating at 100% with a NRB mask on. A wet swab was run around in his mouth to stimulate a swallow and to help clean the dried green mucous on his tongue and palate. He is not handling his own secretions well which is an indicator of his poor swallowing ability. Discussed with nsg that he seems to be actively dying. Not really appropriate for po consideration now nor likely to be. Skilled therapy provided by a speech-language pathologist is not indicated at this time. PLAN : 
Recommendations: NPO Seems Hospice appropriate. Unless he changes drastically with re: to his mental status; alert and aware of environment he does not need a reeval.  
Discharge Recommendations: None SUBJECTIVE:  
Patient was not alert enough for po trials and did not interact in any way. OBJECTIVE:  
 
Past Medical History:  
Diagnosis Date  Cancer ([facility] [address]  Chronic obstructive pulmonary disease ([facility] [address]  Dementia  Hypertension History reviewed. No pertinent surgical history. Prior Level of Function/Home Situation:  
Home Situation Home Environment: Private residence One/Two Story Residence: One story Living Alone: No 
Support Systems: Family member(s) Patient Expects to be Discharged to[de-identified] Private residence Current DME Used/Available at Home: None Diet prior to admission: 
Current Diet:  NPO Cognitive and Communication Status: 
Neurologic State: Eyes open to stimulus, Lethargic Orientation Level: Unable to verbalize Cognition: No command following Safety/Judgement: Not assessed Oral Assessment: 
Oral Assessment Labial: No impairment Dentition: Limited; Extractions; Poor Lingual: Other (comment) Velum: Other (comment) Mandible: No impairment P.O. Trials: 
Patient Position: upright in bed Vocal quality prior to P.O.: Other (comment) Consistency Presented: (offered wet swab to his oral cavity to stimulate a swallow but none was stimulated) Oral Phase Severity: Severe Pharyngeal Phase Severity : Other (comment) NOMS:  
The NOMS functional outcome measure was used to quantify this patient's level of swallowing impairment. Based on the NOMS, the patient was determined to be at level 1 for swallow function G Codes: In compliance with CMSs Claims Based Outcome Reporting, the following G-code set was chosen for this patient based the use of the NOMS functional outcome to quantify this patient's level of swallowing impairment. Using the NOMS, the patient was determined to be at level 1 for swallow function which correlates with the CN= 100% level of severity. Based on the objective assessment provided within this note, the current, goal, and discharge g-codes are as follows: 
 
Swallow  Swallowing: 
 Swallow Current Status CN= 100%  Swallow Goal Status CN= 100%  Swallow D/C Status CN= 100% NOMS Swallowing Levels: 
Level 1 (CN): NPO Level 2 (CM): NPO but takes consistency in therapy Level 3 (CL): Takes less than 50% of nutrition p.o. and continues with nonoral feedings; and/or safe with mod cues; and/or max diet restriction Level 4 (CK): Safe swallow but needs mod cues; and/or mod diet restriction; and/or still requires some nonoral feeding/supplements Level 5 (CJ): Safe swallow with min diet restriction; and/or needs min cues Level 6 (CI): Independent with p.o.; rare cues; usually self cues; may need to avoid some foods or needs extra time Level 7 (CH): Independent for all p.o. RUBEN. (2003). National Outcomes Measurement System (NOMS): Adult Speech-Language Pathology User's Guide. Pain: 
Pain Scale 1: Visual 
Pain Intensity 1: 0 After treatment:  
[] Patient left in no apparent distress sitting up in chair 
[x] Patient left in no apparent distress in bed 
[] Call bell left within reach [x] Nursing notified 
[] Caregiver present 
[] Bed alarm activated COMMUNICATION/EDUCATION:  
The patients plan of care including findings, recommendations, and recommended diet changes were discussed with: Registered Nurse. 
 
[] Posted safety precautions in patient's room. [] Patient/family have participated as able and agree with findings and recommendations. [x] Patient is unable to participate in plan of care at this time. Thank you for this referral. 
LINN Amin Time Calculation: 10 mins

## 2018-11-29 NOTE — PROGRESS NOTES
Pt noted to have a 7 beat run of V-tach. Dr. Inés Landrum notified via Telemedicine request. Physician returned call to this nurse and discussed pt's current status. Pt is asymptomatic, a DNR, and unresponsive. New order for Magnesium level with AM labs noted. Will continue to MONITOR the POSTERIOR CAPSULE.

## 2018-11-29 NOTE — PROGRESS NOTES
[employer] Good Help to Those in Need 
[phone] Patient Name: Donald Elena YOB: 1935 Age: 82 y.o. [employer] RN Note:  Hospice consult received, reviewing chart. Will follow up with Unit Nurse and Care Manager to discuss plan of care, patient status and discharge disposition within the hour. Thank you for the opportunity to be of service to this patient. Laveda Boxer, RN [place]

## 2018-11-29 NOTE — PROGRESS NOTES
TRANSFER - IN REPORT: 
 
Verbal report received from Christy(name) on Mayra Ricketts  being received from Harleen(unit) for routine progression of care Report consisted of patients Situation, Background, Assessment and  
Recommendations(SBAR). Information from the following report(s) SBAR was reviewed with the receiving nurse. Opportunity for questions and clarification was provided. Assessment completed upon patients arrival to unit and care assumed.

## 2018-11-29 NOTE — PROGRESS NOTES
[place] [phone] ([phone]) [facility]: [phone] ([phone]) LCSW spoke to daughter/Lewis Giles to discuss goals of care. Karol Valdes shared that she and her family have decided for patient to be transitioned to comfort care and would like hospice to evaluate patient (patient may be inpatient hospice appropriate so [facility] should evaluate). Karol Valdes is okay with patient's antibiotics being discontinued and to just focus on comfort. Zoila Councilman NP will see patient today, place the hospice order and discuss plan of care with Dr. Kathleen Uriarte. LCSW let Ana Lilia WOODS know of above. The best phone number to reach Shon Saucedo is her work # until 2:30 pm  [phone]  W or [phone] C at other times. Karol Valdes is able to meet with hospice this afternoon if needed.

## 2018-11-29 NOTE — PROGRESS NOTES
*CM acknowledged consult* CM sent referral to Hospice, via [facility]. CM will continue to follow up. Cameron Singh MSW CM 
[phone]

## 2018-11-29 NOTE — PROGRESS NOTES
Palliative Medicine Consult [place]: [phone] ([phone]) Patient Name: Ambrose Halsted YOB: 1935 Date of Initial Consult: 11/26/18 Reason for Consult: care decisions Requesting Provider: Dr. Carlos Soto Primary Care Physician: Armida, MD Abbey 
 
 SUMMARY:  
Ambrose Halsted is a 82 y.o. with a past history of Dementia, CVA, HTN, depression, COPD, recent admission for UTI (10/18-10/25/18), who was admitted on 11/24/2018 from [address] [place] [address] with a diagnosis of AMS, acute respiratory failure. Current medical issues leading to Palliative Medicine involvement include: recurrent urosepsis, debility from dementia, hx CVA, COPD Patient had rapid response this morning 11/26 due to low sats, recovered with suction of secretions/ NRB mask Patient completed AMD in past appointing [facility] [place] and [facility] [place] POST form completed by his [place] during last admission (goals clear for limited medical interventions - DNR, No intubation, no PEG tube). PALLIATIVE DIAGNOSES:  
1. Urosepsis 2. Acute respiratory failure, COPD 3. Hypernatremia 4. AMS, metabolic encephalopathy 5. Acute renal failure 6. Hypoalbuminemia 7. debility PLAN:  
1. Per conversation with Palliative SW (see her note dated today), pt's daughter reports family has decided to transition to comfort care. 2.  Order for Hospice evaluation placed. 3.  Comfort orders placed. 4.  Paged Aiden English, waiting for call back. 5.  Discussed order changes with RN. GOALS OF CARE / TREATMENT PREFERENCES:  
 
GOALS OF CARE: 
Patient/Health Care Proxy Stated Goals: Other (comment)(not yet discussed) TREATMENT PREFERENCES:  
Code Status: DNR Advance Care Planning: 
[x] [facility] Interdisciplinary Team has updated the ACP Navigator with [address] and Patient Capacity Primary Decision Maker (Health Care Agent): Derrell Palacios Relationship to patient:stepdtr Phone number:[phone][x] Named in a scanned document  
[] Legal Next of Kin 
[] Guardian Secondary Decision Maker (First Alternate Health Care Agent):  Evelyn Haddad Relationship to patient:william Phone number: 
[x] Named in a scanned document  
[] Legal Next of Kin 
[] Guardian Medical Interventions: Limited additional interventions Other Instructions: Artificially Administered Nutrition: No feeding tube Other: As far as possible, the palliative care team has discussed with patient / health care proxy about goals of care / treatment preferences for patient. HISTORY:  
 
History obtained from: chart CHIEF COMPLAINT:admitted with AMS 
 
HPI/SUBJECTIVE: The patient is:  
[] Verbal and participatory [x] Non-participatory due to:  
 
82year old male admitted from NH with report he is less responsive, change in mental status He is unable to provide history 11/29: non-verbal, does not follow command. Clinical Pain Assessment (nonverbal scale for severity on nonverbal patients):  
Clinical Pain Assessment Severity: 0 Activity (Movement): Lying quietly, normal position Duration: for how long has pt been experiencing pain (e.g., 2 days, 1 month, years) Frequency: how often pain is an issue (e.g., several times per day, once every few days, constant) FUNCTIONAL ASSESSMENT:  
 
Palliative Performance Scale (PPS): PPS: 20 
 
 
 PSYCHOSOCIAL/SPIRITUAL SCREENING:  
 
Palliative IDT has assessed this patient for cultural preferences / practices and a referral made as appropriate to needs (Cultural Services, Patient Advocacy, Ethics, etc.) Any spiritual / Mormonism concerns: 
[] Yes /  [x] No 
 
Caregiver Burnout: 
[] Yes /  [] No /  [x] No Caregiver Present Anticipatory grief assessment:  
[x] Normal  / [] Maladaptive ESAS Anxiety:   unable to assess ESAS Depression:   unable to assess due to delirium  REVIEW OF SYSTEMS:  
 
 Positive and pertinent negative findings in ROS are noted above in HPI. The following systems were [] reviewed / [x] unable to be reviewed as noted in HPI Other findings are noted below. Systems: constitutional, ears/nose/mouth/throat, respiratory, gastrointestinal, genitourinary, musculoskeletal, integumentary, neurologic, psychiatric, endocrine. Positive findings noted below. Modified ESAS Completed by: provider Pain: 0 Dyspnea: 0 Stool Occurrence(s): 1 PHYSICAL EXAM:  
 
From RN flowsheet: 
Wt Readings from Last 3 Encounters:  
11/25/18 143 lb 1.3 oz (64.9 kg) 10/25/18 143 lb 15.4 oz (65.3 kg) Blood pressure 120/81, pulse 85, temperature 97.8 °F (36.6 °C), resp. rate 22, height 6' (1.829 m), weight 143 lb 1.3 oz (64.9 kg), SpO2 100 %. Pain Scale 1: Visual 
Pain Intensity 1: 0 Last bowel movement, if known:  
 
Constitutional: awake, alert Face mask in place Nonverbal, no following commands Appears chronically ill Contractures Left hand Not following commands HISTORY:  
 
Active Problems: 
  Altered mental status (11/24/2018) Past Medical History:  
Diagnosis Date  Cancer ([facility] [address]  Chronic obstructive pulmonary disease ([facility] [address]  Dementia  Hypertension History reviewed. No pertinent surgical history. History reviewed. No pertinent family history. History reviewed, no pertinent family history. Social History Tobacco Use  Smoking status: Former Smoker  Smokeless tobacco: Never Used Substance Use Topics  Alcohol use: No  
  Frequency: Never No Known Allergies Current Facility-Administered Medications Medication Dose Route Frequency  magnesium sulfate 2 g/50 ml IVPB (premix or compounded)  2 g IntraVENous ONCE  cloNIDine (CATAPRES) 0.1 mg/24 hr patch 1 Patch  1 Patch TransDERmal Q7D  
 dextrose 5% with KCl 20 mEq/L infusion   IntraVENous CONTINUOUS  
  sodium chloride (NS) flush 5-10 mL  5-10 mL IntraVENous Q8H  
 sodium chloride (NS) flush 5-10 mL  5-10 mL IntraVENous PRN  
 heparin (porcine) injection 5,000 Units  5,000 Units SubCUTAneous Q8H  
 albuterol-ipratropium (DUO-NEB) 2.5 MG-0.5 MG/3 ML  3 mL Nebulization Q6H PRN  
 cefepime (MAXIPIME) 2 g in 0.9% sodium chloride (MBP/ADV) 100 mL  2 g IntraVENous Q12H  mupirocin (BACTROBAN) 2 % ointment   Both Nostrils BID  
 
 
 
 LAB AND IMAGING FINDINGS:  
 
Lab Results Component Value Date/Time WBC 13.1 (H) 11/29/2018 05:21 AM  
 HGB 10.0 (L) 11/29/2018 05:21 AM  
 PLATELET 64 (L) 08/55/6129 05:21 AM  
 
Lab Results Component Value Date/Time Sodium 147 (H) 11/29/2018 05:21 AM  
 Potassium 3.8 11/29/2018 05:21 AM  
 Chloride 122 (H) 11/29/2018 05:21 AM  
 CO2 17 (L) 11/29/2018 05:21 AM  
 BUN 21 (H) 11/29/2018 05:21 AM  
 Creatinine 1.04 11/29/2018 05:21 AM  
 Calcium 8.2 (L) 11/29/2018 05:21 AM  
 Magnesium 1.6 11/29/2018 05:21 AM  
 Phosphorus 2.0 (L) 11/26/2018 03:46 AM  
  
Lab Results Component Value Date/Time AST (SGOT) 24 11/24/2018 12:34 AM  
 Alk. phosphatase 93 11/24/2018 12:34 AM  
 Protein, total 6.6 11/24/2018 12:34 AM  
 Albumin 1.7 (L) 11/24/2018 12:34 AM  
 Globulin 4.9 (H) 11/24/2018 12:34 AM  
 
No results found for: INR, PTMR, PTP, PT1, PT2, APTT No results found for: IRON, FE, TIBC, IBCT, PSAT, FERR No results found for: PH, PCO2, PO2 No components found for: [place] No results found for: CPK, CKMB Total time: 40 min Counseling / coordination time, spent as noted above: 35 
> 50% counseling / coordination?: yes Prolonged service was provided for  []30 min   []75 min in face to face time in the presence of the patient, spent as noted above. Time Start:  
Time End:  
Note: this can only be billed with 02767 (initial) or 19912 (follow up). If multiple start / stop times, list each separately.

## 2018-11-29 NOTE — CONSULTS
Received consult on this patient earlier this morning. See per palliative note, that patient's family have decided to pursue comfort care and hospice. Will defer consult due to this. Please call if anything changes and you would like us to come see the patient again.    
 
 
Michelle Hathaway, FERNY 
DNP, RN, AGACNP-BC

## 2018-11-29 NOTE — PROGRESS NOTES
Hospitalist Progress Note NAME: Mayra Ricketts :  1935 MRN:  [id number] Assessment / Plan: 
Septic shock 2/2 UTI resolved AMS most likely toxic metabolic encephalopathy in setting sepsis POA Severe sepsis 2/2  Complicated UTI in setting of indwelling mike POA Acute respiratory failure with hypoxia poa  
hypothermia ELISABETH most likely prerenal - resolved Hypernatremia Hypokalemia - resolved Hypophosphatemia Pneumonia Complicated UTI Hypertension Non sustained VTAC  
 
 Patient now on comfort care hospice. Medications have been discontinued and is now on comfort measures Palliative following. Family meeting for yesterday 4 pm did not hold. Family did not come. Continue cefepime Patient had 7 beats of Vtac last night . Check K and Magnesium and correct , Goal K > 4 and Mag > 2. Check TSH ordered Consult Cardiology Wbc trending down 13.1 today Na trending down 147 today , continue Dextrose /K Infusion Clonidine patch for htn  
 
 
 
 
-CXR  shows worsening airspace dx R>L with mild pleural effusion - Leukocytosis wbc 13.3 today , trend - Continue cefepime  
- d/c Vancomycin 
- Urine cx + pseudomonas sensitive to cefepime - Hypernatremia - Na trending down 152 -> 150 today continue Dextrose infusion w/ K 20 meq - Hypokalemia resolved K 3.7 today - Palliative following and are planning to meet with family. Meeting schedule for 4pm tomorrow with Anibal HE. - Hypertension . Clonidine patch 
- meeting with family today set for 4 pm. Patient probably Hospice appropriate  
- Resp failure on 15 L NRB , Recheck CXR portable ordered - Hyponatremia Na 152 trending down, switch from dextrose 1/2ns + K to Dextrose infusion + K 20 meq  continue  
- Daily bmp to monitor Na & K  
- Hypokalemia K 134 
- blood cx negative  
- continue cefepime and Vancomycin - Vitals wnl today - Palliative following and are planning to meet with family. Meeting schedule for 4pm tomorrow with Phong Anderson MPOA. 11/26 
- rapid response today because of hypoxia 2/2 lot of secretions , sat came up with NRB mask  
- DG to Tele since yesterday  
- started levophed through central line 0n 11/24 
- off Pressors on 11/26 
-CT scan of the brain was negative, keep NPO while lethargic, speech eval   
- last urine culture in 2018 grew Klebsiella oxytoca and morganella Morgani sensitive to ceftriaxone. - new urine culture growing pseudomonas and 2nd one - Abx switched to cefepime and vanco  
Continue with as needed IV fluid boluses if systolic blood pressure less than 90 Continue with D5 half-normal saline with K,  keep n.p.o. while lethargic 
-Daily BMP 
 blood cultures negative Mendoza changed in the ED 
- We will get urine electrolytes,  
renal ultrasound showed : 
1. No hydronephrosis or other sonographic abnormality of the kidneys. 2. Infrarenal abdominal aortic aneurysm measuring 4.6 x 4.3 cm 
  
- K repleted and phos Hypertension 
hld Hypothyroidism Hold all BP meds Hold atorvastatin Restart levothyroxine once able to tolerate p.o. 
  
H/o cva Dementia Depression Hold sertraline  
  
Copd Stable , c/w prn duonebs No home inhalers  
  
 
DVT prophylaxis Heparin CODE STATUS  
DNR , called Nok on chart Desireekevyneran Anderson at [phone] ,[facility] called back , Pt is DNR Pt is critically ill , will get palliative involved Disposition TBD 
  
Code Status: DNR ( ACP on chart) DVT Prophylaxis: Hep SQ 
GI Prophylaxis: not indicated Body mass index is 19.4 kg/m². therefore classifying patient as normal weight Code status: DNR Prophylaxis: Hep SQ Recommended Disposition: SNF/LTC Subjective: Chief Complaint / Reason for Physician Visit \"FU severe sepsis\". Discussed with RN events overnight. Review of Systems: 
Symptom Y/N Comments  Symptom Y/N Comments Fever/Chills    Chest Pain Poor Appetite    Edema Cough    Abdominal Pain Sputum    Joint Pain SOB/WEBSTER    Pruritis/Rash Nausea/vomit    Tolerating PT/OT Diarrhea    Tolerating Diet Constipation    Other Could NOT obtain due to: Lethargy Objective: VITALS:  
Last 24hrs VS reviewed since prior progress note. Most recent are: 
Patient Vitals for the past 24 hrs: 
 Temp Pulse Resp BP SpO2  
11/29/18 0422 96 °F (35.6 °C) 88 21 (!) 148/94 100 % 11/29/18 0000 98.3 °F (36.8 °C) 67 18 143/67 98 % 11/28/18 1907 96 °F (35.6 °C) 78 20 (!) 142/95 100 % 11/28/18 1521 95.9 °F (35.5 °C) 81 20 (!) 147/99 100 % 11/28/18 1142 95.9 °F (35.5 °C) 79 20 (!) 151/97 100 % Intake/Output Summary (Last 24 hours) at 11/29/2018 5056 Last data filed at 11/29/2018 8243 Gross per 24 hour Intake 2451.66 ml Output 1025 ml Net 1426.66 ml PHYSICAL EXAM: 
General: WD, WN. Unresponsive EENT:  EOMI. Anicteric sclerae. MMM Resp:  CTA bilaterally, no wheezing or rales. No accessory muscle use CV:  Regular  rhythm,  No edema GI:  Soft, Non distended, Non tender.  +Bowel sounds Neurologic:  Alert and oriented X 0 normal speech, Psych:   Unable to assess Skin:  Petechiae on ankles and feet. No jaundice Reviewed most current lab test results and cultures  YES Reviewed most current radiology test results   YES Review and summation of old records today    NO Reviewed patient's current orders and MAR    YES 
PMH/ reviewed - no change compared to H&P 
________________________________________________________________________ Care Plan discussed with: 
  Comments Patient x Family RN x Care Manager Consultant Multidiciplinary team rounds were held today with , nursing, pharmacist and clinical coordinator. Patient's plan of care was discussed; medications were reviewed and discharge planning was addressed. ________________________________________________________________________ Total NON critical care TIME:  40  Minutes Total CRITICAL CARE TIME Spent:   Minutes non procedure based Comments >50% of visit spent in counseling and coordination of care x As above   
________________________________________________________________________ Woo Sims MD  
 
Procedures: see electronic medical records for all procedures/Xrays and details which were not copied into this note but were reviewed prior to creation of Plan. LABS: 
I reviewed today's most current labs and imaging studies. Pertinent labs include: 
Recent Labs  
  11/29/18 0521 11/28/18 0415 WBC 13.1* 13.3* HGB 10.0* 10.4* HCT 30.2* 31.6* PLT 64* 81* Recent Labs  
  11/29/18 0521 11/28/18 0415 11/27/18 
1757 * 150* 152* K 3.8 3.7 3.4*  
* 125* 126* CO2 17* 18* 19* GLU 85 97 98 BUN 21* 24* 25* CREA 1.04 1.21 1.23  
CA 8.2* 7.7* 7.3*  
MG 1.6  --   --   
 
 
Signed: Woo Sims MD

## 2018-11-29 NOTE — PROGRESS NOTES
Yael Mohan hospice Liaison note: 
 
Reviewed case with Dr. Consuelo Padron for possible inpatient admission. Patient was just made comfort care today, he is alert with altered mental status, non verbal, breathing is a little heavy on 5 liters of O2 but regular rate. He has not required any comfort medications and has just come off of IV antibiotics and fluids. We will reassess tomorrow for possible inpatient GIP or comfort admission. Thank you, 
 
Bridget Robertson RN [facility]

## 2018-11-29 NOTE — PROGRESS NOTES
Pharmacy Medication Reconciliation The patient  was unable to be interviewed regarding current PTA medication list, use and drug allergies. Verified medications with outside medication list because patient was not alert and unable to speak, no family member was available in the room. Patient is also a candidate for hospice and have been considered for comfort care. Allergy Update: n/a Recommendations/Findings: The following amendments were made to the patient's active medication list on file at [facility]:  
1) Additions: n/a 
 
2) Deletions: n/a 
 
3) Changes: n/a 
 
 
-Clarified PTA med list with outside medication list. PTA medication list was corrected to the following:  
 
Prior to Admission Medications Prescriptions Last Dose Informant Patient Reported? Taking?  
acetaminophen (TYLENOL) 325 mg tablet Unknown at Unknown time  Yes No  
Sig: Take 650 mg by mouth every six (6) hours as needed for Pain (pain). albuterol-ipratropium (DUO-NEB) 2.5 mg-0.5 mg/3 ml nebu Unknown at Unknown time  Yes No  
Sig: 3 mL by Nebulization route every six (6) hours as needed (wheeze). ascorbic acid, vitamin C, (VITAMIN C) 1,000 mg tablet Unknown at Unknown time  Yes No  
Sig: Take 1,000 mg by mouth daily. aspirin delayed-release 81 mg tablet Unknown at Unknown time  Yes No  
Sig: Take 81 mg by mouth daily. atorvastatin (LIPITOR) 20 mg tablet Unknown at Unknown time  Yes No  
Sig: Take 20 mg by mouth daily. cyanocobalamin (VITAMIN B-12) 1,000 mcg tablet Unknown at Unknown time  Yes No  
Sig: Take 1,000 mcg by mouth daily. levothyroxine (SYNTHROID) 112 mcg tablet Unknown at Unknown time  Yes No  
Sig: Take 112 mcg by mouth Daily (before breakfast). sertraline (ZOLOFT) 50 mg tablet Unknown at Unknown time  Yes No  
Sig: Take 50 mg by mouth daily. Facility-Administered Medications: None Thank you, [address] PharmD candidate, Class of 2019

## 2018-11-30 NOTE — HOSPICE
[facility] RN consultation visit note. Order for hospice noted. History and events of this hospitalization reviewed. This is follow up assessment Mr. Melissa Naranjo  lying on right side with eyes closed. Opened eyes to verbal stimulation and did not track. Skin warm and dry except hands are cool with edema. RR 14 VS this am 
Visit Vitals /60 (BP 1 Location: Right arm, BP Patient Position: At rest) Pulse 75 Temp 97.5 °F (36.4 °C) Resp 16 Ht 6' (1.829 m) Wt 64.9 kg (143 lb 1.3 oz) SpO2 99% BMI 19.40 kg/m² Patient with use of accessory muscles with breaths and with grimacing during movement of extremeties for assessment. Will discuss with unit nurse Chantell for patient to receive prn dose of Morphine as ordered. Lungs with coarse ronchi audible upper 2/3 bilaterally. No cough during visit. Abdomen with minimal bowel sounds. Pedal pulses present with feet warm. Mendoza for dark ramirez urine. Peripheral iv sites in place x 2. Color sallow. Discussed above with hospice medical director Dr. Hawk Stewart. Order received to not admit today and to assess daily for being appropriate for inpatient hospice. Thank you for asking us to be a part of the care for this gentleman and his family. Please call [phone] if questions or concerns Batool Tellez RN [facility]

## 2018-11-30 NOTE — PROGRESS NOTES
Oncology Nursing Communication Tool 7:17 AM 
11/30/2018 Bedside and Verbal shift change report given to Karime Núñez RN (incoming nurse) by Juan Manuel Morales (outgoing nurse) on Dannis Graft. Report included the following information SBAR, Kardex and MAR.

## 2018-11-30 NOTE — PROGRESS NOTES
Oncology Nursing Communication Tool 7:41 PM 
11/29/2018 Bedside shift change report given to Jeremiah Betts RN (incoming nurse) by Skinny Tony RN (outgoing nurse) on Love Pantoja. Report included the following information SBAR. Shift Summary: Pt transferred from gen surgical for comfort care, pt consistent and stable Issues for physician to address: Monitor Oncology Shift Note Admission Date 11/24/2018 Admission Diagnosis Altered mental status Code Status DNR Consults IP CONSULT TO HOSPITALIST 
IP CONSULT TO PALLIATIVE CARE - PROVIDER 
IP CONSULT TO CARDIOLOGY Cardiac Monitoring [] Yes [x] No  
  
Purposeful Hourly Rounding [x] Yes   
Brice Score Total Score: 3 Brice score 3 or > [x] Bed Alarm [] Avasys [] 1:1 sitter [] Patient refused (Place signed refusal form in chart) Pain Managed [x] Yes [] No  
 Key Pain Meds   
    
  
 acetaminophen (TYLENOL) 325 mg tablet Take 650 mg by mouth every six (6) hours as needed for Pain (pain). Influenza Vaccine Received Flu Vaccine for Current Season (usually Sept-March): Yes Oxygen needs? [] Room air Oxygen @  []1L    []2L    []3L   []4L    [x]5L   []6L Use home O2? [] Yes [] No 
Perform O2 challenge test using  smartphrase (.oxygenchallenge) Last bowel movement Last Bowel Movement Date: 11/28/18 
bowel movement Urinary Catheter Urinary Catheter 11/24/18 2- way; Mendoza-Indications for Use: Chronic urinary retention/bladder outlet obstruction Urinary Catheter 11/24/18 2- way; Mendoza-Urine Output (mL): 500 ml LDAs Peripheral IV 11/24/18 Left Arm (Active) Site Assessment Clean, dry, & intact 11/29/2018  5:04 PM  
Phlebitis Assessment 0 11/29/2018  5:04 PM  
Infiltration Assessment 0 11/29/2018  5:04 PM  
Dressing Status Clean, dry, & intact 11/29/2018  5:04 PM  
Dressing Type Transparent 11/29/2018  5:04 PM  
Hub Color/Line Status Flushed;Pink 11/29/2018  5:04 PM  
 Action Taken Open ports on tubing capped 11/25/2018  4:00 AM  
Alcohol Cap Used Yes 11/28/2018  8:21 PM  
   
Peripheral IV 11/24/18 Right Antecubital (Active) Site Assessment Clean, dry, & intact 11/29/2018  5:04 PM  
Phlebitis Assessment 0 11/29/2018  5:04 PM  
Infiltration Assessment 0 11/29/2018  5:04 PM  
Dressing Status Clean, dry, & intact 11/29/2018  5:04 PM  
Dressing Type Transparent 11/29/2018  5:04 PM  
Hub Color/Line Status Flushed;Green 11/29/2018  5:04 PM  
Action Taken Open ports on tubing capped 11/25/2018  4:00 AM  
Alcohol Cap Used Yes 11/28/2018  8:21 PM  
                  
  
Readmission Risk Assessment Tool Score High Risk   
      
 24 Total Score 3 Has Seen PCP in Last 6 Months (Yes=3, No=0) 3 Patient Length of Stay (>5 days = 3) 4 IP Visits Last 12 Months (1-3=4, 4=9, >4=11) 9 Pt. Coverage (Medicare=5 , Medicaid, or Self-Pay=4) 5 Charlson Comorbidity Score (Age + Comorbid Conditions) Criteria that do not apply:  
 . Living with Significant Other. Assisted Living. LTAC. SNF. or  
Rehab Expected Length of Stay 4d 21h Actual Length of Stay 5 Damion Doan RN

## 2018-11-30 NOTE — PROGRESS NOTES
Bedside shift change report given to Bert Gupta RN (oncoming nurse) by Moira Trammell RN (offgoing nurse). Report included the following information SBAR, Intake/Output, MAR and Accordion.

## 2018-11-30 NOTE — HOSPICE
[employer] RN consultation visit note. Order for hospice noted. History and events of this hospitalization reviewed. This a follow up note. Hospice medical director Dr. Belenda Siemens and I visited with patient. Visit Vitals /60 (BP 1 Location: Right arm, BP Patient Position: At rest) Pulse 75 Temp 97.5 °F (36.4 °C) Resp 16 Ht 6' (1.829 m) Wt 64.9 kg (143 lb 1.3 oz) SpO2 99% BMI 19.40 kg/m² Color poor with cyanosis of hands and feet, skin warm and moist. 
 
Order received from Dr. Fitz Cooley to admit patient to Routine LOC inpatient with dx of Sepsis. TC to first MPOA step daughter Pennie Gaxiola. Support given as patient is so ill. Advised of the above. Said she appreciated the efforts at patient's comfort and agrees to inpatient hospice care. Said discharge plan might be to return to the [facility] or to the [facility] as patient's wife lives in [place] and is not able to come visit. Plans are to email consents to [facility] for the medicare hospice benefit. Hospice MSW Bhavesh Bearden is to coordinate the above. 1900   Consents were not received at [facility] email address. Plans are to do consents and admit Saturday am.  [facility] expressed understanding and appreciation with this plan. Please call [phone] if questions or concerns Octavio Porter RN [facility]

## 2018-11-30 NOTE — PROGRESS NOTES
Hospitalist Progress Note NAME: Juan Taylor :  1935 MRN:  [id number] Assessment / Plan: 
Septic shock 2/2 UTI resolved AMS most likely toxic metabolic encephalopathy in setting sepsis POA Severe sepsis 2/2  Complicated UTI in setting of indwelling mike POA Acute respiratory failure with hypoxia poa  
hypothermia ELISABETH most likely prerenal - resolved Hypernatremia Hypokalemia - resolved Hypophosphatemia Pneumonia Complicated UTI Hypertension Non sustained VTAC   
 
 Patient on comfort care on prn ativan and morphine Palliative care following Hospice consulted  Patient now on comfort care hospice. Medications have been discontinued and is now on comfort measures Palliative following. Family meeting for yesterday 4 pm did not hold. Family did not come. Continue cefepime Patient had 7 beats of Vtac last night . Check K and Magnesium and correct , Goal K > 4 and Mag > 2. Check TSH ordered Consult Cardiology Wbc trending down 13.1 today Na trending down 147 today , continue Dextrose /K Infusion Clonidine patch for htn  
 
 
 
 
-CXR  shows worsening airspace dx R>L with mild pleural effusion - Leukocytosis wbc 13.3 today , trend - Continue cefepime  
- d/c Vancomycin 
- Urine cx + pseudomonas sensitive to cefepime - Hypernatremia - Na trending down 152 -> 150 today continue Dextrose infusion w/ K 20 meq - Hypokalemia resolved K 3.7 today - Palliative following and are planning to meet with family. Meeting schedule for 4pm tomorrow with Mathieu HE. - Hypertension . Clonidine patch 
- meeting with family today set for 4 pm. Patient probably Hospice appropriate  
- Resp failure on 15 L NRB , Recheck CXR portable ordered - Hyponatremia Na 152 trending down, switch from dextrose 1/2ns + K to Dextrose infusion + K 20 meq  continue  
- Daily bmp to monitor Na & K  
- Hypokalemia K 134 
- blood cx negative - continue cefepime and Vancomycin - Vitals wnl today - Palliative following and are planning to meet with family. Meeting schedule for 4pm tomorrow with Rika Ruddy YING. 11/26 
- rapid response today because of hypoxia 2/2 lot of secretions , sat came up with NRB mask  
- DG to Tele since yesterday  
- started levophed through central line 0n 11/24 
- off Pressors on 11/26 
-CT scan of the brain was negative, keep NPO while lethargic, speech eval   
- last urine culture in 2018 grew Klebsiella oxytoca and morganella Morgani sensitive to ceftriaxone. - new urine culture growing pseudomonas and 2nd one - Abx switched to cefepime and vanco  
Continue with as needed IV fluid boluses if systolic blood pressure less than 90 Continue with D5 half-normal saline with K,  keep n.p.o. while lethargic 
-Daily BMP 
 blood cultures negative Mendoza changed in the ED 
- We will get urine electrolytes,  
renal ultrasound showed : 
1. No hydronephrosis or other sonographic abnormality of the kidneys. 2. Infrarenal abdominal aortic aneurysm measuring 4.6 x 4.3 cm 
  
- K repleted and phos Hypertension 
hld Hypothyroidism Hold all BP meds Hold atorvastatin Restart levothyroxine once able to tolerate p.o. 
  
H/o cva Dementia Depression Hold sertraline  
  
Copd Stable , c/w prn duonebs No home inhalers  
  
 
DVT prophylaxis Heparin CODE STATUS  
DNR , called Nok on chart Rika Fox at [phone] ,[facility] called back , Pt is DNR Pt is critically ill , will get palliative involved Disposition TBD 
  
Code Status: DNR ( ACP on chart) DVT Prophylaxis: Hep SQ 
GI Prophylaxis: not indicated Body mass index is 19.4 kg/m². therefore classifying patient as normal weight Code status: DNR Prophylaxis: Hep SQ Recommended Disposition: SNF/LTC Subjective: Chief Complaint / Reason for Physician Visit \"FU severe sepsis\". Discussed with RN events overnight. Review of Systems: Symptom Y/N Comments  Symptom Y/N Comments Fever/Chills    Chest Pain Poor Appetite    Edema Cough    Abdominal Pain Sputum    Joint Pain SOB/WEBSTER    Pruritis/Rash Nausea/vomit    Tolerating PT/OT Diarrhea    Tolerating Diet Constipation    Other Could NOT obtain due to: Lethargy Objective: VITALS:  
Last 24hrs VS reviewed since prior progress note. Most recent are: 
Patient Vitals for the past 24 hrs: 
 Temp Pulse Resp BP SpO2  
11/29/18 1505 96.3 °F (35.7 °C) 96 16 92/57 (!) 56 % 11/29/18 1122     94 % 11/29/18 0947 97.8 °F (36.6 °C) 85 22 120/81 100 % Intake/Output Summary (Last 24 hours) at 11/30/2018 3035 Last data filed at 11/30/2018 0116 Gross per 24 hour Intake  Output 100 ml Net -100 ml PHYSICAL EXAM: 
General: WD, WN. Unresponsive EENT:  EOMI. Anicteric sclerae. MMM Resp:  CTA bilaterally, no wheezing or rales. No accessory muscle use CV:  Regular  rhythm,  No edema GI:  Soft, Non distended, Non tender.  +Bowel sounds Neurologic:  Alert and oriented X 0 normal speech, Psych:   Unable to assess Skin:  Petechiae on ankles and feet. No jaundice Reviewed most current lab test results and cultures  YES Reviewed most current radiology test results   YES Review and summation of old records today    NO Reviewed patient's current orders and MAR    YES 
PMH/ reviewed - no change compared to H&P 
________________________________________________________________________ Care Plan discussed with: 
  Comments Patient x Family RN x Care Manager Consultant Multidiciplinary team rounds were held today with , nursing, pharmacist and clinical coordinator. Patient's plan of care was discussed; medications were reviewed and discharge planning was addressed. ________________________________________________________________________ Total NON critical care TIME:  40  Minutes Total CRITICAL CARE TIME Spent:   Minutes non procedure based Comments >50% of visit spent in counseling and coordination of care x As above   
________________________________________________________________________ Carolina Shah MD  
 
Procedures: see electronic medical records for all procedures/Xrays and details which were not copied into this note but were reviewed prior to creation of Plan. LABS: 
I reviewed today's most current labs and imaging studies. Pertinent labs include: 
Recent Labs  
  11/29/18 0521 11/28/18 0415 WBC 13.1* 13.3* HGB 10.0* 10.4* HCT 30.2* 31.6* PLT 64* 81* Recent Labs  
  11/29/18 0521 11/28/18 0415 * 150*  
K 3.8 3.7 * 125* CO2 17* 18* GLU 85 97 BUN 21* 24* CREA 1.04 1.21  
CA 8.2* 7.7* MG 1.6  --   
 
 
Signed: Carolina Shah MD

## 2018-11-30 NOTE — PROGRESS NOTES
Brief Nutrition Note Chart reviewed. Pt on comfort care. No nutrition interventions at this time. Please consult if nutrition needs arise. Danielle Camargo RDN Pager: [phone]

## 2018-12-01 NOTE — HOSPICE
[facility] RN consultation visit note. Order for hospice noted. History and events of this hospitalization reviewed. This is follow up from events of last evening. Hospice MSW Mejia Nuñez is to reach out to daughter in law Ross HE  to have consents for inpatient hospice signed and then sent back to us. Dr. Myrna Carlson and hospice MD on call Dr. Rose Mary Orr advised. Please call [phone] if questions or concerns Bard Christa RN [facility]

## 2018-12-01 NOTE — PROGRESS NOTES
CM reviewed medical record, followed up re: transitional care plan. BS Hospice meeting w/ family re: anticipated admission to [address] [facility], CM spoke w/ RN confirmed plan for transfer to inpatient hospice. Yenifer Drake, [facility] [place] [facility] Jaquan Dickerson

## 2018-12-01 NOTE — PROGRESS NOTES
Oncology Nursing Communication Tool 
7:22 AM 
12/1/2018 Bedside shift change report given to Jack Sheriff RN (incoming nurse) by Kavon Guzman (outgoing nurse) on Masoud Browning. Report included the following information SBAR, Kardex, Intake/Output and MAR. Shift Summary: Pt on comfort care. Possible IP Hospice admit today. No PRN medications needed. Pt appeared comfortable and with no distress. Issues for physician to address: Oncology Shift Note Admission Date 11/24/2018 Admission Diagnosis Altered mental status Code Status DNR Consults IP CONSULT TO HOSPITALIST 
IP CONSULT TO PALLIATIVE CARE - PROVIDER 
IP CONSULT TO CARDIOLOGY Cardiac Monitoring [] Yes [x] No  
  
Purposeful Hourly Rounding [x] Yes   
Brice Score Total Score: 2 Brice score 3 or > [] Bed Alarm [] Avasys [] 1:1 sitter [] Patient refused (Place signed refusal form in chart) Pain Managed [x] Yes [] No  
 Key Pain Meds   
    
  
 acetaminophen (TYLENOL) 325 mg tablet Take 650 mg by mouth every six (6) hours as needed for Pain (pain). Influenza Vaccine Received Flu Vaccine for Current Season (usually Sept-March): Yes Oxygen needs? [] Room air Oxygen @  []1L    [x]2L    []3L   []4L    []5L   []6L Use home O2? [x] Yes [] No 
Perform O2 challenge test using  smartphrase (.oxygenchallenge) Last bowel movement Last Bowel Movement Date: 11/28/18 
bowel movement Urinary Catheter Urinary Catheter 11/24/18 2- way; Mendoza-Indications for Use: End of life care Urinary Catheter 11/24/18 2- way; Mendoza-Urine Output (mL): 40 ml LDAs Peripheral IV 11/24/18 Left Arm (Active) Site Assessment Clean, dry, & intact 12/1/2018  3:11 AM  
Phlebitis Assessment 0 12/1/2018  3:11 AM  
Infiltration Assessment 0 12/1/2018  3:11 AM  
Dressing Status Clean, dry, & intact 12/1/2018  3:11 AM  
Dressing Type Transparent;Tape 12/1/2018  3:11 AM  
 Hub Color/Line Status Pink 12/1/2018  3:11 AM  
Action Taken Open ports on tubing capped 11/25/2018  4:00 AM  
Alcohol Cap Used Yes 11/28/2018  8:21 PM  
   
Peripheral IV 11/24/18 Right Antecubital (Active) Site Assessment Clean, dry, & intact 12/1/2018  3:11 AM  
Phlebitis Assessment 0 12/1/2018  3:11 AM  
Infiltration Assessment 0 12/1/2018  3:11 AM  
Dressing Status Clean, dry, & intact 12/1/2018  3:11 AM  
Dressing Type Transparent;Tape 12/1/2018  3:11 AM  
Hub Color/Line Status Green 12/1/2018  3:11 AM  
Action Taken Open ports on tubing capped 11/25/2018  4:00 AM  
Alcohol Cap Used Yes 11/28/2018  8:21 PM  
                  
  
Readmission Risk Assessment Tool Score High Risk   
      
 24 Total Score 3 Has Seen PCP in Last 6 Months (Yes=3, No=0) 3 Patient Length of Stay (>5 days = 3) 4 IP Visits Last 12 Months (1-3=4, 4=9, >4=11) 9 Pt. Coverage (Medicare=5 , Medicaid, or Self-Pay=4) 5 Charlson Comorbidity Score (Age + Comorbid Conditions) Criteria that do not apply:  
 . Living with Significant Other. Assisted Living. LTAC. SNF. or  
Rehab Expected Length of Stay 4d 21h Actual Length of Stay 7 Katharina Lombardo

## 2018-12-01 NOTE — DISCHARGE SUMMARY
Hospitalist Discharge Summary Patient ID: Matthew Jaquez 
[id number] 
89 y.o. 
1935 PCP on record: Abbey Huffman MD 
 
Admit date: 11/24/2018 Discharge date and time: 12/1/2018 DISCHARGE DIAGNOSIS: 
 
Septic shock 2/2 UTI 
 
 
CONSULTATIONS: 
IP CONSULT TO HOSPITALIST 
IP CONSULT TO PALLIATIVE CARE - PROVIDER 
IP CONSULT TO CARDIOLOGY Excerpted HPI from H&P of Jcarlos Avila MD: Carmenza Quinonez is a 82 y.o.  male with known history as listed below presents to ED with complaint noted above. Available records were reviewed at the time of H&P. This is a 61-year-old male from nursing home with past medical history of chronic Emndoza, COPD, hypothyroidism, dementia who was sent to the ED with altered mental status.  Most of the history taken from the ED note as no family members was was at [facility], [employer] pt is \"not talking per his baseline. \" RN at nursing home also reports \"periods of apnea. Upon presentation to the ED, patient was found to be hypotensive and dehydrated, and hypothermic.  Review of his labs showed that he has hyponatremia, ELISABETH, and uti.   Lactic acid was within normal limits.  Patient is being treated with IVf, iv  ceftriaxone, has a bear hugger. When seen patient was unable to contribute any history.  Therefore patient admitted for severe sepsis secondary to complicated UTI. We were asked to see for work up and evaluation of the above problems.  
  
 
 
 
______________________________________________________________________ DISCHARGE SUMMARY/HOSPITAL COURSE:  for full details see H&P, daily progress notes, labs, consult notes. Patient deteriorated and eventually was made hospice/comfort care. He is being discharged to inpatient hospice. 
 
 
 
_______________________________________________________________________ Patient seen and examined by me on discharge day. Pertinent Findings: 
Gen:    Not in distress Chest: Clear lungs CVS:   Regular rhythm. No edema Abd:  Soft, not distended, not tender Neuro:  lethargic 
_______________________________________________________________________ DISCHARGE MEDICATIONS:  
Current Discharge Medication List  
  
START taking these medications Details LORazepam (ATIVAN) 2 mg/mL injection 0.25 mL by IntraVENous route every one (1) hour as needed. Max Daily Amount: 12 mg. 
Qty: 1 Vial, Refills: 0 Associated Diagnoses: Acute metabolic encephalopathy  
  
morphine 2 mg/mL injection 1 mL by IntraVENous route every fifteen (15) minutes as needed. Max Daily Amount: 192 mg. Qty: 1 mL, Refills: 0 Associated Diagnoses: Acute metabolic encephalopathy CONTINUE these medications which have NOT CHANGED Details  
albuterol-ipratropium (DUO-NEB) 2.5 mg-0.5 mg/3 ml nebu 3 mL by Nebulization route every six (6) hours as needed (wheeze). STOP taking these medications  
  
 acetaminophen (TYLENOL) 325 mg tablet Comments:  
Reason for Stopping:   
   
 levothyroxine (SYNTHROID) 112 mcg tablet Comments:  
Reason for Stopping:   
   
 aspirin delayed-release 81 mg tablet Comments:  
Reason for Stopping:   
   
 sertraline (ZOLOFT) 50 mg tablet Comments:  
Reason for Stopping:   
   
 cyanocobalamin (VITAMIN B-12) 1,000 mcg tablet Comments:  
Reason for Stopping:   
   
 ascorbic acid, vitamin C, (VITAMIN C) 1,000 mg tablet Comments:  
Reason for Stopping:   
   
 atorvastatin (LIPITOR) 20 mg tablet Comments:  
Reason for Stopping: My Recommended Diet, Activity, Wound Care, and follow-up labs are listed in the patient's Discharge Insturctions which I have personally completed and reviewed. ______________________________________________________________________ Risk of deterioration: High 
 
Condition at Discharge:  Stable 
______________________________________________________________________ Disposition IP Hospice ______________________________________________________________________ Care Plan discussed with:  
Patient, Family, RN, Care Manager, Consultant 
 
______________________________________________________________________ Code Status: DNR 
______________________________________________________________________ Follow up with: PCP : Abbey Huffman MD 
Follow-up Information Follow up With Specialties Details Why Contact Info Abbey Huffman MD    Patient can only remember the practice name and not the physician Total time in minutes spent coordinating this discharge (includes going over instructions, follow-up, prescriptions, and preparing report for sign off to her PCP) :  33 minutes Signed: 
Marquis Kaitlyn MD

## 2018-12-02 PROBLEM — A41.9 SEPSIS (HCC): Status: ACTIVE | Noted: 2018-01-01

## 2018-12-02 NOTE — PROGRESS NOTES
CM reviewed medical record, spoke w/ hospice RN who confirmed plan to admit to IP hospice. CM will continue to follow and assist w/ transitional care planning as indicated. Alvina Richardson, [facility] [phone]

## 2018-12-02 NOTE — PROGRESS NOTES
Tech notified RN of bleeding around rectum. Area was cleaned; barrier cream applied. Mepliex on sacrum changed.

## 2018-12-02 NOTE — PROGRESS NOTES
Oncology Nursing Communication Tool 7:25 AM 
12/2/2018 Bedside shift change report given to Archana Hopkins RN (incoming nurse) by Ruth Vasquez (outgoing nurse) on [facility]. Report included the following information SBAR, Kardex, Intake/Output and MAR. Shift Summary: Pt remained stable during shift. Pt on comfort care, waiting for Hospice consents to be signed. Pt remained comfortable during shift. Morphine x1 Issues for physician to address: Oncology Shift Note Admission Date 11/24/2018 Admission Diagnosis Altered mental status Code Status DNR Consults IP CONSULT TO HOSPITALIST 
IP CONSULT TO PALLIATIVE CARE - PROVIDER 
IP CONSULT TO CARDIOLOGY Cardiac Monitoring [] Yes [x] No  
  
Purposeful Hourly Rounding [x] Yes   
Brice Score Total Score: 1 Brice score 3 or > [] Bed Alarm [] Avasys [] 1:1 sitter [] Patient refused (Place signed refusal form in chart) Pain Managed [x] Yes [] No  
 Key Pain Meds   
    
  
 morphine 2 mg/mL injection  (Taking) 1 mL by IntraVENous route every fifteen (15) minutes as needed. Max Daily Amount: 192 mg.  
 acetaminophen (TYLENOL) 325 mg tablet Take 650 mg by mouth every six (6) hours as needed for Pain (pain). Influenza Vaccine Received Flu Vaccine for Current Season (usually Sept-March): Yes Oxygen needs? [] Room air Oxygen @  []1L    [x]2L    []3L   []4L    []5L   []6L Use home O2? [] Yes [] No 
Perform O2 challenge test using  smartphrase (.oxygenchallenge) Last bowel movement Last Bowel Movement Date: 11/28/18 
bowel movement Urinary Catheter Urinary Catheter 11/24/18 2- way; Mendoza-Indications for Use: End of life care Urinary Catheter 11/24/18 2- way; Mendoza-Urine Output (mL): 40 ml LDAs Peripheral IV 11/24/18 Left Arm (Active) Site Assessment Clean, dry, & intact 12/2/2018  2:30 AM  
Phlebitis Assessment 0 12/2/2018  2:30 AM  
 Infiltration Assessment 0 12/2/2018  2:30 AM  
Dressing Status Clean, dry, & intact 12/2/2018  2:30 AM  
Dressing Type Transparent;Tape 12/2/2018  2:30 AM  
Hub Color/Line Status Pink 12/2/2018  2:30 AM  
Action Taken Open ports on tubing capped 11/25/2018  4:00 AM  
Alcohol Cap Used Yes 11/28/2018  8:21 PM  
   
Peripheral IV 11/24/18 Right Antecubital (Active) Site Assessment Clean, dry, & intact 12/2/2018  2:30 AM  
Phlebitis Assessment 0 12/2/2018  2:30 AM  
Infiltration Assessment 0 12/2/2018  2:30 AM  
Dressing Status Clean, dry, & intact 12/2/2018  2:30 AM  
Dressing Type Trach dressing; Tape 12/2/2018  2:30 AM  
Hub Color/Line Status Green 12/2/2018  2:30 AM  
Action Taken Open ports on tubing capped 11/25/2018  4:00 AM  
Alcohol Cap Used Yes 11/28/2018  8:21 PM  
                  
  
Readmission Risk Assessment Tool Score High Risk   
      
 24 Total Score 3 Has Seen PCP in Last 6 Months (Yes=3, No=0) 3 Patient Length of Stay (>5 days = 3) 4 IP Visits Last 12 Months (1-3=4, 4=9, >4=11) 9 Pt. Coverage (Medicare=5 , Medicaid, or Self-Pay=4) 5 Charlson Comorbidity Score (Age + Comorbid Conditions) Criteria that do not apply:  
 . Living with Significant Other. Assisted Living. LTAC. SNF. or  
Rehab Expected Length of Stay 4d 21h Actual Length of Stay 8 [place]

## 2018-12-02 NOTE — PROGRESS NOTES
Oncology Nursing Communication Tool 3:25 PM 
12/2/2018 \"Bedside\" shift change report given to Isabel Petit RN (incoming nurse) by Rhianna Romero (outgoing nurse) on [facility]. Report included the following information Shift Summary: converted to hospice, bath, q4 hr scheduled medications Issues for physician to address: none Oncology Shift Note Admission Date 12/2/2018 Admission Diagnosis Sepsis ([facility] [A41.9] Code Status DNR Consults None Cardiac Monitoring [] Yes [] No  
  
Purposeful Hourly Rounding [] Yes   
Brice Score Brice score 3 or > [] Bed Alarm [] Avasys [] 1:1 sitter [] Patient refused (Place signed refusal form in chart) Pain Managed [] Yes [] No  
 Key Pain Meds   
    
  
 morphine 2 mg/mL injection 1 mL by IntraVENous route every fifteen (15) minutes as needed. Max Daily Amount: 192 mg. Influenza Vaccine Oxygen needs? [] Room air Oxygen @  []1L    []2L    []3L   []4L    []5L   []6L Use home O2? [] Yes [] No 
Perform O2 challenge test using  smartphrase (.oxygenchallenge) Last bowel movement   
bowel movement Urinary Catheter LDAs Readmission Risk Assessment Tool Score Low Risk 12 Total Score 3 Has Seen PCP in Last 6 Months (Yes=3, No=0)  
 4 IP Visits Last 12 Months (1-3=4, 4=9, >4=11) 5 Charlson Comorbidity Score (Age + Comorbid Conditions) Criteria that do not apply:  
 . Living with Significant Other. Assisted Living. LTAC. SNF. or  
Rehab Patient Length of Stay (>5 days = 3) Pt. Coverage (Medicare=5 , Medicaid, or Self-Pay=4) Expected Length of Stay - - - Actual Length of Stay 0 Rhianna Romero

## 2018-12-02 NOTE — DISCHARGE SUMMARY
Hospitalist Discharge Summary Patient ID: Emory Garner 
[id number] 
55 y.o. 
1935 PCP on record: Abbey Huffman MD 
 
Admit date: 11/24/2018 Discharge date and time: 12/2/2018 DISCHARGE DIAGNOSIS: 
 
Septic shock 2/2 uti CONSULTATIONS: 
IP CONSULT TO HOSPITALIST 
IP CONSULT TO PALLIATIVE CARE - PROVIDER 
IP CONSULT TO CARDIOLOGY Excerpted HPI from H&P of Paty Lim MD: Francis is a 82 y.o.   male with known history as listed below presents to ED with complaint noted above. Available records were reviewed at the time of H&P. This is a 82-year-old male from nursing home with past medical history of chronic Mendoza, COPD, hypothyroidism, dementia who was sent to the ED with altered mental status.  Most of the history taken from the ED note as no family members was was at [facility], Ludesi pt is \"not talking per his baseline. \" RN at nursing home also reports \"periods of apnea. Upon presentation to the ED, patient was found to be hypotensive and dehydrated, and hypothermic.  Review of his labs showed that he has hyponatremia, ELISABETH, and uti.   Lactic acid was within normal limits.  Patient is being treated with IVf, iv  ceftriaxone, has a bear hugger. When seen patient was unable to contribute any history.  Therefore patient admitted for severe sepsis secondary to complicated UTI. 
 
  
 
 
 
______________________________________________________________________ DISCHARGE SUMMARY/HOSPITAL COURSE:  for full details see H&P, daily progress notes, labs, consult notes. Patient deteriorated and eventually was made hospice/comfort care. He is being discharged to inpatient hospice. 
  
  
 
 
 
 
 
_______________________________________________________________________ Patient seen and examined by me on discharge day. Pertinent Findings: 
Gen:    Not in distress Chest:  Clear lungs CVS:    Regular rhythm. No edema Abd:     Soft, not distended, not tender Neuro:  lethargic 
 
 
_______________________________________________________________________ DISCHARGE MEDICATIONS:  
Current Discharge Medication List  
  
START taking these medications Details LORazepam (ATIVAN) 2 mg/mL injection 0.25 mL by IntraVENous route every one (1) hour as needed. Max Daily Amount: 12 mg. 
Qty: 1 Vial, Refills: 0 Associated Diagnoses: Acute metabolic encephalopathy  
  
morphine 2 mg/mL injection 1 mL by IntraVENous route every fifteen (15) minutes as needed. Max Daily Amount: 192 mg. Qty: 1 mL, Refills: 0 Associated Diagnoses: Acute metabolic encephalopathy CONTINUE these medications which have NOT CHANGED Details  
albuterol-ipratropium (DUO-NEB) 2.5 mg-0.5 mg/3 ml nebu 3 mL by Nebulization route every six (6) hours as needed (wheeze). STOP taking these medications  
  
 acetaminophen (TYLENOL) 325 mg tablet Comments:  
Reason for Stopping:   
   
 levothyroxine (SYNTHROID) 112 mcg tablet Comments:  
Reason for Stopping:   
   
 aspirin delayed-release 81 mg tablet Comments:  
Reason for Stopping:   
   
 sertraline (ZOLOFT) 50 mg tablet Comments:  
Reason for Stopping:   
   
 cyanocobalamin (VITAMIN B-12) 1,000 mcg tablet Comments:  
Reason for Stopping:   
   
 ascorbic acid, vitamin C, (VITAMIN C) 1,000 mg tablet Comments:  
Reason for Stopping:   
   
 atorvastatin (LIPITOR) 20 mg tablet Comments:  
Reason for Stopping: My Recommended Diet, Activity, Wound Care, and follow-up labs are listed in the patient's Discharge Insturctions which I have personally completed and reviewed. ______________________________________________________________________ Risk of deterioration: High 
 
Condition at Discharge:  Stable 
______________________________________________________________________ Disposition IP Hospice 
______________________________________________________________________ Care Plan discussed with:  
Patient, Family, RN, Care Manager, Consultant 
 
______________________________________________________________________ Code Status: DNR/DNI 
______________________________________________________________________ Follow up with: PCP : Abbey Huffman MD 
Follow-up Information Follow up With Specialties Details Why Contact Info Abbey Huffman MD    Patient can only remember the practice name and not the physician Total time in minutes spent coordinating this discharge (includes going over instructions, follow-up, prescriptions, and preparing report for sign off to her PCP) :  30 minutes Signed: 
Pamella Garcia MD

## 2018-12-02 NOTE — PROGRESS NOTES
Oncology Nursing Communication Tool 7:20 PM 
12/1/2018 Bedside shift change report given to Jimenez Silveira RN (incoming nurse) by Chet Kunz (outgoing nurse) on [facility]. Report included the following information SBAR, Kardex, Intake/Output, MAR and Recent Results. Shift Summary:  
  
 Issues for physician to address: Oncology Shift Note Admission Date 11/24/2018 Admission Diagnosis Altered mental status Code Status DNR Consults IP CONSULT TO HOSPITALIST 
IP CONSULT TO PALLIATIVE CARE - PROVIDER 
IP CONSULT TO CARDIOLOGY Cardiac Monitoring [] Yes [] No  
  
Purposeful Hourly Rounding [] Yes   
Brice Score Total Score: 2 Brice score 3 or > [] Bed Alarm [] Avasys [] 1:1 sitter [] Patient refused (Place signed refusal form in chart) Pain Managed [] Yes [] No  
 Key Pain Meds   
    
  
 morphine 2 mg/mL injection  (Taking) 1 mL by IntraVENous route every fifteen (15) minutes as needed. Max Daily Amount: 192 mg.  
 acetaminophen (TYLENOL) 325 mg tablet Take 650 mg by mouth every six (6) hours as needed for Pain (pain). Influenza Vaccine Received Flu Vaccine for Current Season (usually Sept-March): Yes Oxygen needs? [] Room air Oxygen @  []1L    []2L    []3L   []4L    []5L   []6L Use home O2? [] Yes [] No 
Perform O2 challenge test using  smartphrase (.oxygenchallenge) Last bowel movement Last Bowel Movement Date: 12/28/18 
bowel movement Urinary Catheter Urinary Catheter 11/24/18 2- way; Mendoza-Indications for Use: End of life care Urinary Catheter 11/24/18 2- way; Mendoza-Urine Output (mL): 40 ml LDAs Peripheral IV 11/24/18 Left Arm (Active) Site Assessment Clean, dry, & intact 12/1/2018  8:00 AM  
Phlebitis Assessment 0 12/1/2018  8:00 AM  
Infiltration Assessment 0 12/1/2018  8:00 AM  
Dressing Status Clean, dry, & intact 12/1/2018  8:00 AM  
Dressing Type Tape;Transparent 12/1/2018  8:00 AM  
 Hub Color/Line Status Pink;Patent 12/1/2018  8:00 AM  
Action Taken Open ports on tubing capped 11/25/2018  4:00 AM  
Alcohol Cap Used Yes 11/28/2018  8:21 PM  
   
Peripheral IV 11/24/18 Right Antecubital (Active) Site Assessment Clean, dry, & intact 12/1/2018  8:00 AM  
Phlebitis Assessment 0 12/1/2018  8:00 AM  
Infiltration Assessment 0 12/1/2018  8:00 AM  
Dressing Status Clean, dry, & intact 12/1/2018  8:00 AM  
Dressing Type Tape;Transparent 12/1/2018  8:00 AM  
Hub Color/Line Status Green;Patent 12/1/2018  8:00 AM  
Action Taken Open ports on tubing capped 11/25/2018  4:00 AM  
Alcohol Cap Used Yes 11/28/2018  8:21 PM  
                  
  
Readmission Risk Assessment Tool Score High Risk   
      
 24 Total Score 3 Has Seen PCP in Last 6 Months (Yes=3, No=0) 3 Patient Length of Stay (>5 days = 3) 4 IP Visits Last 12 Months (1-3=4, 4=9, >4=11) 9 Pt. Coverage (Medicare=5 , Medicaid, or Self-Pay=4) 5 Charlson Comorbidity Score (Age + Comorbid Conditions) Criteria that do not apply:  
 . Living with Significant Other. Assisted Living. LTAC. SNF. or  
Rehab Expected Length of Stay 4d 21h Actual Length of Stay 7 [facility]

## 2018-12-02 NOTE — HOSPICE
[facility] RN consultation visit note. Order for hospice noted. History and events of this hospitalization reviewed. This is follow up Consents were sent to step daughter Latanya Grijalva early in the day via scan to her email address. Did not hear from her later in the day. Called her this abdelrahman and [employer] reported she had not been home much today and that she will complete them and send them to us. Support given Plans are to admit to inpatient hospice in the am. 
 
Please call [phone] if questions or concerns Aga Cunningham RN [facility]

## 2018-12-02 NOTE — H&P
[facility] Good Help to Those in Need 
[phone] Patient Name: Maribell Dumont YOB: 1935 Date of Provider Hospice Visit: 12/02/18 Level of Care:   [x] General Inpatient (GIP)    [] Routine   [] Respite Current Location of Care: 
[] [facility] [] [facility] [x] [facility] [] [facility] [] Hospice [facility], patient referred from: 
[] [facility] [] [facility] [] [facility] [] [facility] [] Home [] Other:  
 
Date of [address] [facility] Admission: 12-2-18 Hospice Medical Director at time of admission: Dr. Rosalba Cifuentes Principle Hospice Diagnosis: sepsis Diagnoses RELATED to the terminal prognosis: respiratory failure,Pseudomonas  UTI, acute renal failure, encephalopathy, COPD , Pneumonia Other Diagnoses: dementia, hx CVA, debility 
  
 HOSPICE SUMMARY Do not cut and paste chart information other than imaging findings 
  
Maribell Dumont is a 82y.o. year old who was admitted to [facility]. Pt hx of cva, copd, chronic mike with recent hospitalization for UTI 10/18 was admitted to hospital on 11-24-18 with septic shocl d/t UTI. Required iv pressors, iv antibiotics for pseudomonas UTI. Rapid response call on 11-26-18 fro hypoxia requiring suctioning and NRB mask. CXR--b/l ASDZ. Patient has become minimally responsive and is now comfort care. Family contacted in agreement with hospice admission. admitted GIP today due to respiratory distress despite doses of iv morphine The patient's principle diagnosis has resulted in hypotension, hypoxia, b/l pneumonia, encephalopathy Refer to LCD  
  
Functionally, the patient's Karnofsky and/or Palliative Performance Scale has declined over a period of days and is estimated at 10 The patient is dependent on the following ADLs:all 
  
Objective information that support this patients limited prognosis includes: urine culture 11-24-18 Component Value Ref Range & Units Status Special Requests: NO SPECIAL REQUESTS Reflexed from [id number]    Final  
[place] Count >100,000 COLONIES/mL    Final  
Culture result: (Abnormal)     Final  
PSEUDOMONAS AERUGINOSA (PREDOMINATING) Abnormal   
  
  
  
CXR 11-27-18 IMPRESSION IMPRESSION: 
1. Slight worsening of bilateral airspace disease right greater than left with 
small pleural effusions. 02 sat 87% The patient/family chose comfort measures with the support of Hospice. 
  
 HOSPICE DIAGNOSES Active Symptoms: 1. Dyspnea 
  
  
 PLAN  
  
  
1. Admit to hospice [facility] LOC d/w respiratory disress 2. Iv morphine 2mg q4hrs and prn prn dyspnea/tachypnea 3. Iv ativan 1mg q4 hrs and prn restlessness 4. 02 NC and mike for comfort 5.  and SW to support family needs 6. Disposition: to LTC facility if stable but will likely pass in the hospital 
  
Prognosis estimated based on 11/30/18 clinical assessment is:  
[x] Hours to Days   
[] Days to Weeks   
[] Other: 
  
Communicated plan of care with: Hospice Case Manager; Hospice IDT; Care Team 
  
 GOALS OF CARE  
  
Resuscitation Status: DNR Durable DNR: [] Yes     [] No 
  
Primary Decision Maker (Health Care Agent): Shaggy Carter Relationship to patient: step daughter Phone number: 
[] Named in a scanned document  
[] Legal Next of Kin 
[] Guardian 
  
Secondary Decision Maker ([address]):  
Relationship to patient: 
Phone number: 
[] Named in a scanned document  
[] Legal Next of Kin 
[] Guardian 
  
ACP documents you current have include: 
[] Advance Directive or Living Will 
[] Durable Do Not Resuscitate 
[] Physician Orders for Scope of Treatment (POST) [] Medical Power of  
[] Other 
  
HISTORY  
  
History obtained from: chart and hospice RN 
  
CHIEF COMPLAINT:  
The patient is:  
[] Verbal 
[x] Nonverbal 
[x] Unresponsive 
  
HPI/SUBJECTIVE:   
  
2 dose of IV morphine today for pain or labored breathing 
  
 REVIEW OF SYSTEMS  
  
The following systems were: [] reviewed  [x] unable to be reviewed 
  
Positive ROS include: Constitutional: fatigue, weakness, in pain, short of breath Ears/nose/mouth/throat: increased airway secretions Respiratory:shortness of breath, wheezing Gastrointestinal:poor appetite, nausea, vomiting, abdominal pain, constipation, diarrhea Musculoskeletal:pain, deformities, swelling legs Neurologic:confusion, hallucinations, weakness Psychiatric:anxiety, feeling depressed, poor sleep Endocrine:  
  
Adult Non-Verbal Pain Assessment Score: 2  
Face [x] 0   No particular expression or smile 
[] 1   Occasional grimace, tearing, frowning, wrinkled forehead 
[] 2   Frequent grimace, tearing, frowning, wrinkled forehead 
  
Activity (movement) [x] 0   Lying quietly, normal position 
[] 1   Seeking attention through movement or slow, cautious movement 
[] 2   Restless, excessive activity and/or withdrawal reflexes 
  
Guarding 
[x] 0   Lying quietly, no positioning of hands over areas of body 
[] 1   Splinting areas of the body, tense 
[] 2   Rigid, stiff 
  
Physiology (vital signs) 
[] 0   Stable vital signs [] 1   Change in any of the following: SBP > 20mm Hg; HR > 20/minute 
[] 2   Change in any of the following: SBP > 30mm Hg; HR > 25/minute 
  
Respiratory 
[] 0   Baseline RR/SpO2, compliant with ventilator 
[] 1   RR > 10 above baseline, or 5% drop SpO2, mild asynchrony with ventilator 
[x] 2   RR > 20 above baseline, or 10% drop SpO2, asynchrony with ventilator 
  
 FUNCTIONAL ASSESSMENT  
  
Palliative Performance Scale (PPS): 10 
  
 
 
 
 PSYCHOSOCIAL/SPIRITUAL ASSESSMENT Active Problems: 
  [facility] (12[facility]/2018) Past Medical History:  
Diagnosis Date  [facility] ([facility]  Chronic obstructive pulmonary disease ([facility]  Dementia  Hypertension No past surgical history on file. Social History Tobacco Use  Smoking status: Former Smoker  Smokeless tobacco: Never Used Substance Use Topics  Alcohol use: No  
  Frequency: Never No family history on file. No Known Allergies Current Facility-Administered Medications Medication Dose Route Frequency  LORazepam (ATIVAN) injection 1 mg  1 mg IntraVENous Q4H  
 LORazepam (ATIVAN) injection 1 mg  1 mg IntraVENous Q1H PRN  
 morphine injection 2 mg  2 mg IntraVENous Q4H  
 morphine injection 2 mg  2 mg IntraVENous Q1H PRN  
 acetaminophen (TYLENOL) suppository 650 mg  650 mg Rectal Q4H PRN  
 scopolamine (TRANSDERM-SCOP) 1 mg over 3 days 1 Patch  1 Patch TransDERmal Q72H  
 glycopyrrolate (ROBINUL) injection 0.2 mg  0.2 mg IntraVENous Q4H  
 bisacodyl (DULCOLAX) suppository 10 mg  10 mg Rectal DAILY PRN  
 artificial saliva (MOUTH KOTE) 1 Spray  1 Spray Oral Q2H  
 saline peripheral flush soln 5 mL  5 mL InterCATHeter PRN PHYSICAL EXAM  
 
Wt Readings from Last 3 Encounters:  
11/25/18 143 lb 1.3 oz (64.9 kg) 10/25/18 143 lb 15.4 oz (65.3 kg) There were no vitals taken for this visit. Supplemental O2  [x] Yes  [] NO Last bowel movement:  
 
Currently this patient has: 
[x] Peripheral IV [] PICC  [] PORT [] ICD [x] Mendoza Catheter [] NG Tube   [] PEG Tube   
[] Rectal Tube [] Drain 
[] Other:  
 
Constitutional:unresponsive to verbal and tactile stimuli Eyes: pupils are dilated but responsive ENMT: paul brendan Cardiovascular: rrr Respiratory: labored slow respiration but using accessory muscles or respiration Gastrointestinal: soft Musculoskeletal:no movenent 2-3 plus UE and LE edema Skin:intact Neurologic:no movements of extremities Psychiatric:  
Other:  
 
 
Pertinent Lab and or Imaging Tests: 
Lab Results Component Value Date/Time  Sodium 147 (H) 11/29/2018 05:21 AM  
 Potassium 3.8 11/29/2018 05:21 AM  
 Chloride 122 (H) 11/29/2018 05:21 AM  
 CO2 17 (L) 11/29/2018 05:21 AM  
 Anion gap 8 11/29/2018 05:21 AM  
 Glucose 85 11/29/2018 05:21 AM  
 BUN 21 (H) 11/29/2018 05:21 AM  
 Creatinine 1.04 11/29/2018 05:21 AM  
 BUN/Creatinine ratio 20 11/29/2018 05:21 AM  
 GFR est AA >60 11/29/2018 05:21 AM  
 GFR est non-AA >60 11/29/2018 05:21 AM  
 Calcium 8.2 (L) 11/29/2018 05:21 AM  
 
Lab Results Component Value Date/Time Protein, total 6.6 11/24/2018 12:34 AM  
 Albumin 1.7 (L) 11/24/2018 12:34 AM  
 
   
 
Total time: 60 min Counseling / coordination time:d/w hospice RN, family were no present today  
> 50% counseling / coordination?: no

## 2018-12-02 NOTE — HOSPICE
[facility] 
[phone] Inpatient Nursing Admission Patient Name: Papi Berumen YOB: 1935 Age: 82 y.o. Date of Hospice Admission: 12/2/2018 Hospice Attending Elected by Patient: Beatris Duverney, MD 
Primary Care Physician: Armida, MD Abbey 
Admitting RN: Iván Leung : not present Level of Care (GIP/Routine/Respite): GIP Facility of Care: [address] [place] [address] Patient Room: [address] [place] [address] HOSPICE SUMMARY  
ER Visits/ Hospitalizations in past year:  10/10  Post fall   No injuries     10/18   UTI     10/24  This admit Hospice Diagnosis: Sepsis ([facility] [address] [A41.9] Onset Date of Hospice Diagnosis: Admitted 10/24 Co-Morbidities:  
Patient Active Problem List  
Diagnosis Code  PNA (pneumonia) J18.9  Physical debility R53.81  Dementia with behavioral disturbance F03.91  
 Oral phase dysphagia R13.11  
 Counseling regarding advanced care planning and goals of care Z71.89  Altered mental status R41.82  Sepsis ([facility] [address] A41.9 Diagnoses RELATED to the terminal prognosis: Septic shock related to UTI Other Diagnoses: DM Copied from parts of  Dr. Nichole Goodrich discharge summary \"This is a 77-year-old male from nursing home with past medical history of chronic Mendoza, COPD, hypothyroidism, dementia who was sent to the ED with altered mental status.  Most of the history taken from the ED note as no family members was was at [email or web address] [employer] pt is \"not talking per his baseline. \" RN at nursing home also reports \"periods of apnea. Upon presentation to the ED, patient was found to be hypotensive and dehydrated, and hypothermic.  Review of his labs showed that he has hyponatremia, ELISABETH, and uti.   Lactic acid was within normal limits.  Was treated with IVf, iv  ceftriaxone, has a bear hugger. \" Admitted for severe sepsis secondary to complicated UTI. Was treated without improvement.   Hospice recommended by MDs and accepted by family Melly Liu is a 82 y.o. who was admitted to [facility]. The patient's principle diagnosis of Sepsis  has resulted in somnolence, decreasing O2 saturations, and assessed dyspnea     Functionally, the patient's Palliative Performance Scale has declined over a period of two weeks  and is estimated at 20 Lovetta Blaze Objective information that support this patients limited prognosis includes: LABS AS BELOW Lovetta Blaze The patient/family chose comfort measures with the support of Hospice. Patient meets criteria for GIP LOC for the following reasons Patient meets for [facility] as evidenced by Potential for rapid decline Assessment Patient lying in bed unresponsive yet closed lips during oral care. [facility] was making [facility] contact earlier. 
  
RR 24 and patient pulling for air. This in spite of O2 at 2 lpm nc and Morphine 2 mg IV at 0500 and 0900 and Lorazepam 0.5 mg IV at 0900. 
  
Color sallow    Anasarca   Lungs with coarse sounds upper airways without cough effort. 
  
Consents for hospice [facility] 
  
Order received from [address] to admit to inpatient hospice with GIP LOC for uncontrolled symptoms in spite of medications and O2 and the potential for rapid decline   
  
 
 
ASSESSMENT Patient self-reports:     [x] No 
 
SYMPTOMS: Unit nurse reported patient with signs of dyspnea even after Morphine given earlier. Symptoms reported were \"pulling hard for air\" and looked uncomfortable SIGNS/PHYSICAL FINDINGS: Patient lying on back with mouth open  Skin warm and dry, anasarca present, scattered healing scabs and ecchymosis, color sallow with nailbeds with cyanosis, abdomen is soft with minimal bowel sounds, mike for dark ramirez urine, not able to feel pedal pulses yet feet warm, peripheral IV site right forearm unremarkable.  
RR 24 and pulling for air with use of accessory muscles with each breath, coarse sounds upper airways without cough effort KARNOFSKY: 20 
 
FAST for all dementia:  7F 
 
 
CLINICAL INFORMATION Wt Readings from Last 3 Encounters:  
18 64.9 kg (143 lb 1.3 oz) 10/25/18 65.3 kg (143 lb 15.4 oz) LAB VALUES Lab Results Component Value Date/Time Protein, total 6.6 2018 12:34 AM  
 Albumin 1.7 (L) 2018 12:34 AM  
 
 
Currently this patient has:  Peripheral iv    O2      Mendoza PLAN  
1. Uncontrolled pulmonary congestions   Scopolamine patch  Glycopyrrolate 0.2 mg IV q 4 hours scheduled 2. Uncontrolled respiratory distress   O2 2 lpm nc    Morphine 2 mg IV q 4 hours and q 1 hour as needed   Lorazepam 1 mg IV q 4 hours and q 1 hr prn as needed as adjunct therapy 3. Mouth breathing   Mouthkote  1 spray topical q 2 hours Hospice Team Frequency Orders:Skilled Nurse -  Daily x 7 days  with 5 PRN visits for symptom control. JAMES  1 visit for initial assessment/evaluation for family support and need for volunteer services. Lana Prado  1 visit for initial assessment/evaluation for spiritual support. ADVANCE CARE PLANNING (Complete in ACP Flow Sheet) Code Status: DNR Durable DNR: [x]  Yes Code Status Discussed/Confirmed:YES Preference for Other Life Sustaining Treatment Discussed/Confirmed:YES Hospitalization Preference:   family prefers for patient to remain inpatient Advance Care Planning 2018 Patient's Healthcare Decision Maker is: - Confirm Advance Directive None MPOA Daughter in law Corinne Hernadez   [phone]  Service: [] Yes  []  No      [x] Unknown Appropriate for Pinning Ceremony:  [] Yes     [] No 
Mu-ism: UNKNOWN  Home:  
 
DISCHARGE PLANNING 1. Discharge Plan: It is expected that patient will pass while inpatient yet to return to the [facility] or [facility] are options 2. Patient/Family teaching:  Education provided on symptoms at end of life 3. Response to patient/family teaching: understanding and appreciation expressed SOCIAL/EMOTIONAL/SPIRITUAL NEEDS Spiritual Issues Identified: none Psych/ Social/ Emotional Issues Identified: Wife lives in [facility] and is not able to visit  Daughter in [facility] lives out of town Caregiver Support: 
[x] Provided information on End of Life Care CARE COORDINATION Dr. Guzman Confer discharge to hospice order received Dr. Velez Son for order for hospice and provided verbal certification of terminal illness with life expectancy of 6 months or less. Orders for hospice admission, medications and plan of treatment received. Medication reconciliation completed. Dr. Ayesha Castaneda is hospice attending MD 
MEDS: See medication list below DME: Per hospital 
Supplies: Per hospital 
IDT communication to include MD, SN, SW, CH and support team 
 
ALLERGIES AND MEDICATIONS Allergies: No Known Allergies Current Facility-Administered Medications Medication Dose Route Frequency  LORazepam (ATIVAN) injection 1 mg  1 mg IntraVENous Q4H  
 LORazepam (ATIVAN) injection 1 mg  1 mg IntraVENous Q1H PRN  
 morphine injection 2 mg  2 mg IntraVENous Q4H  
 morphine injection 2 mg  2 mg IntraVENous Q1H PRN  
 acetaminophen (TYLENOL) suppository 650 mg  650 mg Rectal Q4H PRN  
 scopolamine (TRANSDERM-SCOP) 1 mg over 3 days 1 Patch  1 Patch TransDERmal Q72H  
 glycopyrrolate (ROBINUL) injection 0.2 mg  0.2 mg IntraVENous Q4H  
 bisacodyl (DULCOLAX) suppository 10 mg  10 mg Rectal DAILY PRN  
 artificial saliva (MOUTH KOTE) 1 Spray  1 Spray Oral Q2H  
 saline peripheral flush soln 5 mL  5 mL InterCATHeter PRN

## 2018-12-02 NOTE — HOSPICE
[facility] RN consultation visit note. Order for hospice noted. History and events of this hospitalization reviewed. This is follow up Patient lying in bed unresponsive yet closed lips during oral care. Unit nurse 1 Malgorzata Cummins patient was making eye contact earlier. RR 24 and patient pulling for air. This in spite of O2 at 2 lpm nc and Morphine 2 mg IV at 0500 and 0900 and Lorazepam 0.5 mg IV at 0900. Color sallow    Anasarca   Lungs with coarse sounds upper airways without cough effort. Cons[facility] for hospice [facility] . Order received from Dr. Steve Navarro to admit atient to inpatient hospice with GIP LOC for uncontrolled symptoms in spite of medications and O2 and the potential for rapid decline Dr. Hazel Saunders advised and deferred to Dr. Don Trujillo to be hospice attending. Thank you for asking us to be a part of the care for this gentleman and his family. Please call [phone] if questions or concerns Daysi Muniz RN [facility] Omar Mark

## 2018-12-03 NOTE — PROGRESS NOTES
0405: Pt passed. Nursing supervisor, Allyson Jimenez called. Allyson Jimenez said she would notify attending to pronounce. [place] called. Hospice/Dr. Jordana Meyers notified. [facility] called at [facility]. Message left with [facility] giving all pertinent information. Dr. Yousif Leonardo arrived at 0500 to pronounce. Pt transported to the [facility] by Rere Hong and Travis Martinez.

## 2018-12-03 NOTE — PROGRESS NOTES
contacted, patient passed away in Oncology Unit room 1119. No family present; family contacted by nursing staff. Will continue to follow up as needed and upon request as able. Visited by Rev. Isael Fiore, [address] [facility]  paging service: [phone])

## 2018-12-03 NOTE — PROGRESS NOTES
Called to pronounce patient. No response to noxious stimuli. No spontaneous breath sounds nor cardiac sounds. Pupils fixed and dilated. TOD: 4:05am. 
  
D/C Summary to be dictated by Attending MD. 
 
________________________________________________________________________ Elizabeth Dong MD

## 2018-12-03 NOTE — HOSPICE
[facility] Good Help to Those in Need 
[phone] Discharge/Death Nursing Note Patient Name: Donald Elena YOB: 1935 Age: 82 y.o. Date of Death: 18 Admitted Date: 2018 Time of Death: 4:05am 
 
[facility] Level of Care: GIP Patient Room: [address] [place] [address] Hospice Attending: No att. providers found Hospice Diagnosis: Sepsis ([facility] [address] [A41.9] Death Pronouncement Pronouncement of death completed by: Dr Terese Adam Agency staff was not  present at the time of death At the time of death the patient was documented as without signs of life The pt  within [address] The following were notified of the patient's death: hospice team, family, hospital staff Medications were disposed of per facility protocol Discharge Summary Discharge Reason: Death Summary of Care Provided: 
 
[x] Post mortem care provided by staff RN [x] Notification of  home by nursing supervisor 
[x] Referrals/Community resources provided:  
[x] Goals completed 
[] Durable Medical Equipment vendor notified Disciplines involved: [x] RN [x] SW [x]  [] RODRIGUEZ [] Vol [] PT [] OT [] ST [] BC 
 
[] IDT communication/notification Attending Physician, Dr. Bonnie Maldonado notified of death Bereaved Verify bereaved identified with name, address, telephone number and risk level Advance Care Planning 2018 Patient's Healthcare Decision Maker is: - Confirm Advance Directive None

## 2018-12-06 ENCOUNTER — HOME CARE VISIT (OUTPATIENT)
Dept: HOSPICE | Facility: HOSPICE | Age: 83
End: 2018-12-06
Payer: MEDICARE

## 2024-01-31 NOTE — DISCHARGE SUMMARY
Hospice Discharge Summary [facility] Good Help to Those in Need Date of Admission: 12/2/2018 Date of Discharge: 12/3/2018 Jarod Lozoya is a 82y.o. year old who was admitted to [facility] at [facility] with a Hospice diagnosis of Sepsis ([facility] [address] [A41.9]. The patient's care was focused on comfort and the patient passed away on 12/3/2018. Add in Hospice Summary through Plan from most recent Progress Note Deboraha Dandy a 82y.o. year old who was admitted to [facility]. Pt hx of cva, copd, chronic mike with recent hospitalization for UTI 10/18 was admitted to hospital on 11-24-18 with septic shocl d/t UTI. Required iv pressors, iv antibiotics for pseudomonas UTI. Rapid response call on 11-26-18 fro hypoxia requiring suctioning and NRB mask. CXR--b/l ASDZ. Patient has become minimally responsive and is now comfort care. Family contacted in agreement with hospice admission. admitted GIP today due to respiratory distress despite doses of iv morphine The patient's principle diagnosis has resulted in hypotension, hypoxia, b/l pneumonia, encephalopathy  
 
 
 

patient